# Patient Record
Sex: FEMALE | Race: WHITE | NOT HISPANIC OR LATINO
[De-identification: names, ages, dates, MRNs, and addresses within clinical notes are randomized per-mention and may not be internally consistent; named-entity substitution may affect disease eponyms.]

---

## 2018-02-28 ENCOUNTER — APPOINTMENT (OUTPATIENT)
Dept: NUCLEAR MEDICINE | Facility: IMAGING CENTER | Age: 73
End: 2018-02-28
Payer: MEDICARE

## 2018-02-28 ENCOUNTER — OUTPATIENT (OUTPATIENT)
Dept: OUTPATIENT SERVICES | Facility: HOSPITAL | Age: 73
LOS: 1 days | End: 2018-02-28
Payer: MEDICARE

## 2018-02-28 DIAGNOSIS — Z00.8 ENCOUNTER FOR OTHER GENERAL EXAMINATION: ICD-10-CM

## 2018-02-28 PROCEDURE — A9552: CPT

## 2018-02-28 PROCEDURE — 78815 PET IMAGE W/CT SKULL-THIGH: CPT | Mod: 26,PI

## 2018-02-28 PROCEDURE — 78815 PET IMAGE W/CT SKULL-THIGH: CPT

## 2018-03-26 ENCOUNTER — APPOINTMENT (OUTPATIENT)
Dept: THORACIC SURGERY | Facility: CLINIC | Age: 73
End: 2018-03-26
Payer: MEDICARE

## 2018-03-26 VITALS
RESPIRATION RATE: 17 BRPM | BODY MASS INDEX: 27.32 KG/M2 | HEIGHT: 66 IN | SYSTOLIC BLOOD PRESSURE: 118 MMHG | HEART RATE: 58 BPM | DIASTOLIC BLOOD PRESSURE: 82 MMHG | WEIGHT: 170 LBS | OXYGEN SATURATION: 98 %

## 2018-03-26 DIAGNOSIS — R91.1 SOLITARY PULMONARY NODULE: ICD-10-CM

## 2018-03-26 DIAGNOSIS — Z86.59 PERSONAL HISTORY OF OTHER MENTAL AND BEHAVIORAL DISORDERS: ICD-10-CM

## 2018-03-26 DIAGNOSIS — Z87.891 PERSONAL HISTORY OF NICOTINE DEPENDENCE: ICD-10-CM

## 2018-03-26 DIAGNOSIS — F41.9 ANXIETY DISORDER, UNSPECIFIED: ICD-10-CM

## 2018-03-26 DIAGNOSIS — Z86.79 PERSONAL HISTORY OF OTHER DISEASES OF THE CIRCULATORY SYSTEM: ICD-10-CM

## 2018-03-26 PROCEDURE — 99205 OFFICE O/P NEW HI 60 MIN: CPT

## 2018-03-26 RX ORDER — OXYCODONE HYDROCHLORIDE AND ACETAMINOPHEN 5; 325 MG/1; MG/1
5-325 TABLET ORAL
Refills: 0 | Status: ACTIVE | COMMUNITY

## 2018-03-26 RX ORDER — VENLAFAXINE HCL 75 MG
75 TABLET ORAL DAILY
Refills: 0 | Status: ACTIVE | COMMUNITY

## 2018-03-26 RX ORDER — RIVAROXABAN 20 MG/1
20 TABLET, FILM COATED ORAL
Refills: 0 | Status: ACTIVE | COMMUNITY

## 2018-04-03 ENCOUNTER — OUTPATIENT (OUTPATIENT)
Dept: OUTPATIENT SERVICES | Facility: HOSPITAL | Age: 73
LOS: 1 days | End: 2018-04-03

## 2018-04-03 ENCOUNTER — INPATIENT (INPATIENT)
Facility: HOSPITAL | Age: 73
LOS: 9 days | Discharge: HOME | End: 2018-04-13
Attending: THORACIC SURGERY (CARDIOTHORACIC VASCULAR SURGERY) | Admitting: GENERAL PRACTICE
Payer: MEDICARE

## 2018-04-03 VITALS
RESPIRATION RATE: 18 BRPM | HEART RATE: 170 BPM | OXYGEN SATURATION: 97 % | TEMPERATURE: 98 F | SYSTOLIC BLOOD PRESSURE: 127 MMHG | DIASTOLIC BLOOD PRESSURE: 81 MMHG

## 2018-04-03 VITALS
DIASTOLIC BLOOD PRESSURE: 96 MMHG | HEIGHT: 64 IN | RESPIRATION RATE: 16 BRPM | WEIGHT: 167.99 LBS | HEART RATE: 167 BPM | SYSTOLIC BLOOD PRESSURE: 132 MMHG | TEMPERATURE: 98 F

## 2018-04-03 DIAGNOSIS — R91.1 SOLITARY PULMONARY NODULE: ICD-10-CM

## 2018-04-03 DIAGNOSIS — Z98.890 OTHER SPECIFIED POSTPROCEDURAL STATES: Chronic | ICD-10-CM

## 2018-04-03 DIAGNOSIS — R00.0 TACHYCARDIA, UNSPECIFIED: ICD-10-CM

## 2018-04-03 DIAGNOSIS — F32.9 MAJOR DEPRESSIVE DISORDER, SINGLE EPISODE, UNSPECIFIED: ICD-10-CM

## 2018-04-03 DIAGNOSIS — I48.91 UNSPECIFIED ATRIAL FIBRILLATION: ICD-10-CM

## 2018-04-03 DIAGNOSIS — Z29.9 ENCOUNTER FOR PROPHYLACTIC MEASURES, UNSPECIFIED: ICD-10-CM

## 2018-04-03 DIAGNOSIS — F10.239 ALCOHOL DEPENDENCE WITH WITHDRAWAL, UNSPECIFIED: ICD-10-CM

## 2018-04-03 DIAGNOSIS — F41.9 ANXIETY DISORDER, UNSPECIFIED: ICD-10-CM

## 2018-04-03 DIAGNOSIS — I10 ESSENTIAL (PRIMARY) HYPERTENSION: ICD-10-CM

## 2018-04-03 LAB
ALBUMIN SERPL ELPH-MCNC: 4.4 G/DL — SIGNIFICANT CHANGE UP (ref 3.3–5)
ALP SERPL-CCNC: 82 U/L — SIGNIFICANT CHANGE UP (ref 40–120)
ALT FLD-CCNC: 27 U/L — SIGNIFICANT CHANGE UP (ref 4–33)
AMPHET UR-MCNC: NEGATIVE — SIGNIFICANT CHANGE UP
APAP SERPL-MCNC: < 15 UG/ML — LOW (ref 15–25)
APPEARANCE UR: SIGNIFICANT CHANGE UP
APTT BLD: 24.1 SEC — LOW (ref 27.5–37.4)
AST SERPL-CCNC: 47 U/L — HIGH (ref 4–32)
BACTERIA # UR AUTO: HIGH
BARBITURATES UR SCN-MCNC: NEGATIVE — SIGNIFICANT CHANGE UP
BASOPHILS # BLD AUTO: 0.01 K/UL — SIGNIFICANT CHANGE UP (ref 0–0.2)
BASOPHILS NFR BLD AUTO: 0.1 % — SIGNIFICANT CHANGE UP (ref 0–2)
BENZODIAZ UR-MCNC: POSITIVE — SIGNIFICANT CHANGE UP
BILIRUB SERPL-MCNC: 0.5 MG/DL — SIGNIFICANT CHANGE UP (ref 0.2–1.2)
BILIRUB UR-MCNC: NEGATIVE — SIGNIFICANT CHANGE UP
BLD GP AB SCN SERPL QL: NEGATIVE — SIGNIFICANT CHANGE UP
BLOOD UR QL VISUAL: HIGH
BUN SERPL-MCNC: 10 MG/DL — SIGNIFICANT CHANGE UP (ref 7–23)
BUN SERPL-MCNC: 11 MG/DL — SIGNIFICANT CHANGE UP (ref 7–23)
BUN SERPL-MCNC: 9 MG/DL — SIGNIFICANT CHANGE UP (ref 7–23)
CALCIUM SERPL-MCNC: 8.1 MG/DL — LOW (ref 8.4–10.5)
CALCIUM SERPL-MCNC: 8.7 MG/DL — SIGNIFICANT CHANGE UP (ref 8.4–10.5)
CALCIUM SERPL-MCNC: 8.7 MG/DL — SIGNIFICANT CHANGE UP (ref 8.4–10.5)
CANNABINOIDS UR-MCNC: NEGATIVE — SIGNIFICANT CHANGE UP
CHLORIDE SERPL-SCNC: 100 MMOL/L — SIGNIFICANT CHANGE UP (ref 98–107)
CHLORIDE SERPL-SCNC: 101 MMOL/L — SIGNIFICANT CHANGE UP (ref 98–107)
CHLORIDE SERPL-SCNC: 105 MMOL/L — SIGNIFICANT CHANGE UP (ref 98–107)
CHOLEST SERPL-MCNC: 218 MG/DL — HIGH (ref 120–199)
CK MB BLD-MCNC: 2.32 NG/ML — SIGNIFICANT CHANGE UP (ref 1–4.7)
CK MB BLD-MCNC: SIGNIFICANT CHANGE UP (ref 0–2.5)
CK SERPL-CCNC: 61 U/L — SIGNIFICANT CHANGE UP (ref 25–170)
CO2 SERPL-SCNC: 24 MMOL/L — SIGNIFICANT CHANGE UP (ref 22–31)
CO2 SERPL-SCNC: 26 MMOL/L — SIGNIFICANT CHANGE UP (ref 22–31)
CO2 SERPL-SCNC: 29 MMOL/L — SIGNIFICANT CHANGE UP (ref 22–31)
COCAINE METAB.OTHER UR-MCNC: NEGATIVE — SIGNIFICANT CHANGE UP
COLOR SPEC: YELLOW — SIGNIFICANT CHANGE UP
CREAT SERPL-MCNC: 0.49 MG/DL — LOW (ref 0.5–1.3)
CREAT SERPL-MCNC: 0.56 MG/DL — SIGNIFICANT CHANGE UP (ref 0.5–1.3)
CREAT SERPL-MCNC: 0.66 MG/DL — SIGNIFICANT CHANGE UP (ref 0.5–1.3)
EOSINOPHIL # BLD AUTO: 0.09 K/UL — SIGNIFICANT CHANGE UP (ref 0–0.5)
EOSINOPHIL NFR BLD AUTO: 1.1 % — SIGNIFICANT CHANGE UP (ref 0–6)
ETHANOL BLD-MCNC: < 10 MG/DL — SIGNIFICANT CHANGE UP
GLUCOSE SERPL-MCNC: 105 MG/DL — HIGH (ref 70–99)
GLUCOSE SERPL-MCNC: 87 MG/DL — SIGNIFICANT CHANGE UP (ref 70–99)
GLUCOSE SERPL-MCNC: 95 MG/DL — SIGNIFICANT CHANGE UP (ref 70–99)
GLUCOSE UR-MCNC: NEGATIVE — SIGNIFICANT CHANGE UP
HCT VFR BLD CALC: 39.6 % — SIGNIFICANT CHANGE UP (ref 34.5–45)
HCT VFR BLD CALC: 41 % — SIGNIFICANT CHANGE UP (ref 34.5–45)
HCT VFR BLD CALC: 41.5 % — SIGNIFICANT CHANGE UP (ref 34.5–45)
HDLC SERPL-MCNC: 91 MG/DL — HIGH (ref 45–65)
HGB BLD-MCNC: 13.1 G/DL — SIGNIFICANT CHANGE UP (ref 11.5–15.5)
HGB BLD-MCNC: 13.7 G/DL — SIGNIFICANT CHANGE UP (ref 11.5–15.5)
HGB BLD-MCNC: 13.9 G/DL — SIGNIFICANT CHANGE UP (ref 11.5–15.5)
IMM GRANULOCYTES # BLD AUTO: 0.02 # — SIGNIFICANT CHANGE UP
IMM GRANULOCYTES NFR BLD AUTO: 0.3 % — SIGNIFICANT CHANGE UP (ref 0–1.5)
INR BLD: 1.18 — HIGH (ref 0.88–1.17)
KETONES UR-MCNC: NEGATIVE — SIGNIFICANT CHANGE UP
LEUKOCYTE ESTERASE UR-ACNC: HIGH
LIPID PNL WITH DIRECT LDL SERPL: 128 MG/DL — SIGNIFICANT CHANGE UP
LYMPHOCYTES # BLD AUTO: 2.33 K/UL — SIGNIFICANT CHANGE UP (ref 1–3.3)
LYMPHOCYTES # BLD AUTO: 29.2 % — SIGNIFICANT CHANGE UP (ref 13–44)
MCHC RBC-ENTMCNC: 33.1 % — SIGNIFICANT CHANGE UP (ref 32–36)
MCHC RBC-ENTMCNC: 33.2 PG — SIGNIFICANT CHANGE UP (ref 27–34)
MCHC RBC-ENTMCNC: 33.4 % — SIGNIFICANT CHANGE UP (ref 32–36)
MCHC RBC-ENTMCNC: 33.5 % — SIGNIFICANT CHANGE UP (ref 32–36)
MCHC RBC-ENTMCNC: 33.5 PG — SIGNIFICANT CHANGE UP (ref 27–34)
MCHC RBC-ENTMCNC: 33.7 PG — SIGNIFICANT CHANGE UP (ref 27–34)
MCV RBC AUTO: 100 FL — SIGNIFICANT CHANGE UP (ref 80–100)
MCV RBC AUTO: 100.3 FL — HIGH (ref 80–100)
MCV RBC AUTO: 101 FL — HIGH (ref 80–100)
METHADONE UR-MCNC: NEGATIVE — SIGNIFICANT CHANGE UP
MONOCYTES # BLD AUTO: 0.92 K/UL — HIGH (ref 0–0.9)
MONOCYTES NFR BLD AUTO: 11.5 % — SIGNIFICANT CHANGE UP (ref 2–14)
MUCOUS THREADS # UR AUTO: SIGNIFICANT CHANGE UP
NEUTROPHILS # BLD AUTO: 4.6 K/UL — SIGNIFICANT CHANGE UP (ref 1.8–7.4)
NEUTROPHILS NFR BLD AUTO: 57.8 % — SIGNIFICANT CHANGE UP (ref 43–77)
NITRITE UR-MCNC: NEGATIVE — SIGNIFICANT CHANGE UP
NON-SQ EPI CELLS # UR AUTO: <1 — SIGNIFICANT CHANGE UP
NRBC # FLD: 0 — SIGNIFICANT CHANGE UP
OPIATES UR-MCNC: NEGATIVE — SIGNIFICANT CHANGE UP
OXYCODONE UR-MCNC: POSITIVE — HIGH
PCP UR-MCNC: NEGATIVE — SIGNIFICANT CHANGE UP
PH UR: 6.5 — SIGNIFICANT CHANGE UP (ref 5–8)
PLATELET # BLD AUTO: 181 K/UL — SIGNIFICANT CHANGE UP (ref 150–400)
PLATELET # BLD AUTO: 201 K/UL — SIGNIFICANT CHANGE UP (ref 150–400)
PLATELET # BLD AUTO: 207 K/UL — SIGNIFICANT CHANGE UP (ref 150–400)
PMV BLD: 10.4 FL — SIGNIFICANT CHANGE UP (ref 7–13)
PMV BLD: 10.6 FL — SIGNIFICANT CHANGE UP (ref 7–13)
PMV BLD: 10.6 FL — SIGNIFICANT CHANGE UP (ref 7–13)
POTASSIUM SERPL-MCNC: 3.4 MMOL/L — LOW (ref 3.5–5.3)
POTASSIUM SERPL-MCNC: 3.9 MMOL/L — SIGNIFICANT CHANGE UP (ref 3.5–5.3)
POTASSIUM SERPL-MCNC: 5.2 MMOL/L — SIGNIFICANT CHANGE UP (ref 3.5–5.3)
POTASSIUM SERPL-SCNC: 3.4 MMOL/L — LOW (ref 3.5–5.3)
POTASSIUM SERPL-SCNC: 3.9 MMOL/L — SIGNIFICANT CHANGE UP (ref 3.5–5.3)
POTASSIUM SERPL-SCNC: 5.2 MMOL/L — SIGNIFICANT CHANGE UP (ref 3.5–5.3)
PROT SERPL-MCNC: 7.5 G/DL — SIGNIFICANT CHANGE UP (ref 6–8.3)
PROT UR-MCNC: 30 MG/DL — HIGH
PROTHROM AB SERPL-ACNC: 13.1 SEC — SIGNIFICANT CHANGE UP (ref 9.8–13.1)
RBC # BLD: 3.95 M/UL — SIGNIFICANT CHANGE UP (ref 3.8–5.2)
RBC # BLD: 4.06 M/UL — SIGNIFICANT CHANGE UP (ref 3.8–5.2)
RBC # BLD: 4.15 M/UL — SIGNIFICANT CHANGE UP (ref 3.8–5.2)
RBC # FLD: 13.9 % — SIGNIFICANT CHANGE UP (ref 10.3–14.5)
RBC # FLD: 14 % — SIGNIFICANT CHANGE UP (ref 10.3–14.5)
RBC # FLD: 14.1 % — SIGNIFICANT CHANGE UP (ref 10.3–14.5)
RBC CASTS # UR COMP ASSIST: HIGH (ref 0–?)
RH IG SCN BLD-IMP: POSITIVE — SIGNIFICANT CHANGE UP
SALICYLATES SERPL-MCNC: < 5 MG/DL — LOW (ref 15–30)
SODIUM SERPL-SCNC: 140 MMOL/L — SIGNIFICANT CHANGE UP (ref 135–145)
SODIUM SERPL-SCNC: 142 MMOL/L — SIGNIFICANT CHANGE UP (ref 135–145)
SODIUM SERPL-SCNC: 143 MMOL/L — SIGNIFICANT CHANGE UP (ref 135–145)
SP GR SPEC: 1.02 — SIGNIFICANT CHANGE UP (ref 1–1.04)
SQUAMOUS # UR AUTO: SIGNIFICANT CHANGE UP
TRIGL SERPL-MCNC: 74 MG/DL — SIGNIFICANT CHANGE UP (ref 10–149)
TROPONIN T SERPL-MCNC: < 0.06 NG/ML — SIGNIFICANT CHANGE UP (ref 0–0.06)
TROPONIN T SERPL-MCNC: < 0.06 NG/ML — SIGNIFICANT CHANGE UP (ref 0–0.06)
TSH SERPL-MCNC: 2.83 UIU/ML — SIGNIFICANT CHANGE UP (ref 0.27–4.2)
UROBILINOGEN FLD QL: 0.2 MG/DL — SIGNIFICANT CHANGE UP
WBC # BLD: 6.8 K/UL — SIGNIFICANT CHANGE UP (ref 3.8–10.5)
WBC # BLD: 7.4 K/UL — SIGNIFICANT CHANGE UP (ref 3.8–10.5)
WBC # BLD: 7.97 K/UL — SIGNIFICANT CHANGE UP (ref 3.8–10.5)
WBC # FLD AUTO: 6.8 K/UL — SIGNIFICANT CHANGE UP (ref 3.8–10.5)
WBC # FLD AUTO: 7.4 K/UL — SIGNIFICANT CHANGE UP (ref 3.8–10.5)
WBC # FLD AUTO: 7.97 K/UL — SIGNIFICANT CHANGE UP (ref 3.8–10.5)
WBC UR QL: >50 — HIGH (ref 0–?)

## 2018-04-03 PROCEDURE — 99222 1ST HOSP IP/OBS MODERATE 55: CPT

## 2018-04-03 PROCEDURE — 93010 ELECTROCARDIOGRAM REPORT: CPT | Mod: 76

## 2018-04-03 RX ORDER — DILTIAZEM HCL 120 MG
15 CAPSULE, EXT RELEASE 24 HR ORAL
Qty: 125 | Refills: 0 | Status: DISCONTINUED | OUTPATIENT
Start: 2018-04-03 | End: 2018-04-06

## 2018-04-03 RX ORDER — PANTOPRAZOLE SODIUM 20 MG/1
40 TABLET, DELAYED RELEASE ORAL
Qty: 0 | Refills: 0 | Status: DISCONTINUED | OUTPATIENT
Start: 2018-04-03 | End: 2018-04-06

## 2018-04-03 RX ORDER — SODIUM CHLORIDE 9 MG/ML
250 INJECTION INTRAMUSCULAR; INTRAVENOUS; SUBCUTANEOUS ONCE
Qty: 0 | Refills: 0 | Status: COMPLETED | OUTPATIENT
Start: 2018-04-03 | End: 2018-04-03

## 2018-04-03 RX ORDER — ADENOSINE 3 MG/ML
12 INJECTION INTRAVENOUS ONCE
Qty: 0 | Refills: 0 | Status: COMPLETED | OUTPATIENT
Start: 2018-04-03 | End: 2018-04-03

## 2018-04-03 RX ORDER — ADENOSINE 3 MG/ML
6 INJECTION INTRAVENOUS ONCE
Qty: 0 | Refills: 0 | Status: COMPLETED | OUTPATIENT
Start: 2018-04-03 | End: 2018-04-03

## 2018-04-03 RX ORDER — ENOXAPARIN SODIUM 100 MG/ML
80 INJECTION SUBCUTANEOUS EVERY 12 HOURS
Qty: 0 | Refills: 0 | Status: DISCONTINUED | OUTPATIENT
Start: 2018-04-03 | End: 2018-04-05

## 2018-04-03 RX ORDER — DRONEDARONE 400 MG/1
400 TABLET, FILM COATED ORAL
Qty: 0 | Refills: 0 | Status: DISCONTINUED | OUTPATIENT
Start: 2018-04-03 | End: 2018-04-03

## 2018-04-03 RX ORDER — ALPRAZOLAM 0.25 MG
1 TABLET ORAL EVERY 8 HOURS
Qty: 0 | Refills: 0 | Status: DISCONTINUED | OUTPATIENT
Start: 2018-04-03 | End: 2018-04-06

## 2018-04-03 RX ORDER — OXYCODONE AND ACETAMINOPHEN 5; 325 MG/1; MG/1
1 TABLET ORAL EVERY 6 HOURS
Qty: 0 | Refills: 0 | Status: DISCONTINUED | OUTPATIENT
Start: 2018-04-03 | End: 2018-04-06

## 2018-04-03 RX ORDER — SODIUM CHLORIDE 9 MG/ML
1000 INJECTION INTRAMUSCULAR; INTRAVENOUS; SUBCUTANEOUS ONCE
Qty: 0 | Refills: 0 | Status: COMPLETED | OUTPATIENT
Start: 2018-04-03 | End: 2018-04-03

## 2018-04-03 RX ORDER — FOLIC ACID 0.8 MG
1 TABLET ORAL DAILY
Qty: 0 | Refills: 0 | Status: DISCONTINUED | OUTPATIENT
Start: 2018-04-03 | End: 2018-04-06

## 2018-04-03 RX ORDER — THIAMINE MONONITRATE (VIT B1) 100 MG
100 TABLET ORAL DAILY
Qty: 0 | Refills: 0 | Status: DISCONTINUED | OUTPATIENT
Start: 2018-04-03 | End: 2018-04-06

## 2018-04-03 RX ORDER — LOSARTAN POTASSIUM 100 MG/1
100 TABLET, FILM COATED ORAL DAILY
Qty: 0 | Refills: 0 | Status: DISCONTINUED | OUTPATIENT
Start: 2018-04-04 | End: 2018-04-04

## 2018-04-03 RX ORDER — VENLAFAXINE HCL 75 MG
150 CAPSULE, EXT RELEASE 24 HR ORAL DAILY
Qty: 0 | Refills: 0 | Status: DISCONTINUED | OUTPATIENT
Start: 2018-04-03 | End: 2018-04-06

## 2018-04-03 RX ADMIN — Medication 100 MILLIGRAM(S): at 15:04

## 2018-04-03 RX ADMIN — SODIUM CHLORIDE 1000 MILLILITER(S): 9 INJECTION INTRAMUSCULAR; INTRAVENOUS; SUBCUTANEOUS at 14:52

## 2018-04-03 RX ADMIN — ADENOSINE 12 MILLIGRAM(S): 3 INJECTION INTRAVENOUS at 12:15

## 2018-04-03 RX ADMIN — ENOXAPARIN SODIUM 80 MILLIGRAM(S): 100 INJECTION SUBCUTANEOUS at 15:02

## 2018-04-03 RX ADMIN — Medication 10 MG/HR: at 15:19

## 2018-04-03 RX ADMIN — OXYCODONE AND ACETAMINOPHEN 1 TABLET(S): 5; 325 TABLET ORAL at 23:31

## 2018-04-03 RX ADMIN — DRONEDARONE 400 MILLIGRAM(S): 400 TABLET, FILM COATED ORAL at 18:27

## 2018-04-03 RX ADMIN — Medication 1 MILLIGRAM(S): at 15:23

## 2018-04-03 RX ADMIN — Medication 1 MILLIGRAM(S): at 23:31

## 2018-04-03 RX ADMIN — Medication 1 TABLET(S): at 15:03

## 2018-04-03 RX ADMIN — Medication 1 MILLIGRAM(S): at 15:03

## 2018-04-03 RX ADMIN — ADENOSINE 6 MILLIGRAM(S): 3 INJECTION INTRAVENOUS at 11:42

## 2018-04-03 RX ADMIN — SODIUM CHLORIDE 500 MILLILITER(S): 9 INJECTION INTRAMUSCULAR; INTRAVENOUS; SUBCUTANEOUS at 12:50

## 2018-04-03 NOTE — ED ADULT NURSE REASSESSMENT NOTE - NS ED NURSE REASSESS COMMENT FT1
Pt is resting well states she feels slighly anxious no c/o of chest pain, sob, pt in Afib she was medicated as ordered for increased heart rate Pt was placed on a Cardizem drip as ordered via an alaris pump and 20  gague in the right hand.  Pt ciwa score is a 5 she was medicated as ordered for increase anxiousness. Her respirations are even unlabored lungs clear pt is tolerating po intact well.

## 2018-04-03 NOTE — CONSULT NOTE ADULT - SUBJECTIVE AND OBJECTIVE BOX
Patient is a 73y old  Female who presents with a chief complaint of         HPI:  73 year old female with PMH of HTN, anxiety, depression, HLD, failed AFib with ablation x2 (4 years ago &  Nov 2017 at [facility])-on Xarelto, multiple cardioversion, recently diagnosed with L lung nodule pre-op L VATS this Friday.  Patient was seen at pre-surgical testing today and was noted in SVT at 167 bpm.  EKG and telemetry demonstrated persistent SVT -non responding to Adenosine 6 mg then 12 mg.  SVT slowed down after IV Diltiazem 20 mg transiently.  Patient reports mild palpitations in SVT but denies CP/SOB or lightheadedness.  She was prescribed Multaq last December for recurrent AF after 2rd ablation.   An echocardiogram and a stress test done in 2017 were normal findings per patient.         PAST MEDICAL & SURGICAL HISTORY:  Hypertension  Dyslipidemia  Depression  Atrial fibrillation  Arthritis  Anxiety  History of cardioversion: 12/2017  H/O prior ablation treatment: cardiac 11/2017      MEDICATIONS  (STANDING):  Multaq; Xarelto (stopped on 4/2);     MEDICATIONS  (PRN):    Allergies    No Known Allergies    Intolerances      FAMILY HISTORY:  Family history of breast cancer (Mother)      SOCIAL HISTORY:  Denies smoking; +3-4 glasses wine daily; denies drug abuse     REVIEW OF SYSTEMS:    CONSTITUTIONAL: No fever, weight loss, chills, shakes, or fatigue  EYES: No eye pain, visual disturbances, or discharge  ENMT:  No difficulty hearing, tinnitus, vertigo; No sinus or throat pain  NECK: No pain or stiffness  RESPIRATORY: No cough, wheezing, hemoptysis, or shortness of breath  CARDIOVASCULAR: No chest pain, dyspnea,  dizziness, syncope, paroxysmal nocturnal dyspnea, orthopnea, or arm or leg swelling + palpitations,  GASTROINTESTINAL: No abdominal  or epigastric pain, nausea, vomiting, hematemesis, diarrhea, constipation, melena or bright red blood.  GENITOURINARY: No dysuria, nocturia, hematuria, or urinary incontinence  NEUROLOGICAL: No headaches, memory loss, slurred speech, limb weakness, loss of strength, numbness, or tremors  SKIN: No itching, burning, rashes, or lesions   LYMPH NODES: No enlarged glands  ENDOCRINE: No heat or cold intolerance, or hair loss  MUSCULOSKELETAL: No joint pain or swelling, muscle, back, or extremity pain  PSYCHIATRIC: + depression, anxiety,   HEME/LYMPH: No easy bruising or bleeding gums  ALLERY AND IMMUNOLOGIC: No hives or rash.      Vital Signs Last 24 Hrs  T(C): 37 (03 Apr 2018 11:22), Max: 37 (03 Apr 2018 11:22)  T(F): 98.6 (03 Apr 2018 11:22), Max: 98.6 (03 Apr 2018 11:22)  HR: 120 (03 Apr 2018 13:49) (112 - 170)  BP: 109/75 (03 Apr 2018 13:49) (101/60 - 148/105)  BP(mean): 108 (03 Apr 2018 09:15) (108 - 108)  RR: 18 (03 Apr 2018 13:49) (14 - 18)  SpO2: 100% (03 Apr 2018 13:49) (97% - 100%)    PHYSICAL EXAM:    GENERAL: In no apparent distress, well nourished, and hydrated.  HEAD:  Atraumatic, Normocephalic  NECK: Supple . No JVD or carotid bruit or thyroidmegaly.  Carotid pulse is 2+ bilaterally.  PULMONARY: Clear to auscultation and perfusion.  No rales, wheezing, or rhonchi bilaterally.  HEART: Regular rate and rhythm; No murmurs, rubs, or gallops.  ABDOMEN: Soft, Nontender, Nondistended; Bowel sounds present  EXTREMITIES: No clubbing, cyanosis, or edema  NEUROLOGICAL: Alert oriented to person, place and time.  Speech clear.  Skin: Dry intact, no rashes or lesions.          INTERPRETATION OF TELEMETRY:  AFib/AT with RVR up to 167; HR down to 110-120's after Diltiazem    ECG:  bpm  (AT/AFib with RVR)        LABS:                        13.9   7.97  )-----------( 201      ( 03 Apr 2018 11:20 )             41.5     04-03    140  |  100  |  10  ----------------------------<  87  5.2   |  26  |  0.56    Ca    8.7      03 Apr 2018 11:20    TPro  7.5  /  Alb  4.4  /  TBili  0.5  /  DBili  x   /  AST  47<H>  /  ALT  27  /  AlkPhos  82  04-03    CARDIAC MARKERS ( 03 Apr 2018 11:20 )  x     / < 0.06 ng/mL / x     / x     / x          PT/INR - ( 03 Apr 2018 11:20 )   PT: 13.1 SEC;   INR: 1.18          PTT - ( 03 Apr 2018 11:20 )  PTT:24.1 SEC      BNP  RADIOLOGY & ADDITIONAL STUDIES:  PREVIOUS DIAGNOSTIC TESTING:      ECHO FINDINGS:    STRESS FINDINGS:    CATHETERIZATION FINDINGS: Patient is a 73y old  Female who presents with a chief complaint of         HPI:  73 year old female with PMH of HTN, anxiety, depression, HLD, failed AFib with ablation x2 (4 years ago &  Nov 2017 at [facility])-on Xarelto, multiple cardioversion, recently diagnosed with L lung nodule pre-op L VATS this Friday.  Patient was seen at pre-surgical testing today and was noted in SVT at 167 bpm.  EKG and telemetry demonstrated persistent SVT -non responding to Adenosine 6 mg then 12 mg.  SVT slowed down after IV Diltiazem 20 mg transiently.  Patient reports mild palpitations in SVT but denies CP/SOB or lightheadedness.  She was prescribed Multaq last December for recurrent AF after 2rd ablation.   An echocardiogram and a stress test done in 2017 were normal findings per patient.         PAST MEDICAL & SURGICAL HISTORY:  Hypertension  Dyslipidemia  Depression  Atrial fibrillation  Arthritis  Anxiety  History of cardioversion: 12/2017  H/O prior ablation treatment: cardiac 11/2017      MEDICATIONS  (STANDING):  Multaq 400 mg BID; Xarelto 20 mg daily(stopped on 4/2); Effexor 150 mg daily; Losatan-HCTZ 100 mg /25 mg daily; Alprazolan; Protonix; Percocet prn    MEDICATIONS  (PRN):    Allergies    No Known Allergies    Intolerances      FAMILY HISTORY:  Family history of breast cancer (Mother)      SOCIAL HISTORY:  Denies smoking; +3-4 glasses wine daily; denies drug abuse     REVIEW OF SYSTEMS:    CONSTITUTIONAL: No fever, weight loss, chills, shakes, or fatigue  EYES: No eye pain, visual disturbances, or discharge  ENMT:  No difficulty hearing, tinnitus, vertigo; No sinus or throat pain  NECK: No pain or stiffness  RESPIRATORY: No cough, wheezing, hemoptysis, or shortness of breath  CARDIOVASCULAR: No chest pain, dyspnea,  dizziness, syncope, paroxysmal nocturnal dyspnea, orthopnea, or arm or leg swelling + palpitations,  GASTROINTESTINAL: No abdominal  or epigastric pain, nausea, vomiting, hematemesis, diarrhea, constipation, melena or bright red blood.  GENITOURINARY: No dysuria, nocturia, hematuria, or urinary incontinence  NEUROLOGICAL: No headaches, memory loss, slurred speech, limb weakness, loss of strength, numbness, or tremors  SKIN: No itching, burning, rashes, or lesions   LYMPH NODES: No enlarged glands  ENDOCRINE: No heat or cold intolerance, or hair loss  MUSCULOSKELETAL: No joint pain or swelling, muscle, back, or extremity pain  PSYCHIATRIC: + depression, anxiety,   HEME/LYMPH: No easy bruising or bleeding gums  ALLERY AND IMMUNOLOGIC: No hives or rash.      Vital Signs Last 24 Hrs  T(C): 37 (03 Apr 2018 11:22), Max: 37 (03 Apr 2018 11:22)  T(F): 98.6 (03 Apr 2018 11:22), Max: 98.6 (03 Apr 2018 11:22)  HR: 120 (03 Apr 2018 13:49) (112 - 170)  BP: 109/75 (03 Apr 2018 13:49) (101/60 - 148/105)  BP(mean): 108 (03 Apr 2018 09:15) (108 - 108)  RR: 18 (03 Apr 2018 13:49) (14 - 18)  SpO2: 100% (03 Apr 2018 13:49) (97% - 100%)    PHYSICAL EXAM:    GENERAL: In no apparent distress, well nourished, and hydrated.  HEAD:  Atraumatic, Normocephalic  NECK: Supple . No JVD or carotid bruit or thyroidmegaly.  Carotid pulse is 2+ bilaterally.  PULMONARY: Clear to auscultation and perfusion.  No rales, wheezing, or rhonchi bilaterally.  HEART: Regular rate and rhythm; No murmurs, rubs, or gallops.  ABDOMEN: Soft, Nontender, Nondistended; Bowel sounds present  EXTREMITIES: No clubbing, cyanosis, or edema  NEUROLOGICAL: Alert oriented to person, place and time.  Speech clear.  Skin: Dry intact, no rashes or lesions.          INTERPRETATION OF TELEMETRY:  AFib/AT with RVR up to 167; HR down to 110-120's after Diltiazem    ECG:  bpm  (AT/AFib with RVR)        LABS:                        13.9   7.97  )-----------( 201      ( 03 Apr 2018 11:20 )             41.5     04-03    140  |  100  |  10  ----------------------------<  87  5.2   |  26  |  0.56    Ca    8.7      03 Apr 2018 11:20    TPro  7.5  /  Alb  4.4  /  TBili  0.5  /  DBili  x   /  AST  47<H>  /  ALT  27  /  AlkPhos  82  04-03    CARDIAC MARKERS ( 03 Apr 2018 11:20 )  x     / < 0.06 ng/mL / x     / x     / x          PT/INR - ( 03 Apr 2018 11:20 )   PT: 13.1 SEC;   INR: 1.18          PTT - ( 03 Apr 2018 11:20 )  PTT:24.1 SEC      BNP  RADIOLOGY & ADDITIONAL STUDIES:  PREVIOUS DIAGNOSTIC TESTING:      ECHO FINDINGS:    STRESS FINDINGS:    CATHETERIZATION FINDINGS:

## 2018-04-03 NOTE — H&P ADULT - NSHPSOCIALHISTORY_GEN_ALL_CORE
Pt former smoker 39 years ago, about 1 ppd x15yrs, denies drugs.  Alcohol Abuse 3-4 glasses/wine daily, last time drank 1 bottle of wine. Feels guilty about drinking and admits to alcohol problem.

## 2018-04-03 NOTE — ED PROVIDER NOTE - ATTENDING CONTRIBUTION TO CARE
I performed a face to face evaluation of this patient and performed a full history and physical examination on the patient.  I agree with the resident's history, physical examination, and plan of the patient.  Pt sent from Dr. Arroyo's office getting scheduled for lung nodule excision found to be in tachyarrhythmia, no cp, dizziness, fever or cough but felt palpitations.  Well appearing s1,s2 irregularly irregular, lungs cta, abd soft neuro wnl, for rate control, will call pmd, monitor and admit

## 2018-04-03 NOTE — H&P PST ADULT - PROBLEM SELECTOR PLAN 3
pt instructed to take multaq on morning of surgery.  pt HR=160's during this PST visit pt instructed to take multaq on morning of surgery.  pt HR=160's during this PST visit  copy of EKG fax to Dr Lutz (cardiologist) office, Spoke with Sidney MACIEL pt instructed to take multaq on morning of surgery.  pt HR=160's during this PST visit.  Pt denies symptomatology.  copy of EKG fax to Dr Lutz (cardiologist) office, Spoke with Sidney MACIEL copy of EKG faxed to office.  Covering cardiologist Dr Roth recommended pt to be transport to ER.  Pt transported by EMS to [facility] ER   pending copy of cardiology eval & most recent cardiology studies

## 2018-04-03 NOTE — ED PROVIDER NOTE - OBJECTIVE STATEMENT
74 yo F PMHx Afib with multiple ablations in the past at [facility] (4 years ago and in Nov 2017) and cardioversion (Dec 2017) presents from pre-surgical testing with tachyarrhythmia. Pt was at presurgical testing for a long nodule that will be removed on Friday. EKG showed tachycardia and she was transferred by EMS to ER. She had some palpitations but no other symptoms. She denies CP, SOB, abd pain, N/V, fevers/chills, lightheadedness/dizziness. She is an ex smoker, drinks about 3-4 glasses of wine with dinner nightly, and denies drug use. Her PCP/cardiologist is Dr. Ezio Lutz at [facility].

## 2018-04-03 NOTE — SBIRT NOTE. - NSSBIRTSERVICES_GEN_A_ED_FT
Provided SBIRT services: Full screen Negative. Positive reinforcement provided given patient currently within healthy guidelines.   Audit Score: 5  DAST Score: 0  Duration = # 7 Minutes

## 2018-04-03 NOTE — H&P ADULT - PROBLEM SELECTOR PLAN 1
tele monitor   cardiac enzymes x 2  Serial EKGs  Therapeutic Lovenox 80mg SC Q12h for now  Maintain falls and bleeding precautions   House EP consulted  Cardizem 60mg PO Q6h  TTE  IV Fluids hydration

## 2018-04-03 NOTE — H&P ADULT - ATTENDING COMMENTS
Patient seen, examined, and interviewed by me, case discussed with me, chart reviewed, agree with above H/P as reviewed and edited by me.  See  full note above.  discussed with cardio as well  EP huy VENTURA

## 2018-04-03 NOTE — ED ADULT NURSE REASSESSMENT NOTE - NS ED NURSE REASSESS COMMENT FT1
Pt was given adenosine 6mg  ivp by DR. Borden  at 11:42am with no change in rhythm pt was given a second dose of adenosine 12mg ivp with a change for a few seconds then back in SVT.  Pt is alert and oriented no c/o of chest pain or feeling sob pt was given Cardizem then she converted to a AFIB at a rate of 100.

## 2018-04-03 NOTE — ED ADULT NURSE NOTE - OBJECTIVE STATEMENT
Pt received to room 4 in SVT pt is awake and oriented x 4 she denies chest pain or feeling sob no c/o of being lightheaded and iv was accessed labs sent pt was placed on 2 liters of oxygen v/s taken her respirations are even unlabored lungs clear o2sat 100. Stat EKG was done Md is here to evaluate the PT.

## 2018-04-03 NOTE — ED PROVIDER NOTE - PHYSICAL EXAMINATION
Gen: AAOx3, non-toxic  Head: NCAT  HEENT: EOMI, oral mucosa moist, normal conjunctiva  Lung: CTAB, no respiratory distress, no wheezes/rhonchi/rales B/L, speaking in full sentences  CV: tachycardic, +2 radial pulses bilaterally  Abd: soft, NTND, no guarding  MSK: no visible deformities  Neuro: No focal sensory or motor deficits  Skin: Warm, well perfused, no rash  Psych: normal affect.   ~Leonardo Durand M.D. Resident Gen: AAOx3, non-toxic  Head: NCAT  HEENT: EOMI, oral mucosa moist, normal conjunctiva  Lung: CTAB, no respiratory distress, no wheezes/rhonchi/rales B/L, speaking in full sentences  CV: tachycardic, +2 radial pulses bilaterally s1, s2, irregularly irregular  Abd: soft, NTND, no guarding  MSK: no visible deformities  Neuro: No focal sensory or motor deficits  Skin: Warm, well perfused, no rash  Psych: normal affect.   ~Leonardo Durand M.D. Resident

## 2018-04-03 NOTE — CONSULT NOTE ADULT - ASSESSMENT
afib with RVR  start cardizem infusion   a/c   lovenox for now  echo  tsh  fu with EP    etoh withdrawal  ciwa protocol    lung mass  consult cts dr blancas to see if they want to intervene this admission

## 2018-04-03 NOTE — ED ADULT TRIAGE NOTE - CHIEF COMPLAINT QUOTE
Sent from pre surgical testing for elevated HR and arrythmia.  Was scheduled for resection of lung for Friday.  Asymptomatic

## 2018-04-03 NOTE — H&P PST ADULT - LYMPHATIC
posterior cervical L/anterior cervical L/supraclavicular L/posterior cervical R/supraclavicular R/anterior cervical R

## 2018-04-03 NOTE — H&P ADULT - PMH
Alcohol abuse    Anxiety    Arthritis    Atrial fibrillation    Depression    Dyslipidemia    Hypertension

## 2018-04-03 NOTE — H&P PST ADULT - NEGATIVE CARDIOVASCULAR SYMPTOMS
no dyspnea on exertion/no orthopnea/no paroxysmal nocturnal dyspnea/no chest pain/no peripheral edema/no claudication/no palpitations

## 2018-04-03 NOTE — ED ADULT NURSE NOTE - CHPI ED SYMPTOMS NEG
no fever/no vomiting/no back pain/no chest pain/no cough/no shortness of breath/no dizziness/no nausea/no diaphoresis/no chills/no syncope

## 2018-04-03 NOTE — H&P PST ADULT - HISTORY OF PRESENT ILLNESS
72y/o female presents for preop eval for scheduled left VAT, left upper lobe wedge resection 2018.  Pt states incidental finding of left upper lung nodule on ct scan during cardiology work up for afib.

## 2018-04-03 NOTE — H&P ADULT - ASSESSMENT
72 yo F PMHx Afib with multiple ablations in the past at [facility] (4 years ago and in Nov 2017) and cardioversion (Dec 2017) sent from pre-surgical testing for palpitations, found to be in Rapid Afib @ 167bpm, admitted to tele for Rapid Afib, r/o ACS. 72 yo F PMHx Afib with multiple ablations in the past at [facility] (4 years ago and in Nov 2017) and cardioversion (Dec 2017) sent from pre-surgical testing for palpitations, found to be in Rapid Afib @ 167bpm, admitted to tele for Rapid Afib, r/o ACS and Alcohol Withdrawal.    +Rapid Afib-->Rusty x2, Cardizem 60mg Q6h, TTE, IV Fluids hydration  +Alcohol Withdrawal-->CIWA protocol initiated, Folic Acid, Thiamine, MVI.

## 2018-04-03 NOTE — ED ADULT NURSE REASSESSMENT NOTE - NS ED NURSE REASSESS COMMENT FT1
Pt is resting well she denies any complaints of chest pain or feeling sob no c/o of being lightheaded pt heart rate is elevated back to 130's Afib DR. Sales is aware and the pt was medicated as ordered and given a 250cc bolus of normal saline.

## 2018-04-03 NOTE — H&P ADULT - NEGATIVE CARDIOVASCULAR SYMPTOMS
no dyspnea on exertion/no claudication/no peripheral edema/no orthopnea/no paroxysmal nocturnal dyspnea/no chest pain

## 2018-04-03 NOTE — H&P PST ADULT - VISION (WITH CORRECTIVE LENSES IF THE PATIENT USUALLY WEARS THEM):
Partially impaired: cannot see medication labels or newsprint, but can see obstacles in path, and the surrounding layout; can count fingers at arm's length/wears lenses

## 2018-04-03 NOTE — H&P ADULT - NSHPLABSRESULTS_GEN_ALL_CORE
EKG: Afib w/ RVR @ 167 bpm  Rusty x1: neg  2/28/18 PET Scan: 1.  Solid and groundglass mildly FDG avid left upper lobe nodule, is   suspicious for malignancy. No mediastinal lymphadenopathy or distant   metastatic disease.  2.  FDG avidity in the oral cavity is probably physiologic salivary   activity. Correlate with oral exam.  3.  FDG avidity in a short segment of the distal ileum/proximal cecum,   without CT correlate, probably physiologic. Suggest correlation with   colonoscopy results.

## 2018-04-03 NOTE — H&P PST ADULT - PROBLEM SELECTOR PLAN 1
scheduled left VAT, left upper lobe wedge resection 2018.   preop instructions, gi prophylaxis & surgical soap given  pt verbalized understanding   abo on admit

## 2018-04-03 NOTE — ED PROVIDER NOTE - PROGRESS NOTE DETAILS
spoke with EP fellow who confirmed she will see pt.  Spoke with Sidney MACIEL in Dr. Lutz's office twice to confirm that they do not admit, and will admit to Dr. Bangura, tele doc of the day spoke with Geovany the tele pa who is aware of pt and given signout

## 2018-04-03 NOTE — H&P PST ADULT - PROBLEM SELECTOR PLAN 2
pt instructed to take losartan on morning of surgery pt instructed to take losartan on morning of surgery  MIKE precaution recommended  OR booking notified via fax

## 2018-04-03 NOTE — ED PROVIDER NOTE - NS ED ROS FT
GENERAL: No fever or chills, EYES: no change in vision, HEENT: no trouble swallowing or speaking, CARDIAC: no chest pain, +palpitations PULMONARY: no cough or SOB, GI: no abdominal pain, no nausea, no vomiting, no diarrhea or constipation, : No changes in urination, SKIN: no rashes, NEURO: no headache,  MSK: No joint pain ~Leonardo Durand M.D. Resident

## 2018-04-03 NOTE — H&P ADULT - RS GEN PE MLT RESP DETAILS PC
breath sounds equal/no wheezes/respirations non-labored/no rales/clear to auscultation bilaterally/no rhonchi/no chest wall tenderness/good air movement/airway patent

## 2018-04-03 NOTE — ED PROVIDER NOTE - MEDICAL DECISION MAKING DETAILS
74 yo F PMHx Afib with multiple ablations in the past at [facility] (4 years ago and in Nov 2017) and cardioversion (Dec 2017) presents from pre-surgical testing with tachyarrhythmia, will obtain EKG, basic labs + CE, rhythm and rate control, consult EP, admit for cardiology work up

## 2018-04-03 NOTE — H&P ADULT - HISTORY OF PRESENT ILLNESS
74 yo F PMHx Afib with multiple ablations in the past at [facility] (4 years ago and in Nov 2017) and cardioversion (Dec 2017) sent from pre-surgical testing for rapid afib and palpitations. Pt recently found to have a long nodule that is scheduled to be removed on Friday (3/6/18). At pre-surgical testing today, she felt very anxious and palpitations, described as "heart racing." Upon checking her heart rate she was @ 160's bpm and was sent to [facility] Ed for evaluation. Pt denies chest pain, sob, diaphoresis, nausea, vomiting or abdominal pain.     In ED she found to have Rapid Afib @ 167bpm and was given multiple doses of IV Cardizem with partial rate control. On admission: she in Afib @ 114 bpm. Pt admits to drinking 3-4 glasses of wine daily. However, last intake of alcohol was on Easter Sunday (2 days ago) where she drank 1 bottle of wine. 74 yo F PMHx Afib with multiple ablations in the past at [facility] (4 years ago and in Nov 2017) and cardioversion (Dec 2017) sent from pre-surgical testing for rapid afib and palpitations. Pt recently found to have a long nodule that is scheduled for left VAT, left upper lobe wedge resection on Friday (3/6/18). At pre-surgical testing today, she felt very anxious and palpitations, described as "heart racing." Upon checking her heart rate she was @ 160's bpm and was sent to [facility] Ed for evaluation. Pt denies chest pain, sob, diaphoresis, nausea, vomiting or abdominal pain.     In ED she found to have Rapid Afib @ 167bpm and was given multiple doses of IV Cardizem with partial rate control. On admission: she in Afib @ 114 bpm. Pt admits to drinking 3-4 glasses of wine daily. However, last intake of alcohol was on Easter Sunday (2 days ago) where she drank 1 bottle of wine. 74 yo F PMHx Afib with multiple ablations in the past at [facility] (4 years ago and in Nov 2017) and cardioversion (Dec 2017) sent from pre-surgical testing for rapid afib and palpitations.   Pt recently found to have a long nodule that is scheduled for left VAT, left upper lobe wedge resection on Friday (3/6/18). At pre-surgical testing today, she felt very anxious and palpitations, described as "heart racing." Upon checking her heart rate she was @ 160's bpm and was sent to [facility] Ed for evaluation. Pt denies chest pain, sob, diaphoresis, nausea, vomiting or abdominal pain.     In ED she found to have Rapid Afib @ 167bpm and was given multiple doses of IV Cardizem with partial rate control.   On admission: she in Afib @ 114 bpm. Pt admits to drinking 3-4 glasses of wine daily. However, last intake of alcohol was on Easter Sunday (2 days ago) where she drank 1 bottle of wine.

## 2018-04-03 NOTE — CONSULT NOTE ADULT - ASSESSMENT
73 year old female with PMH of HTN, anxiety, depression, HLD, failed AFib with ablation x2 (4 years ago &  Nov 2017 at [facility])-on Xarelto, multiple cardioversion, recently diagnosed with L lung nodule pre-op L VATS this Friday.  Patient was seen at pre-surgical testing today and was noted in SVT at 167 bpm.  EKG and telemetry demonstrated persistent SVT -AT/AFib with RVR.  She is off AC for scheduled L lung nodule surgery on 4/6.  Rate control strategy with AV farzad agents is recommended given her refractory AFib despite ablation.    -Consider starting either Heparin infusion vs Lovenox for AC for now  -Rate control with Diltiazem 60 mg Q6 as tolerated 73 year old female with PMH of HTN, anxiety, depression, HLD, failed AFib with ablation x2 (4 years ago &  Nov 2017 at [facility])-on Xarelto, multiple cardioversion, recently diagnosed with L lung nodule pre-op L VATS this Friday.  Patient was seen at pre-surgical testing today and was noted in SVT at 167 bpm.  EKG and telemetry demonstrated persistent SVT -AT/AFib with RVR.  She is off AC for scheduled L lung nodule surgery on 4/6.  Rate control strategy with AV farzad agents is recommended given her refractory AFib despite ablation.    -Consider starting either Heparin infusion vs Lovenox for AC for now  -Rate control with Diltiazem 60 mg Q6 as tolerated  -Echocardiogram  -Discontinue Multaq

## 2018-04-03 NOTE — H&P ADULT - PROBLEM SELECTOR PLAN 2
CIWA protocol initiated  Monitor VS as per routine  Ativan IV symptom triggered  ORdered Thiamine, MVI and Folic acid  SW ordered  Instructed pt stop drinking

## 2018-04-03 NOTE — CONSULT NOTE ADULT - SUBJECTIVE AND OBJECTIVE BOX
CHIEF COMPLAINT:Patient is a 73y old  Female who presents with a chief complaint of palpitations (03 Apr 2018 14:05)      HISTORY OF PRESENT ILLNESS:  73 f with history as below , lung mass planned for resection admitted with afib with rvr  she has had history of afib s/p ablation   no cp or sob  pt drinks 1-2 bottle of wine every day  last drink 2 days ago   no n/v    PAST MEDICAL & SURGICAL HISTORY:  Alcohol abuse  Hypertension  Dyslipidemia  Depression  Atrial fibrillation  Arthritis  Anxiety  History of cardioversion: 12/2017  H/O prior ablation treatment: cardiac 11/2017          MEDICATIONS:  diltiazem Infusion 10 mG/Hr IV Continuous <Continuous>  dronedarone 400 milliGRAM(s) Oral two times a day  enoxaparin Injectable 80 milliGRAM(s) SubCutaneous every 12 hours        ALPRAZolam 1 milliGRAM(s) Oral every 8 hours PRN  LORazepam   Injectable 2 milliGRAM(s) IV Push every 2 hours PRN  venlafaxine XR. 150 milliGRAM(s) Oral daily    pantoprazole    Tablet 40 milliGRAM(s) Oral before breakfast      folic acid 1 milliGRAM(s) Oral daily  multivitamin 1 Tablet(s) Oral daily  thiamine 100 milliGRAM(s) Oral daily      FAMILY HISTORY:  Family history of breast cancer (Mother)      Non-contributory    SOCIAL HISTORY:    No tobacco, drugs or etoh    Allergies    No Known Allergies    Intolerances    	    REVIEW OF SYSTEMS:  as above  The rest of the 14 points ROS reviewed and except above they are unremarkable.        PHYSICAL EXAM:  T(C): 37 (04-03-18 @ 11:22), Max: 37 (04-03-18 @ 11:22)  HR: 113 (04-03-18 @ 14:31) (112 - 170)  BP: 111/70 (04-03-18 @ 14:31) (101/60 - 148/105)  RR: 18 (04-03-18 @ 14:05) (14 - 18)  SpO2: 100% (04-03-18 @ 14:05) (97% - 100%)  Wt(kg): --  I&O's Summary      JVP: Normal  Neck: supple  Lung: clear   CV: S1 S2 , Murmur:  Abd: soft  Ext: No edema  neuro: Awake / alert  Psych: flat affect  Skin: normal     LABS/DATA:    TELEMETRY: 	afib with RVR    ECG:  	   	  CARDIAC MARKERS:  Troponin T, Serum: < 0.06 ng/mL (04-03 @ 11:20)                                  13.1   7.40  )-----------( 181      ( 03 Apr 2018 15:01 )             39.6     04-03    140  |  100  |  10  ----------------------------<  87  5.2   |  26  |  0.56    Ca    8.7      03 Apr 2018 11:20    TPro  7.5  /  Alb  4.4  /  TBili  0.5  /  DBili  x   /  AST  47<H>  /  ALT  27  /  AlkPhos  82  04-03    proBNP:   Lipid Profile:   HgA1c:   TSH: Thyroid Stimulating Hormone, Serum: 2.83 uIU/mL (04-03 @ 11:20)

## 2018-04-04 LAB
BUN SERPL-MCNC: 6 MG/DL — LOW (ref 7–23)
C DIFF TOX GENS STL QL NAA+PROBE: SIGNIFICANT CHANGE UP
CALCIUM SERPL-MCNC: 8.2 MG/DL — LOW (ref 8.4–10.5)
CHLORIDE SERPL-SCNC: 99 MMOL/L — SIGNIFICANT CHANGE UP (ref 98–107)
CO2 SERPL-SCNC: 27 MMOL/L — SIGNIFICANT CHANGE UP (ref 22–31)
CREAT SERPL-MCNC: 0.48 MG/DL — LOW (ref 0.5–1.3)
GLUCOSE SERPL-MCNC: 93 MG/DL — SIGNIFICANT CHANGE UP (ref 70–99)
HCT VFR BLD CALC: 37.8 % — SIGNIFICANT CHANGE UP (ref 34.5–45)
HGB BLD-MCNC: 12.7 G/DL — SIGNIFICANT CHANGE UP (ref 11.5–15.5)
MCHC RBC-ENTMCNC: 33.6 % — SIGNIFICANT CHANGE UP (ref 32–36)
MCHC RBC-ENTMCNC: 33.8 PG — SIGNIFICANT CHANGE UP (ref 27–34)
MCV RBC AUTO: 100.5 FL — HIGH (ref 80–100)
NRBC # FLD: 0 — SIGNIFICANT CHANGE UP
PLATELET # BLD AUTO: 176 K/UL — SIGNIFICANT CHANGE UP (ref 150–400)
PMV BLD: 10.5 FL — SIGNIFICANT CHANGE UP (ref 7–13)
POTASSIUM SERPL-MCNC: 3.2 MMOL/L — LOW (ref 3.5–5.3)
POTASSIUM SERPL-SCNC: 3.2 MMOL/L — LOW (ref 3.5–5.3)
RBC # BLD: 3.76 M/UL — LOW (ref 3.8–5.2)
RBC # FLD: 13.8 % — SIGNIFICANT CHANGE UP (ref 10.3–14.5)
SODIUM SERPL-SCNC: 140 MMOL/L — SIGNIFICANT CHANGE UP (ref 135–145)
WBC # BLD: 4.27 K/UL — SIGNIFICANT CHANGE UP (ref 3.8–10.5)
WBC # FLD AUTO: 4.27 K/UL — SIGNIFICANT CHANGE UP (ref 3.8–10.5)

## 2018-04-04 PROCEDURE — 93010 ELECTROCARDIOGRAM REPORT: CPT | Mod: 76

## 2018-04-04 RX ORDER — POTASSIUM CHLORIDE 20 MEQ
40 PACKET (EA) ORAL EVERY 4 HOURS
Qty: 0 | Refills: 0 | Status: COMPLETED | OUTPATIENT
Start: 2018-04-04 | End: 2018-04-04

## 2018-04-04 RX ORDER — LOSARTAN POTASSIUM 100 MG/1
50 TABLET, FILM COATED ORAL DAILY
Qty: 0 | Refills: 0 | Status: DISCONTINUED | OUTPATIENT
Start: 2018-04-05 | End: 2018-04-06

## 2018-04-04 RX ORDER — CEFTRIAXONE 500 MG/1
1 INJECTION, POWDER, FOR SOLUTION INTRAMUSCULAR; INTRAVENOUS EVERY 24 HOURS
Qty: 0 | Refills: 0 | Status: DISCONTINUED | OUTPATIENT
Start: 2018-04-05 | End: 2018-04-06

## 2018-04-04 RX ORDER — DILTIAZEM HCL 120 MG
120 CAPSULE, EXT RELEASE 24 HR ORAL DAILY
Qty: 0 | Refills: 0 | Status: DISCONTINUED | OUTPATIENT
Start: 2018-04-04 | End: 2018-04-06

## 2018-04-04 RX ORDER — LOPERAMIDE HCL 2 MG
2 TABLET ORAL EVERY 6 HOURS
Qty: 0 | Refills: 0 | Status: DISCONTINUED | OUTPATIENT
Start: 2018-04-04 | End: 2018-04-06

## 2018-04-04 RX ORDER — CEFTRIAXONE 500 MG/1
1 INJECTION, POWDER, FOR SOLUTION INTRAMUSCULAR; INTRAVENOUS ONCE
Qty: 0 | Refills: 0 | Status: COMPLETED | OUTPATIENT
Start: 2018-04-04 | End: 2018-04-04

## 2018-04-04 RX ADMIN — Medication 40 MILLIEQUIVALENT(S): at 10:42

## 2018-04-04 RX ADMIN — Medication 1 TABLET(S): at 11:25

## 2018-04-04 RX ADMIN — Medication 1 MILLIGRAM(S): at 11:25

## 2018-04-04 RX ADMIN — OXYCODONE AND ACETAMINOPHEN 1 TABLET(S): 5; 325 TABLET ORAL at 00:30

## 2018-04-04 RX ADMIN — Medication 1 MILLIGRAM(S): at 09:42

## 2018-04-04 RX ADMIN — Medication 40 MILLIEQUIVALENT(S): at 17:04

## 2018-04-04 RX ADMIN — PANTOPRAZOLE SODIUM 40 MILLIGRAM(S): 20 TABLET, DELAYED RELEASE ORAL at 04:46

## 2018-04-04 RX ADMIN — LOSARTAN POTASSIUM 100 MILLIGRAM(S): 100 TABLET, FILM COATED ORAL at 04:45

## 2018-04-04 RX ADMIN — Medication 150 MILLIGRAM(S): at 11:25

## 2018-04-04 RX ADMIN — CEFTRIAXONE 100 GRAM(S): 500 INJECTION, POWDER, FOR SOLUTION INTRAMUSCULAR; INTRAVENOUS at 04:46

## 2018-04-04 RX ADMIN — Medication 2 MILLIGRAM(S): at 16:45

## 2018-04-04 RX ADMIN — Medication 1 MILLIGRAM(S): at 18:08

## 2018-04-04 RX ADMIN — ENOXAPARIN SODIUM 80 MILLIGRAM(S): 100 INJECTION SUBCUTANEOUS at 17:04

## 2018-04-04 RX ADMIN — Medication 40 MILLIEQUIVALENT(S): at 13:18

## 2018-04-04 RX ADMIN — ENOXAPARIN SODIUM 80 MILLIGRAM(S): 100 INJECTION SUBCUTANEOUS at 04:45

## 2018-04-04 RX ADMIN — Medication 100 MILLIGRAM(S): at 11:25

## 2018-04-04 RX ADMIN — Medication 2 MILLIGRAM(S): at 13:26

## 2018-04-04 RX ADMIN — Medication 120 MILLIGRAM(S): at 19:07

## 2018-04-04 NOTE — CHART NOTE - NSCHARTNOTEFT_GEN_A_CORE
Pt's UA + UCx ordered, Ceftriaxone 1 gm IVPB x 1 dose given   will follow    Urinalysis Basic - ( 2018 14:46 )    Color: YELLOW / Appearance: HAZY / S.020 / pH: 6.5  Gluc: NEGATIVE / Ketone: NEGATIVE  / Bili: NEGATIVE / Urobili: 0.2 mg/dL   Blood: SMALL / Protein: 30 mg/dL / Nitrite: NEGATIVE   Leuk Esterase: MODERATE / RBC: 5-10 / WBC >50   Sq Epi: OCC / Non Sq Epi: x / Bacteria: MANY                        13.1   7.40  )-----------( 181      ( 2018 15:01 )             39.6

## 2018-04-04 NOTE — PROGRESS NOTE ADULT - ASSESSMENT
afib with RVR  HR controlled   cont cardizem infusion   a/c   lovenox for now  echo  fu with EP    etoh withdrawal  ciwa protocol    lung mass  consult cts dr blancas to see if they want to intervene this admission

## 2018-04-04 NOTE — PROGRESS NOTE ADULT - SUBJECTIVE AND OBJECTIVE BOX
Subjective: Patient seen and examined. No new events except as noted.     SUBJECTIVE/ROS:  feels better       MEDICATIONS:  MEDICATIONS  (STANDING):  diltiazem Infusion 10 mG/Hr (10 mL/Hr) IV Continuous <Continuous>  enoxaparin Injectable 80 milliGRAM(s) SubCutaneous every 12 hours  folic acid 1 milliGRAM(s) Oral daily  losartan 100 milliGRAM(s) Oral daily  multivitamin 1 Tablet(s) Oral daily  pantoprazole    Tablet 40 milliGRAM(s) Oral before breakfast  thiamine 100 milliGRAM(s) Oral daily  venlafaxine XR. 150 milliGRAM(s) Oral daily      PHYSICAL EXAM:  T(C): 36.9 (04-04-18 @ 08:44), Max: 37 (04-03-18 @ 11:22)  HR: 83 (04-04-18 @ 08:44) (80 - 170)  BP: 114/74 (04-04-18 @ 08:44) (101/60 - 148/105)  RR: 18 (04-04-18 @ 08:44) (14 - 19)  SpO2: 98% (04-04-18 @ 08:44) (97% - 100%)  Wt(kg): --  I&O's Summary    03 Apr 2018 07:01  -  04 Apr 2018 07:00  --------------------------------------------------------  IN: 240 mL / OUT: 200 mL / NET: 40 mL      Height (cm): 165.1 (04-03 @ 14:05), 162.56 (04-03 @ 09:54)  Weight (kg): 77.1 (04-03 @ 14:05), 76.2 (04-03 @ 09:54)  BMI (kg/m2): 28.3 (04-03 @ 14:05), 28.8 (04-03 @ 09:54)  BSA (m2): 1.85 (04-03 @ 14:05), 1.82 (04-03 @ 09:54)    JVP: Normal  Neck: supple  Lung: clear   CV: S1 S2 , Murmur:  Abd: soft  Ext: No edema  neuro: Awake / alert  Psych: flat affect  Skin: normal       LABS/DATA:    CARDIAC MARKERS:  CARDIAC MARKERS ( 03 Apr 2018 20:00 )  x     / < 0.06 ng/mL / 61 u/L / 2.32 ng/mL / x      CARDIAC MARKERS ( 03 Apr 2018 11:20 )  x     / < 0.06 ng/mL / x     / x     / x                                    12.7   4.27  )-----------( 176      ( 04 Apr 2018 06:30 )             37.8     04-04    140  |  99  |  6<L>  ----------------------------<  93  3.2<L>   |  27  |  0.48<L>    Ca    8.2<L>      04 Apr 2018 06:30    TPro  7.5  /  Alb  4.4  /  TBili  0.5  /  DBili  x   /  AST  47<H>  /  ALT  27  /  AlkPhos  82  04-03    proBNP:   Lipid Profile:   HgA1c:   TSH: Thyroid Stimulating Hormone, Serum: 2.83 uIU/mL (04-03 @ 11:20)      TELE:  EKG:

## 2018-04-04 NOTE — PROGRESS NOTE ADULT - SUBJECTIVE AND OBJECTIVE BOX
_________________________________________________________________________________________  ========>>  M E D I C A L   A T T E N D I N G    F O L L O W  U P  N O T E  <<=========  -----------------------------------------------------------------------------------------------------    - Patient seen and examined by me earlier today.   - In summary, patient is a 73y year old woman who originally presented with   - Patient today overall doing ok, comfortable, eating OK. ambulating with assist. denies any symptoms (except mild lightheadedness in AM)    ==================>> REVIEW OF SYSTEM <<=================    GEN: no fever, no chills, no pain  RESP: no SOB, no cough, no sputum  CVS: no chest pain, no palpitations, no edema  GI: no abdominal pain, no nausea, no constipation, no diarrhea  : no dysuria, no frequency, no hematuria  Neuro: no headache, no dizziness  Derm : no itching, no rash    ==================>> PHYSICAL EXAM <<=================    GEN: A&O X 3 , NAD , comfortable  HEENT: NCAT, PERRL, MMM, hearing intact  Neck: supple , no JVD  CVS: S1S2 , regular , No M/R/G appreciated  PULM: CTA B/L,  no W/R/R appreciated  ABD.: soft. non tender, non distended,  bowel sounds present  Extrem: intact pulses , no edema   PSYCH : normal mood,  not anxious      ==================>> MEDICATIONS <<====================    MEDICATIONS  (STANDING):  diltiazem Infusion 10 mG/Hr (10 mL/Hr) IV Continuous <Continuous>  enoxaparin Injectable 80 milliGRAM(s) SubCutaneous every 12 hours  folic acid 1 milliGRAM(s) Oral daily  losartan 100 milliGRAM(s) Oral daily  multivitamin 1 Tablet(s) Oral daily  pantoprazole    Tablet 40 milliGRAM(s) Oral before breakfast  thiamine 100 milliGRAM(s) Oral daily  venlafaxine XR. 150 milliGRAM(s) Oral daily    MEDICATIONS  (PRN):  ALPRAZolam 1 milliGRAM(s) Oral every 8 hours PRN anxiety  LORazepam   Injectable 2 milliGRAM(s) IV Push every 2 hours PRN CIWA-Ar score increase by 2 points and a total score of 7 or less  oxyCODONE    5 mG/acetaminophen 325 mG 1 Tablet(s) Oral every 6 hours PRN moderate / severe    ==================>> VITAL SIGNS <<==================    T(C): 36.4 (18 @ 17:09), Max: 36.9 (18 @ 08:44)  HR: 110 (18 @ 17:09) (80 - 112)  BP: 114/79 (18 @ 17:09) (103/70 - 136/81)  RR: 18 (18 @ 17:09) (18 - 19)  SpO2: 98% (18 @ 17:09) (97% - 100%)    I&O's Summary    2018 07:01  -  2018 07:00  --------------------------------------------------------  IN: 240 mL / OUT: 200 mL / NET: 40 mL    2018 07:01  -  2018 17:59  --------------------------------------------------------  IN: 316 mL / OUT: 0 mL / NET: 316 mL     ==================>> LAB AND IMAGING <<==================                        12.7   4.27  )-----------( 176      ( 2018 06:30 )             37.8            140  |  99  |  6<L>  ----------------------------<  93  3.2<L>   |  27  |  0.48<L>    Ca    8.2<L>      2018 06:30    TPro  7.5  /  Alb  4.4  /  TBili  0.5  /  DBili  x   /  AST  47<H>  /  ALT  27  /  AlkPhos  82  0403    PT/INR - ( 2018 11:20 )   PT: 13.1 SEC;   INR: 1.18     PTT - ( 2018 11:20 )  PTT:24.1 SEC              CARDIAC MARKERS ( 2018 20:00 )  x     / < 0.06 ng/mL / 61 u/L / 2.32 ng/mL / x      CARDIAC MARKERS ( 2018 11:20 )  x     / < 0.06 ng/mL / x     / x     / x         Urinalysis Basic - ( 2018 14:46 )  Color: YELLOW / Appearance: HAZY / S.020 / pH: 6.5  Gluc: NEGATIVE / Ketone: NEGATIVE  / Bili: NEGATIVE / Urobili: 0.2 mg/dL   Blood: SMALL / Protein: 30 mg/dL / Nitrite: NEGATIVE   Leuk Esterase: MODERATE / RBC: 5-10 / WBC >50   Sq Epi: OCC / Non Sq Epi: x / Bacteria: MANY    TSH:      2.83   (18)           Lipid profile:  (18)     Total: 218     LDL  : 128     HDL  :91     TG   :74     ___________________________________________________________________________________  ===============>>  A S S E S S M E N T   A N D   P L A N <<===============  ------------------------------------------------------------------------------------------    · Assessment		      72 yo F PMHx Afib with multiple ablations in the past at [facility] (4 years ago and in 2017) and cardioversion (Dec 2017) sent from pre-surgical testing for palpitations, found to be in Rapid Afib @ 167bpm, admitted to tele for Rapid Afib, r/o ACS and Alcohol Withdrawal.    +Rapid Afib-->Rusty x2, Cardizem 60mg Q6h, TTE, IV Fluids hydration  +Alcohol Withdrawal-->CIWA protocol initiated, Folic Acid, Thiamine, MVI.    Problem/Plan - 1:  ·  Problem: Atrial fibrillation with RVR, overall improved  tele monitor   Therapeutic Lovenox 80mg SC Q12h   EP and cardio appreciated  Cardizem as ordered  TTE  IV Fluids hydration.     Problem/Plan - 2:  ·  Problem: Alcohol withdrawal, overall stable / improved   CIWA protocol initiated  Monitor VS as per routine  Ativan IV symptom triggered  ORdered Thiamine, MVI and Folic acid  SW ordered  Instructed pt stop drinking.   continue Venlafaxine for depression history     Problem/Plan - 3:  ·  Problem: UTI  continue Rocephin  follow up cultures    Problem/Plan - 4:  ·  Problem: Lung mass / nodule  CT Sx appreciated  plan for surgical resection when more stable    -GI/DVT Prophylaxis.    --------------------------------------------  Case discussed with pt, family  Education given on findings and plan of care  ___________________________  H. MAKI Silva.  Pager: [phone]

## 2018-04-04 NOTE — PHYSICAL THERAPY INITIAL EVALUATION ADULT - PERTINENT HX OF CURRENT PROBLEM, REHAB EVAL
72 yo F PMHx Afib with multiple ablations in the past at [facility] (4 years ago and in Nov 2017) and cardioversion (Dec 2017) sent from pre-surgical testing for rapid afib and palpitations.

## 2018-04-04 NOTE — PROGRESS NOTE ADULT - SUBJECTIVE AND OBJECTIVE BOX
Patient is a 73y old  Female who presents with a chief complaint of palpitations (2018 14:05)  Denies palpitations or SOB.  AFib with rate controlled most of time on Diltiazem gtt at 10mg/hr.      PAST MEDICAL & SURGICAL HISTORY:  Alcohol abuse  Hypertension  Dyslipidemia  Depression  Atrial fibrillation  Arthritis  Anxiety  History of cardioversion: 2017  H/O prior ablation treatment: cardiac 2017      MEDICATIONS  (STANDING):  diltiazem Infusion 10 mG/Hr (10 mL/Hr) IV Continuous <Continuous>  enoxaparin Injectable 80 milliGRAM(s) SubCutaneous every 12 hours  folic acid 1 milliGRAM(s) Oral daily  losartan 100 milliGRAM(s) Oral daily  multivitamin 1 Tablet(s) Oral daily  pantoprazole    Tablet 40 milliGRAM(s) Oral before breakfast  thiamine 100 milliGRAM(s) Oral daily  venlafaxine XR. 150 milliGRAM(s) Oral daily    MEDICATIONS  (PRN):  ALPRAZolam 1 milliGRAM(s) Oral every 8 hours PRN anxiety  LORazepam   Injectable 2 milliGRAM(s) IV Push every 2 hours PRN CIWA-Ar score increase by 2 points and a total score of 7 or less  oxyCODONE    5 mG/acetaminophen 325 mG 1 Tablet(s) Oral every 6 hours PRN moderate / severe            Vital Signs Last 24 Hrs  T(C): 36.9 (2018 08:44), Max: 37 (2018 11:22)  T(F): 98.5 (2018 08:44), Max: 98.6 (2018 11:22)  HR: 83 (2018 08:44) (80 - 170)  BP: 114/74 (2018 08:44) (101/60 - 148/105)  BP(mean): 108 (2018 09:15) (108 - 108)  RR: 18 (2018 08:44) (14 - 19)  SpO2: 98% (2018 08:44) (97% - 100%)            INTERPRETATION OF TELEMETRY: Afib 's bpm; occasionally up to 150-160 with exertion    ECG:        LABS:                        12.7   4.27  )-----------( 176      ( 2018 06:30 )             37.8     04-04    140  |  99  |  6<L>  ----------------------------<  93  3.2<L>   |  27  |  0.48<L>    Ca    8.2<L>      2018 06:30    TPro  7.5  /  Alb  4.4  /  TBili  0.5  /  DBili  x   /  AST  47<H>  /  ALT  27  /  AlkPhos  82  04-03    CARDIAC MARKERS ( 2018 20:00 )  x     / < 0.06 ng/mL / 61 u/L / 2.32 ng/mL / x      CARDIAC MARKERS ( 2018 11:20 )  x     / < 0.06 ng/mL / x     / x     / x          PT/INR - ( 2018 11:20 )   PT: 13.1 SEC;   INR: 1.18          PTT - ( 2018 11:20 )  PTT:24.1 SEC  Urinalysis Basic - ( 2018 14:46 )    Color: YELLOW / Appearance: HAZY / S.020 / pH: 6.5  Gluc: NEGATIVE / Ketone: NEGATIVE  / Bili: NEGATIVE / Urobili: 0.2 mg/dL   Blood: SMALL / Protein: 30 mg/dL / Nitrite: NEGATIVE   Leuk Esterase: MODERATE / RBC: 5-10 / WBC >50   Sq Epi: OCC / Non Sq Epi: x / Bacteria: MANY        BNP  RADIOLOGY & ADDITIONAL STUDIES:      PHYSICAL EXAM:    GENERAL: In no apparent distress, well nourished, and hydrated.  NECK: Supple and normal thyroid.  No JVD or carotid bruit.  Carotid pulse is 2+ bilaterally.  HEART: S1S2 irregularly irregular; No murmurs, rubs, or gallops.  PULMONARY: Clear to auscultation and perfusion.  No rales, wheezing, or rhonchi bilaterally.  ABDOMEN: Soft, Nontender, Nondistended; Bowel sounds present  EXTREMITIES:  2+ Peripheral Pulses, No clubbing, cyanosis, or edema  NEUROLOGICAL: Grossly nonfocal

## 2018-04-04 NOTE — PROGRESS NOTE ADULT - SUBJECTIVE AND OBJECTIVE BOX
Pt seen and examined with thoracic surgeon Dr Arroyo  Subjective: pt feels well, occasional palpitations currently on cardizem gtt SR 90s, denies SOB/CP    Vital Signs:  Vital Signs Last 24 Hrs  T(C): 36.9 (04-04-18 @ 08:44), Max: 37 (04-03-18 @ 11:22)  T(F): 98.5 (04-04-18 @ 08:44), Max: 98.6 (04-03-18 @ 11:22)  HR: 83 (04-04-18 @ 08:44) (80 - 170)  BP: 114/74 (04-04-18 @ 08:44) (101/60 - 148/105)  RR: 18 (04-04-18 @ 08:44) (14 - 19)  SpO2: 98% (04-04-18 @ 08:44) (97% - 100%) on (O2)    Telemetry/Alarms:  General: WN/WD NAD  Neurology: Awake, nonfocal, MOHAN x 4  Eyes: Scleras clear, PERRLA/ EOMI, Gross vision intact  ENT:Gross hearing intact, grossly patent pharynx, no stridor  Neck: Neck supple, trachea midline, No JVD,   Respiratory: CTA B/L, No wheezing, rales, rhonchi  CV: RRR, S1S2, no murmurs, rubs or gallops  Abdominal: Soft, NT, ND +BS,   Extremities: No edema, + peripheral pulses  Skin: No Rashes, Hematoma, Ecchymosis  Lymphatic: No Neck, axilla, groin LAD  Psych: Oriented x 3, normal affect    Relevant labs, radiology and Medications reviewed                        12.7   4.27  )-----------( 176      ( 04 Apr 2018 06:30 )             37.8     04-04    140  |  99  |  6<L>  ----------------------------<  93  3.2<L>   |  27  |  0.48<L>    Ca    8.2<L>      04 Apr 2018 06:30    TPro  7.5  /  Alb  4.4  /  TBili  0.5  /  DBili  x   /  AST  47<H>  /  ALT  27  /  AlkPhos  82  04-03    PT/INR - ( 03 Apr 2018 11:20 )   PT: 13.1 SEC;   INR: 1.18          PTT - ( 03 Apr 2018 11:20 )  PTT:24.1 SEC  MEDICATIONS  (STANDING):  diltiazem Infusion 10 mG/Hr (10 mL/Hr) IV Continuous <Continuous>  enoxaparin Injectable 80 milliGRAM(s) SubCutaneous every 12 hours  folic acid 1 milliGRAM(s) Oral daily  losartan 100 milliGRAM(s) Oral daily  multivitamin 1 Tablet(s) Oral daily  pantoprazole    Tablet 40 milliGRAM(s) Oral before breakfast  thiamine 100 milliGRAM(s) Oral daily  venlafaxine XR. 150 milliGRAM(s) Oral daily    MEDICATIONS  (PRN):  ALPRAZolam 1 milliGRAM(s) Oral every 8 hours PRN anxiety  LORazepam   Injectable 2 milliGRAM(s) IV Push every 2 hours PRN CIWA-Ar score increase by 2 points and a total score of 7 or less  oxyCODONE    5 mG/acetaminophen 325 mG 1 Tablet(s) Oral every 6 hours PRN moderate / severe    Pertinent Physical Exam  I&O's Summary    03 Apr 2018 07:01  -  04 Apr 2018 07:00  --------------------------------------------------------  IN: 240 mL / OUT: 200 mL / NET: 40 mL        Assessment  73y Female  w/ PAST MEDICAL & SURGICAL HISTORY:  Alcohol abuse  Hypertension  Dyslipidemia  Depression  Atrial fibrillation  Arthritis  Anxiety  History of cardioversion: 12/2017  H/O prior ablation treatment: cardiac 11/2017  admitted with complaints of Patient is a 73y old  Female who presents with a chief complaint of palpitations (03 Apr 2018 14:05)      Pt preop of LVATS, lung resection on Friday 4/6/18 pending optimization/clearance  PLAN  Continue care per cardiology  Will likely proceed with procedure on Friday  12 lead EKG ordered  CT chest noncontrast ordered urgently  discussed plan with RN and pt      please contact with concerns  Rosario MACIEL [id number] Pt seen and examined with thoracic surgeon Dr Arroyo  Subjective: pt feels well, occasional palpitations currently on cardizem gtt SR 90s, denies SOB/CP    Vital Signs:  Vital Signs Last 24 Hrs  T(C): 36.9 (04-04-18 @ 08:44), Max: 37 (04-03-18 @ 11:22)  T(F): 98.5 (04-04-18 @ 08:44), Max: 98.6 (04-03-18 @ 11:22)  HR: 83 (04-04-18 @ 08:44) (80 - 170)  BP: 114/74 (04-04-18 @ 08:44) (101/60 - 148/105)  RR: 18 (04-04-18 @ 08:44) (14 - 19)  SpO2: 98% (04-04-18 @ 08:44) (97% - 100%) on (O2)    Telemetry/Alarms:  General: WN/WD NAD  Neurology: Awake, nonfocal, MOHAN x 4  Eyes: Scleras clear, PERRLA/ EOMI, Gross vision intact  ENT:Gross hearing intact, grossly patent pharynx, no stridor  Neck: Neck supple, trachea midline, No JVD,   Respiratory: CTA B/L, No wheezing, rales, rhonchi  CV: RRR, S1S2, no murmurs, rubs or gallops  Abdominal: Soft, NT, ND +BS,   Extremities: No edema, + peripheral pulses  Skin: No Rashes, Hematoma, Ecchymosis  Lymphatic: No Neck, axilla, groin LAD  Psych: Oriented x 3, normal affect    Relevant labs, radiology and Medications reviewed                        12.7   4.27  )-----------( 176      ( 04 Apr 2018 06:30 )             37.8     04-04    140  |  99  |  6<L>  ----------------------------<  93  3.2<L>   |  27  |  0.48<L>    Ca    8.2<L>      04 Apr 2018 06:30    TPro  7.5  /  Alb  4.4  /  TBili  0.5  /  DBili  x   /  AST  47<H>  /  ALT  27  /  AlkPhos  82  04-03    PT/INR - ( 03 Apr 2018 11:20 )   PT: 13.1 SEC;   INR: 1.18          PTT - ( 03 Apr 2018 11:20 )  PTT:24.1 SEC  MEDICATIONS  (STANDING):  diltiazem Infusion 10 mG/Hr (10 mL/Hr) IV Continuous <Continuous>  enoxaparin Injectable 80 milliGRAM(s) SubCutaneous every 12 hours  folic acid 1 milliGRAM(s) Oral daily  losartan 100 milliGRAM(s) Oral daily  multivitamin 1 Tablet(s) Oral daily  pantoprazole    Tablet 40 milliGRAM(s) Oral before breakfast  thiamine 100 milliGRAM(s) Oral daily  venlafaxine XR. 150 milliGRAM(s) Oral daily    MEDICATIONS  (PRN):  ALPRAZolam 1 milliGRAM(s) Oral every 8 hours PRN anxiety  LORazepam   Injectable 2 milliGRAM(s) IV Push every 2 hours PRN CIWA-Ar score increase by 2 points and a total score of 7 or less  oxyCODONE    5 mG/acetaminophen 325 mG 1 Tablet(s) Oral every 6 hours PRN moderate / severe    Pertinent Physical Exam  I&O's Summary    03 Apr 2018 07:01  -  04 Apr 2018 07:00  --------------------------------------------------------  IN: 240 mL / OUT: 200 mL / NET: 40 mL        Assessment  73y Female  w/ PAST MEDICAL & SURGICAL HISTORY:  Alcohol abuse  Hypertension  Dyslipidemia  Depression  Atrial fibrillation  Arthritis  Anxiety  History of cardioversion: 12/2017  H/O prior ablation treatment: cardiac 11/2017  admitted with complaints of Patient is a 73y old  Female who presents with a chief complaint of palpitations (03 Apr 2018 14:05)      Pt preop of LVATS, lung resection on Friday 4/6/18 pending optimization/clearance  PLAN  Continue care per cardiology  Continue to hold xarelto in preparation for OR; pt currently on Lovenox BID  Will likely proceed with procedure on Friday  12 lead EKG ordered  CT chest noncontrast ordered urgently  discussed plan with RN and pt      please contact with concerns  Rosario MACIEL [id number]

## 2018-04-05 ENCOUNTER — TRANSCRIPTION ENCOUNTER (OUTPATIENT)
Age: 73
End: 2018-04-05

## 2018-04-05 LAB
BUN SERPL-MCNC: 9 MG/DL — SIGNIFICANT CHANGE UP (ref 7–23)
CALCIUM SERPL-MCNC: 8.7 MG/DL — SIGNIFICANT CHANGE UP (ref 8.4–10.5)
CHLORIDE SERPL-SCNC: 106 MMOL/L — SIGNIFICANT CHANGE UP (ref 98–107)
CO2 SERPL-SCNC: 23 MMOL/L — SIGNIFICANT CHANGE UP (ref 22–31)
CREAT SERPL-MCNC: 0.57 MG/DL — SIGNIFICANT CHANGE UP (ref 0.5–1.3)
GLUCOSE SERPL-MCNC: 115 MG/DL — HIGH (ref 70–99)
HCT VFR BLD CALC: 36.7 % — SIGNIFICANT CHANGE UP (ref 34.5–45)
HGB BLD-MCNC: 12 G/DL — SIGNIFICANT CHANGE UP (ref 11.5–15.5)
MAGNESIUM SERPL-MCNC: 1.7 MG/DL — SIGNIFICANT CHANGE UP (ref 1.6–2.6)
MCHC RBC-ENTMCNC: 32.7 % — SIGNIFICANT CHANGE UP (ref 32–36)
MCHC RBC-ENTMCNC: 33.8 PG — SIGNIFICANT CHANGE UP (ref 27–34)
MCV RBC AUTO: 103.4 FL — HIGH (ref 80–100)
NRBC # FLD: 0 — SIGNIFICANT CHANGE UP
PHOSPHATE SERPL-MCNC: 2.5 MG/DL — SIGNIFICANT CHANGE UP (ref 2.5–4.5)
PLATELET # BLD AUTO: 175 K/UL — SIGNIFICANT CHANGE UP (ref 150–400)
PMV BLD: 10.9 FL — SIGNIFICANT CHANGE UP (ref 7–13)
POTASSIUM SERPL-MCNC: 4.1 MMOL/L — SIGNIFICANT CHANGE UP (ref 3.5–5.3)
POTASSIUM SERPL-SCNC: 4.1 MMOL/L — SIGNIFICANT CHANGE UP (ref 3.5–5.3)
RBC # BLD: 3.55 M/UL — LOW (ref 3.8–5.2)
RBC # FLD: 14.3 % — SIGNIFICANT CHANGE UP (ref 10.3–14.5)
SODIUM SERPL-SCNC: 140 MMOL/L — SIGNIFICANT CHANGE UP (ref 135–145)
SPECIMEN SOURCE: SIGNIFICANT CHANGE UP
WBC # BLD: 4.54 K/UL — SIGNIFICANT CHANGE UP (ref 3.8–10.5)
WBC # FLD AUTO: 4.54 K/UL — SIGNIFICANT CHANGE UP (ref 3.8–10.5)

## 2018-04-05 PROCEDURE — 93306 TTE W/DOPPLER COMPLETE: CPT | Mod: 26

## 2018-04-05 PROCEDURE — 71250 CT THORAX DX C-: CPT | Mod: 26

## 2018-04-05 RX ADMIN — Medication 1 MILLIGRAM(S): at 11:46

## 2018-04-05 RX ADMIN — Medication 1 MILLIGRAM(S): at 21:40

## 2018-04-05 RX ADMIN — Medication 1 TABLET(S): at 11:46

## 2018-04-05 RX ADMIN — OXYCODONE AND ACETAMINOPHEN 1 TABLET(S): 5; 325 TABLET ORAL at 12:15

## 2018-04-05 RX ADMIN — PANTOPRAZOLE SODIUM 40 MILLIGRAM(S): 20 TABLET, DELAYED RELEASE ORAL at 05:32

## 2018-04-05 RX ADMIN — LOSARTAN POTASSIUM 50 MILLIGRAM(S): 100 TABLET, FILM COATED ORAL at 05:32

## 2018-04-05 RX ADMIN — Medication 120 MILLIGRAM(S): at 11:46

## 2018-04-05 RX ADMIN — CEFTRIAXONE 100 GRAM(S): 500 INJECTION, POWDER, FOR SOLUTION INTRAMUSCULAR; INTRAVENOUS at 05:33

## 2018-04-05 RX ADMIN — Medication 150 MILLIGRAM(S): at 11:46

## 2018-04-05 RX ADMIN — ENOXAPARIN SODIUM 80 MILLIGRAM(S): 100 INJECTION SUBCUTANEOUS at 05:33

## 2018-04-05 RX ADMIN — OXYCODONE AND ACETAMINOPHEN 1 TABLET(S): 5; 325 TABLET ORAL at 11:16

## 2018-04-05 RX ADMIN — Medication 1 MILLIGRAM(S): at 10:16

## 2018-04-05 RX ADMIN — Medication 100 MILLIGRAM(S): at 11:46

## 2018-04-05 NOTE — PROGRESS NOTE ADULT - SUBJECTIVE AND OBJECTIVE BOX
_________________________________________________________________________________________  ========>>  M E D I C A L   A T T E N D I N G    F O L L O W  U P  N O T E  <<=========  -----------------------------------------------------------------------------------------------------    - Patient seen and examined by me earlier today.   - In summary, patient is a 73y year old woman who originally presented with tachycardia   - Patient today overall doing ok, comfortable, eating OK. ambulating with assist. denies any symptoms       overall doing better OOB with assist to bathroom..     ==================>> REVIEW OF SYSTEM <<=================    GEN: no fever, no chills, no pain  RESP: no SOB, no cough, no sputum  CVS: no chest pain, no palpitations, no edema  GI: no abdominal pain, no nausea, no constipation, no diarrhea  : no dysuria, no frequency, no hematuria  Neuro: no headache, no dizziness  Derm : no itching, no rash    ==================>> PHYSICAL EXAM <<=================    GEN: A&O X 3 , NAD , comfortable  HEENT: NCAT, PERRL, MMM, hearing intact  Neck: supple , no JVD  CVS: S1S2 , regular , No M/R/G appreciated  PULM: CTA B/L,  no W/R/R appreciated  ABD.: soft. non tender, non distended,  bowel sounds present  Extrem: intact pulses , no edema   PSYCH : normal mood,  not anxious       ==================>> MEDICATIONS <<====================    cefTRIAXone   IVPB 1 Gram(s) IV Intermittent every 24 hours  diltiazem    milliGRAM(s) Oral daily  diltiazem Infusion 10 mG/Hr IV Continuous <Continuous>  folic acid 1 milliGRAM(s) Oral daily  losartan 50 milliGRAM(s) Oral daily  multivitamin 1 Tablet(s) Oral daily  pantoprazole    Tablet 40 milliGRAM(s) Oral before breakfast  thiamine 100 milliGRAM(s) Oral daily  venlafaxine XR. 150 milliGRAM(s) Oral daily    MEDICATIONS  (PRN):  ALPRAZolam 1 milliGRAM(s) Oral every 8 hours PRN anxiety  loperamide 2 milliGRAM(s) Oral every 6 hours PRN Diarrhea or loose BMs  LORazepam   Injectable 2 milliGRAM(s) IV Push every 2 hours PRN CIWA-Ar score increase by 2 points and a total score of 7 or less  oxyCODONE    5 mG/acetaminophen 325 mG 1 Tablet(s) Oral every 6 hours PRN moderate / severe    ==================>> VITAL SIGNS <<==================    Vital Signs Last 24 Hrs  T(C): 36.9 (04-05-18 @ 15:16)  T(F): 98.4 (04-05-18 @ 15:16), Max: 98.4 (04-05-18 @ 15:16)  HR: 65 (04-05-18 @ 15:16) (56 - 72)  BP: 109/65 (04-05-18 @ 15:16)  BP(mean): --  RR: 18 (04-05-18 @ 15:16) (18 - 18)  SpO2: 98% (04-05-18 @ 15:16) (98% - 99%)         ==================>> LAB AND IMAGING <<==================                        12.0   4.54  )-----------( 175      ( 05 Apr 2018 08:18 )             36.7        04-05    140  |  106  |  9   ----------------------------<  115<H>  4.1   |  23  |  0.57    Ca    8.7      05 Apr 2018 08:18  Phos  2.5     04-05  Mg     1.7     04-05    pending Echo     _______________________  C U L T U R E S :    Culture - Urine (collected 04 Apr 2018 04:17)  Source: URINE MIDSTREAM  Preliminary Report (05 Apr 2018 10:41):    GNRID^Gram Neg Claus To Be Identified    COLONY COUNT: > = 100,000 CFU/ML    ___________________________________________________________________________________  ===============>>  A S S E S S M E N T   A N D   P L A N <<===============  ------------------------------------------------------------------------------------------    · Assessment		      74 yo F PMHx Afib with multiple ablations in the past at [facility] (4 years ago and in Nov 2017) and cardioversion (Dec 2017) sent from pre-surgical testing for palpitations, found to be in Rapid Afib @ 167bpm, admitted to tele for Rapid Afib, r/o ACS and Alcohol Withdrawal.  +Rapid Afib  +Alcohol Withdrawal      Problem/Plan - 1:  ·  Problem: Atrial fibrillation with RVR, overall improved  tele monitor   Therapeutic Lovenox 80mg SC Q12h   EP and cardio appreciated  Cardizem as ordered  TTE pending   encourage PO hydration.     Problem/Plan - 2:  ·  Problem: Alcohol withdrawal, overall stable / improved   CIWA protocol as above: PRN only   Monitor VS as per routine  continue Venlafaxine for depression history     Problem/Plan - 3:  ·  Problem: UTI  continue Rocephin  follow up cultures    Problem/Plan - 4:  ·  Problem: Lung mass / nodule  CT Sx appreciated  plan for surgical resection tomorrow  no objection medically to proceed to OR as planned     Cardio requesting Echo: pending     close telemetry monitoring Emily-Op     -GI/DVT Prophylaxis.    --------------------------------------------  Case discussed with pt, family, CT Sx, staff  Education given on findings and plan of care  ___________________________  H. MAKI Silva.  Pager: [phone]

## 2018-04-05 NOTE — PROGRESS NOTE ADULT - SUBJECTIVE AND OBJECTIVE BOX
Patient seen and evaluated today.  No significant events overnight.  Feels generally well.  Telemetry review now demonstrates NSR with heart rates HR: 61 (04-05-18 @ 16:40) (56 - 72).    MEDICATIONS:  diltiazem    milliGRAM(s) Oral daily  diltiazem Infusion 10 mG/Hr IV Continuous <Continuous>  losartan 50 milliGRAM(s) Oral daily    ALPRAZolam 1 milliGRAM(s) Oral every 8 hours PRN  cefTRIAXone   IVPB 1 Gram(s) IV Intermittent every 24 hours  diltiazem    milliGRAM(s) Oral daily  diltiazem Infusion 10 mG/Hr IV Continuous <Continuous>  folic acid 1 milliGRAM(s) Oral daily  loperamide 2 milliGRAM(s) Oral every 6 hours PRN  LORazepam   Injectable 2 milliGRAM(s) IV Push every 2 hours PRN  losartan 50 milliGRAM(s) Oral daily  multivitamin 1 Tablet(s) Oral daily  oxyCODONE    5 mG/acetaminophen 325 mG 1 Tablet(s) Oral every 6 hours PRN  pantoprazole Tablet 40 milliGRAM(s) Oral before breakfast  thiamine 100 milliGRAM(s) Oral daily  venlafaxine XR. 150 milliGRAM(s) Oral daily    REVIEW OF SYSTEMS:  CONSTITUTIONAL: No fever, weight loss, or fatigue  NECK: No pain or stiffness  RESPIRATORY: No cough, wheezing, chills or hemoptysis; No Shortness of Breath  CARDIOVASCULAR: No chest pain, palpitations, passing out, dizziness, or leg swelling  GASTROINTESTINAL: No abdominal or epigastric pain. No nausea, vomiting, or hematemesis; No diarrhea or constipation. No melena or hematochezia.  NEUROLOGICAL: No headaches, memory loss, loss of strength, numbness, or tremors  SKIN: No itching, burning, rashes, or lesions   PSYCHIATRIC: No depression, anxiety, mood swings, or difficulty sleeping  HEME/LYMPH: No easy bruising, or bleeding gums  ALLERGY AND IMMUNOLOGIC: No hives or eczema	  All others negative	    PHYSICAL EXAM:  T(C): 36.9 (04-05-18 @ 16:40), Max: 36.9 (04-05-18 @ 15:16)  HR: 61 (04-05-18 @ 16:40) (56 - 72)  BP: 113/75 (04-05-18 @ 16:40) (107/65 - 125/73)  RR: 18 (04-05-18 @ 16:40) (18 - 18)  SpO2: 96% (04-05-18 @ 16:40) (96% - 99%)  Wt(kg): --  I&O's Summary    04 Apr 2018 07:01  -  05 Apr 2018 07:00  --------------------------------------------------------  IN: 316 mL / OUT: 0 mL / NET: 316 mL    05 Apr 2018 07:01  -  05 Apr 2018 20:27  --------------------------------------------------------  IN: 700 mL / OUT: 0 mL / NET: 700 mL    Appearance: Normal	  HEENT:   Normal oral mucosa, PERRL, EOMI	  Lymphatic: No lymphadenopathy  Cardiovascular: Normal S1 S2, No JVD, No murmurs, No edema  Respiratory: Lungs clear to auscultation	  Psychiatry: A & O x 3, Mood & affect appropriate  Gastrointestinal:  Soft, Non-tender, + BS	  Skin: No rashes, No ecchymoses, No cyanosis	  Neurologic: Non-focal  Extremities: Normal range of motion, No clubbing, cyanosis or edema  Vascular: Peripheral pulses palpable 2+ bilaterally    DIAGNOSTICS:  TELEMETRY: NSR 60-70S 	    LABS:	 	                          12.0   4.54  )-----------( 175      ( 05 Apr 2018 08:18 )             36.7     04-05    140  |  106  |  9   ----------------------------<  115<H>  4.1   |  23  |  0.57    Ca    8.7      05 Apr 2018 08:18  Phos  2.5     04-05  Mg     1.7     04-05      proBNP:

## 2018-04-05 NOTE — PROGRESS NOTE ADULT - ASSESSMENT
afib with RVR  HR controlled now in sinus   cont cardizem infusion   a/c   lovenox for now  echo  fu with EP    etoh withdrawal  ciwa protocol  stable     lung mass  plan for resection  HR stable   please obtain echo prior to OR

## 2018-04-05 NOTE — PROGRESS NOTE ADULT - SUBJECTIVE AND OBJECTIVE BOX
Subjective: Patient seen and examined. No new events except as noted.     SUBJECTIVE/ROS:  No chest pain, dyspnea, palpitation, or dizziness.       MEDICATIONS:  MEDICATIONS  (STANDING):  cefTRIAXone   IVPB 1 Gram(s) IV Intermittent every 24 hours  diltiazem    milliGRAM(s) Oral daily  diltiazem Infusion 10 mG/Hr (10 mL/Hr) IV Continuous <Continuous>  folic acid 1 milliGRAM(s) Oral daily  losartan 50 milliGRAM(s) Oral daily  multivitamin 1 Tablet(s) Oral daily  pantoprazole    Tablet 40 milliGRAM(s) Oral before breakfast  thiamine 100 milliGRAM(s) Oral daily  venlafaxine XR. 150 milliGRAM(s) Oral daily      PHYSICAL EXAM:  T(C): 36.9 (04-05-18 @ 15:16), Max: 36.9 (04-05-18 @ 15:16)  HR: 65 (04-05-18 @ 15:16) (56 - 110)  BP: 109/65 (04-05-18 @ 15:16) (107/65 - 125/73)  RR: 18 (04-05-18 @ 15:16) (18 - 18)  SpO2: 98% (04-05-18 @ 15:16) (98% - 99%)  Wt(kg): --  I&O's Summary    04 Apr 2018 07:01  -  05 Apr 2018 07:00  --------------------------------------------------------  IN: 316 mL / OUT: 0 mL / NET: 316 mL    05 Apr 2018 07:01  -  05 Apr 2018 16:44  --------------------------------------------------------  IN: 700 mL / OUT: 0 mL / NET: 700 mL      Height (cm): 165.1 (04-05 @ 16:15)  Weight (kg): 77.1 (04-05 @ 16:15)  BMI (kg/m2): 28.3 (04-05 @ 16:15)  BSA (m2): 1.85 (04-05 @ 16:15)  JVP: Normal  Neck: supple  Lung: clear   CV: S1 S2 , Murmur:  Abd: soft  Ext: No edema  neuro: Awake / alert  Psych: flat affect  Skin: normal     LABS/DATA:    CARDIAC MARKERS:  CARDIAC MARKERS ( 03 Apr 2018 20:00 )  x     / < 0.06 ng/mL / 61 u/L / 2.32 ng/mL / x      CARDIAC MARKERS ( 03 Apr 2018 11:20 )  x     / < 0.06 ng/mL / x     / x     / x                                    12.0   4.54  )-----------( 175      ( 05 Apr 2018 08:18 )             36.7     04-05    140  |  106  |  9   ----------------------------<  115<H>  4.1   |  23  |  0.57    Ca    8.7      05 Apr 2018 08:18  Phos  2.5     04-05  Mg     1.7     04-05      proBNP:   Lipid Profile:   HgA1c:   TSH:     TELE:  EKG:

## 2018-04-05 NOTE — PROGRESS NOTE ADULT - ASSESSMENT
73 year old female who presented with KYARA who has a PMH of HTN, anxiety, depression, HLD, failed AFib with ablation x2 at [facility] on Xarelto, multiple cardioversions now with recently diagnosed with L lung nodule pre-op L VATS/ wedge resection scheduled for tomorrow:    - will discuss with EP attending  - remains on IV Diltiazem, now in NSR --  no longer on Multaq   - keep electrolytes optimized  - we will follow 73 year old female who presented with KYARA who has a PMH of HTN, anxiety, depression, HLD, failed AFib with ablation x2 at [facility] on Xarelto, multiple cardioversions now with recently diagnosed with L lung nodule pre-op L VATS/ wedge resection scheduled for tomorrow; now in NSR, brief pSVT/AT on exertion today:    - will discuss with EP attending  - remains on IV Diltiazem --  no longer on Multaq   - keep electrolytes optimized  - we will follow

## 2018-04-06 ENCOUNTER — RESULT REVIEW (OUTPATIENT)
Age: 73
End: 2018-04-06

## 2018-04-06 ENCOUNTER — APPOINTMENT (OUTPATIENT)
Dept: THORACIC SURGERY | Facility: HOSPITAL | Age: 73
End: 2018-04-06
Payer: MEDICARE

## 2018-04-06 LAB
-  AMIKACIN: SIGNIFICANT CHANGE UP
-  AMPICILLIN/SULBACTAM: SIGNIFICANT CHANGE UP
-  AMPICILLIN: SIGNIFICANT CHANGE UP
-  AZTREONAM: SIGNIFICANT CHANGE UP
-  CEFAZOLIN: SIGNIFICANT CHANGE UP
-  CEFEPIME: SIGNIFICANT CHANGE UP
-  CEFOXITIN: SIGNIFICANT CHANGE UP
-  CEFTAZIDIME: SIGNIFICANT CHANGE UP
-  CEFTRIAXONE: SIGNIFICANT CHANGE UP
-  CIPROFLOXACIN: SIGNIFICANT CHANGE UP
-  ERTAPENEM: SIGNIFICANT CHANGE UP
-  GENTAMICIN: SIGNIFICANT CHANGE UP
-  IMIPENEM: SIGNIFICANT CHANGE UP
-  LEVOFLOXACIN: SIGNIFICANT CHANGE UP
-  MEROPENEM: SIGNIFICANT CHANGE UP
-  NITROFURANTOIN: SIGNIFICANT CHANGE UP
-  PIPERACILLIN/TAZOBACTAM: SIGNIFICANT CHANGE UP
-  TIGECYCLINE: SIGNIFICANT CHANGE UP
-  TOBRAMYCIN: SIGNIFICANT CHANGE UP
-  TRIMETHOPRIM/SULFAMETHOXAZOLE: SIGNIFICANT CHANGE UP
APTT BLD: 23.1 SEC — LOW (ref 27.5–37.4)
BACTERIA UR CULT: SIGNIFICANT CHANGE UP
BASE EXCESS BLDA CALC-SCNC: -2.4 MMOL/L — SIGNIFICANT CHANGE UP
BASE EXCESS BLDA CALC-SCNC: -2.8 MMOL/L — SIGNIFICANT CHANGE UP
BLD GP AB SCN SERPL QL: NEGATIVE — SIGNIFICANT CHANGE UP
BUN SERPL-MCNC: 10 MG/DL — SIGNIFICANT CHANGE UP (ref 7–23)
BUN SERPL-MCNC: 8 MG/DL — SIGNIFICANT CHANGE UP (ref 7–23)
CA-I BLD-SCNC: 1.19 MMOL/L — SIGNIFICANT CHANGE UP (ref 1.03–1.23)
CA-I BLDA-SCNC: 1.17 MMOL/L — SIGNIFICANT CHANGE UP (ref 1.15–1.29)
CALCIUM SERPL-MCNC: 8.1 MG/DL — LOW (ref 8.4–10.5)
CALCIUM SERPL-MCNC: 9.2 MG/DL — SIGNIFICANT CHANGE UP (ref 8.4–10.5)
CHLORIDE BLDA-SCNC: 108 MMOL/L — SIGNIFICANT CHANGE UP (ref 96–108)
CHLORIDE SERPL-SCNC: 102 MMOL/L — SIGNIFICANT CHANGE UP (ref 98–107)
CHLORIDE SERPL-SCNC: 103 MMOL/L — SIGNIFICANT CHANGE UP (ref 98–107)
CO2 SERPL-SCNC: 22 MMOL/L — SIGNIFICANT CHANGE UP (ref 22–31)
CO2 SERPL-SCNC: 25 MMOL/L — SIGNIFICANT CHANGE UP (ref 22–31)
CREAT BLDA-MCNC: < 0.34 MG/DL — LOW (ref 0.5–1.3)
CREAT SERPL-MCNC: 0.46 MG/DL — LOW (ref 0.5–1.3)
CREAT SERPL-MCNC: 0.56 MG/DL — SIGNIFICANT CHANGE UP (ref 0.5–1.3)
GLUCOSE BLDA-MCNC: 171 MG/DL — HIGH (ref 70–99)
GLUCOSE BLDA-MCNC: 173 MG/DL — HIGH (ref 70–99)
GLUCOSE SERPL-MCNC: 192 MG/DL — HIGH (ref 70–99)
GLUCOSE SERPL-MCNC: 94 MG/DL — SIGNIFICANT CHANGE UP (ref 70–99)
HCO3 BLDA-SCNC: 22 MMOL/L — SIGNIFICANT CHANGE UP (ref 22–26)
HCO3 BLDA-SCNC: 23 MMOL/L — SIGNIFICANT CHANGE UP (ref 22–26)
HCT VFR BLD CALC: 36.5 % — SIGNIFICANT CHANGE UP (ref 34.5–45)
HCT VFR BLD CALC: 39 % — SIGNIFICANT CHANGE UP (ref 34.5–45)
HCT VFR BLDA CALC: 35.8 % — SIGNIFICANT CHANGE UP (ref 34.5–46.5)
HCT VFR BLDA CALC: 38.2 % — SIGNIFICANT CHANGE UP (ref 34.5–46.5)
HGB BLD-MCNC: 12.2 G/DL — SIGNIFICANT CHANGE UP (ref 11.5–15.5)
HGB BLD-MCNC: 12.9 G/DL — SIGNIFICANT CHANGE UP (ref 11.5–15.5)
HGB BLDA-MCNC: 11.6 G/DL — SIGNIFICANT CHANGE UP (ref 11.5–15.5)
HGB BLDA-MCNC: 12.4 G/DL — SIGNIFICANT CHANGE UP (ref 11.5–15.5)
INR BLD: 1.02 — SIGNIFICANT CHANGE UP (ref 0.88–1.17)
LACTATE BLDA-SCNC: 1.4 MMOL/L — SIGNIFICANT CHANGE UP (ref 0.5–2)
MCHC RBC-ENTMCNC: 33.1 % — SIGNIFICANT CHANGE UP (ref 32–36)
MCHC RBC-ENTMCNC: 33.2 PG — SIGNIFICANT CHANGE UP (ref 27–34)
MCHC RBC-ENTMCNC: 33.4 % — SIGNIFICANT CHANGE UP (ref 32–36)
MCHC RBC-ENTMCNC: 34.3 PG — HIGH (ref 27–34)
MCV RBC AUTO: 100.5 FL — HIGH (ref 80–100)
MCV RBC AUTO: 102.5 FL — HIGH (ref 80–100)
METHOD TYPE: SIGNIFICANT CHANGE UP
NRBC # FLD: 0 — SIGNIFICANT CHANGE UP
NRBC # FLD: 0 — SIGNIFICANT CHANGE UP
ORGANISM # SPEC MICROSCOPIC CNT: SIGNIFICANT CHANGE UP
ORGANISM # SPEC MICROSCOPIC CNT: SIGNIFICANT CHANGE UP
PCO2 BLDA: 38 MMHG — SIGNIFICANT CHANGE UP (ref 32–48)
PCO2 BLDA: 42 MMHG — SIGNIFICANT CHANGE UP (ref 32–48)
PH BLDA: 7.34 PH — LOW (ref 7.35–7.45)
PH BLDA: 7.39 PH — SIGNIFICANT CHANGE UP (ref 7.35–7.45)
PLATELET # BLD AUTO: 179 K/UL — SIGNIFICANT CHANGE UP (ref 150–400)
PLATELET # BLD AUTO: 195 K/UL — SIGNIFICANT CHANGE UP (ref 150–400)
PMV BLD: 10.6 FL — SIGNIFICANT CHANGE UP (ref 7–13)
PMV BLD: 10.7 FL — SIGNIFICANT CHANGE UP (ref 7–13)
PO2 BLDA: 159 MMHG — HIGH (ref 83–108)
PO2 BLDA: 181 MMHG — HIGH (ref 83–108)
POTASSIUM BLDA-SCNC: 3 MMOL/L — LOW (ref 3.4–4.5)
POTASSIUM BLDA-SCNC: 3.1 MMOL/L — LOW (ref 3.4–4.5)
POTASSIUM SERPL-MCNC: 3.2 MMOL/L — LOW (ref 3.5–5.3)
POTASSIUM SERPL-MCNC: 3.8 MMOL/L — SIGNIFICANT CHANGE UP (ref 3.5–5.3)
POTASSIUM SERPL-SCNC: 3.2 MMOL/L — LOW (ref 3.5–5.3)
POTASSIUM SERPL-SCNC: 3.8 MMOL/L — SIGNIFICANT CHANGE UP (ref 3.5–5.3)
PROTHROM AB SERPL-ACNC: 11.3 SEC — SIGNIFICANT CHANGE UP (ref 9.8–13.1)
RBC # BLD: 3.56 M/UL — LOW (ref 3.8–5.2)
RBC # BLD: 3.88 M/UL — SIGNIFICANT CHANGE UP (ref 3.8–5.2)
RBC # FLD: 13.4 % — SIGNIFICANT CHANGE UP (ref 10.3–14.5)
RBC # FLD: 13.6 % — SIGNIFICANT CHANGE UP (ref 10.3–14.5)
RH IG SCN BLD-IMP: POSITIVE — SIGNIFICANT CHANGE UP
SAO2 % BLDA: 99.1 % — HIGH (ref 95–99)
SAO2 % BLDA: 99.3 % — HIGH (ref 95–99)
SODIUM BLDA-SCNC: 138 MMOL/L — SIGNIFICANT CHANGE UP (ref 136–146)
SODIUM BLDA-SCNC: 139 MMOL/L — SIGNIFICANT CHANGE UP (ref 136–146)
SODIUM SERPL-SCNC: 139 MMOL/L — SIGNIFICANT CHANGE UP (ref 135–145)
SODIUM SERPL-SCNC: 140 MMOL/L — SIGNIFICANT CHANGE UP (ref 135–145)
WBC # BLD: 10.65 K/UL — HIGH (ref 3.8–10.5)
WBC # BLD: 4.57 K/UL — SIGNIFICANT CHANGE UP (ref 3.8–10.5)
WBC # FLD AUTO: 10.65 K/UL — HIGH (ref 3.8–10.5)
WBC # FLD AUTO: 4.57 K/UL — SIGNIFICANT CHANGE UP (ref 3.8–10.5)

## 2018-04-06 PROCEDURE — 88305 TISSUE EXAM BY PATHOLOGIST: CPT | Mod: 26

## 2018-04-06 PROCEDURE — 88309 TISSUE EXAM BY PATHOLOGIST: CPT | Mod: 26

## 2018-04-06 PROCEDURE — 32674 THORACOSCOPY LYMPH NODE EXC: CPT | Mod: 80

## 2018-04-06 PROCEDURE — 71045 X-RAY EXAM CHEST 1 VIEW: CPT | Mod: 26

## 2018-04-06 PROCEDURE — 32663 THORACOSCOPY W/LOBECTOMY: CPT

## 2018-04-06 PROCEDURE — 88331 PATH CONSLTJ SURG 1 BLK 1SPC: CPT | Mod: 26

## 2018-04-06 PROCEDURE — 88313 SPECIAL STAINS GROUP 2: CPT | Mod: 26

## 2018-04-06 PROCEDURE — 32663 THORACOSCOPY W/LOBECTOMY: CPT | Mod: 80

## 2018-04-06 PROCEDURE — 99233 SBSQ HOSP IP/OBS HIGH 50: CPT

## 2018-04-06 PROCEDURE — 32674 THORACOSCOPY LYMPH NODE EXC: CPT

## 2018-04-06 RX ORDER — METOCLOPRAMIDE HCL 10 MG
10 TABLET ORAL ONCE
Qty: 0 | Refills: 0 | Status: COMPLETED | OUTPATIENT
Start: 2018-04-06 | End: 2018-04-06

## 2018-04-06 RX ORDER — SODIUM CHLORIDE 9 MG/ML
1000 INJECTION, SOLUTION INTRAVENOUS
Qty: 0 | Refills: 0 | Status: DISCONTINUED | OUTPATIENT
Start: 2018-04-06 | End: 2018-04-09

## 2018-04-06 RX ORDER — HYDROMORPHONE HYDROCHLORIDE 2 MG/ML
0.5 INJECTION INTRAMUSCULAR; INTRAVENOUS; SUBCUTANEOUS
Qty: 0 | Refills: 0 | Status: DISCONTINUED | OUTPATIENT
Start: 2018-04-06 | End: 2018-04-09

## 2018-04-06 RX ORDER — ONDANSETRON 8 MG/1
4 TABLET, FILM COATED ORAL EVERY 6 HOURS
Qty: 0 | Refills: 0 | Status: DISCONTINUED | OUTPATIENT
Start: 2018-04-06 | End: 2018-04-13

## 2018-04-06 RX ORDER — DOCUSATE SODIUM 100 MG
100 CAPSULE ORAL THREE TIMES A DAY
Qty: 0 | Refills: 0 | Status: DISCONTINUED | OUTPATIENT
Start: 2018-04-06 | End: 2018-04-13

## 2018-04-06 RX ORDER — POTASSIUM CHLORIDE 20 MEQ
10 PACKET (EA) ORAL ONCE
Qty: 0 | Refills: 0 | Status: COMPLETED | OUTPATIENT
Start: 2018-04-06 | End: 2018-04-06

## 2018-04-06 RX ORDER — HYDROMORPHONE HYDROCHLORIDE 2 MG/ML
30 INJECTION INTRAMUSCULAR; INTRAVENOUS; SUBCUTANEOUS
Qty: 0 | Refills: 0 | Status: DISCONTINUED | OUTPATIENT
Start: 2018-04-06 | End: 2018-04-09

## 2018-04-06 RX ORDER — NALOXONE HYDROCHLORIDE 4 MG/.1ML
0.1 SPRAY NASAL
Qty: 0 | Refills: 0 | Status: DISCONTINUED | OUTPATIENT
Start: 2018-04-06 | End: 2018-04-13

## 2018-04-06 RX ORDER — HEPARIN SODIUM 5000 [USP'U]/ML
5000 INJECTION INTRAVENOUS; SUBCUTANEOUS EVERY 8 HOURS
Qty: 0 | Refills: 0 | Status: DISCONTINUED | OUTPATIENT
Start: 2018-04-06 | End: 2018-04-09

## 2018-04-06 RX ADMIN — HEPARIN SODIUM 5000 UNIT(S): 5000 INJECTION INTRAVENOUS; SUBCUTANEOUS at 22:00

## 2018-04-06 RX ADMIN — Medication 120 MILLIGRAM(S): at 05:46

## 2018-04-06 RX ADMIN — SODIUM CHLORIDE 75 MILLILITER(S): 9 INJECTION, SOLUTION INTRAVENOUS at 19:48

## 2018-04-06 RX ADMIN — CEFTRIAXONE 100 GRAM(S): 500 INJECTION, POWDER, FOR SOLUTION INTRAMUSCULAR; INTRAVENOUS at 05:46

## 2018-04-06 RX ADMIN — PANTOPRAZOLE SODIUM 40 MILLIGRAM(S): 20 TABLET, DELAYED RELEASE ORAL at 05:47

## 2018-04-06 RX ADMIN — Medication 10 MILLIGRAM(S): at 15:26

## 2018-04-06 RX ADMIN — Medication 100 MILLIGRAM(S): at 22:00

## 2018-04-06 RX ADMIN — Medication 2 MILLIGRAM(S): at 03:41

## 2018-04-06 RX ADMIN — Medication 100 MILLIEQUIVALENT(S): at 19:48

## 2018-04-06 RX ADMIN — Medication 100 MILLIEQUIVALENT(S): at 21:29

## 2018-04-06 RX ADMIN — Medication 15 MG/HR: at 05:47

## 2018-04-06 RX ADMIN — LOSARTAN POTASSIUM 50 MILLIGRAM(S): 100 TABLET, FILM COATED ORAL at 05:46

## 2018-04-06 NOTE — PROGRESS NOTE ADULT - SUBJECTIVE AND OBJECTIVE BOX
Subjective: Patient seen and examined. No new events except as noted.     SUBJECTIVE/ROS:  No chest pain, dyspnea, palpitation, or dizziness.       MEDICATIONS:  MEDICATIONS  (STANDING):  cefTRIAXone   IVPB 1 Gram(s) IV Intermittent every 24 hours  diltiazem    milliGRAM(s) Oral daily  diltiazem Infusion 15 mG/Hr (15 mL/Hr) IV Continuous <Continuous>  folic acid 1 milliGRAM(s) Oral daily  losartan 50 milliGRAM(s) Oral daily  multivitamin 1 Tablet(s) Oral daily  pantoprazole    Tablet 40 milliGRAM(s) Oral before breakfast  thiamine 100 milliGRAM(s) Oral daily  venlafaxine XR. 150 milliGRAM(s) Oral daily      PHYSICAL EXAM:  T(C): 36.8 (04-06-18 @ 05:45), Max: 36.9 (04-05-18 @ 15:16)  HR: 107 (04-06-18 @ 05:45) (61 - 161)  BP: 118/83 (04-06-18 @ 05:45) (107/65 - 135/86)  RR: 16 (04-06-18 @ 05:45) (16 - 18)  SpO2: 98% (04-06-18 @ 05:45) (96% - 99%)  Wt(kg): --  I&O's Summary    04 Apr 2018 07:01  -  05 Apr 2018 07:00  --------------------------------------------------------  IN: 316 mL / OUT: 0 mL / NET: 316 mL    05 Apr 2018 07:01  -  06 Apr 2018 06:57  --------------------------------------------------------  IN: 1070 mL / OUT: 0 mL / NET: 1070 mL      Height (cm): 165.1 (04-05 @ 16:15)  Weight (kg): 77.1 (04-05 @ 16:15)  BMI (kg/m2): 28.3 (04-05 @ 16:15)  BSA (m2): 1.85 (04-05 @ 16:15)    Appearance: Normal	  HEENT:   Normal oral mucosa, PERRL, EOMI	  Cardiovascular: Normal S1 S2,    Murmur:   Neck: JVP normal  Respiratory: Lungs clear to auscultation  Gastrointestinal:  Soft, Non-tender, + BS	  Skin: normal   Neuro: No gross deficits.   Psychiatry:  Mood & affect appropriate  Ext: No edema      LABS/DATA:    CARDIAC MARKERS:  CARDIAC MARKERS ( 03 Apr 2018 20:00 )  x     / < 0.06 ng/mL / 61 u/L / 2.32 ng/mL / x      CARDIAC MARKERS ( 03 Apr 2018 11:20 )  x     / < 0.06 ng/mL / x     / x     / x                                    12.9   4.57  )-----------( 195      ( 06 Apr 2018 06:00 )             39.0     04-05    140  |  106  |  9   ----------------------------<  115<H>  4.1   |  23  |  0.57    Ca    8.7      05 Apr 2018 08:18  Phos  2.5     04-05  Mg     1.7     04-05      proBNP:   Lipid Profile:   HgA1c:   TSH:     TELE:  EKG:    < from: Transthoracic Echocardiogram (04.05.18 @ 17:13) >  CONCLUSIONS:  1. Mitral annular calcification, otherwise normal mitral  valve. Mild mitral regurgitation.  2. Normal left ventricular internal dimensions and wall  thicknesses.  3. Endocardium not well visualized; grossly normal left  ventricular systolic function.  4. The right ventricle is not well visualized; grossly  normal right ventricular systolic function.    < end of copied text >

## 2018-04-06 NOTE — BRIEF OPERATIVE NOTE - OPERATION/FINDINGS
Left upper lobe nodule, unable to safely wedge out requiring lingula sparing left upper lobectomy.  During dissection, PA injured but controlled with pressure and Fibrillar, Nu-knit, and Evicel

## 2018-04-06 NOTE — BRIEF OPERATIVE NOTE - PROCEDURE
<<-----Click on this checkbox to enter Procedure Lobectomy, using VATS  04/06/2018  VATS Lingula sparing left upper lobectomy  Active  IZABEL

## 2018-04-06 NOTE — PROGRESS NOTE ADULT - SUBJECTIVE AND OBJECTIVE BOX
_________________________________________________________________________________________  ========>>  M E D I C A L   A T T E N D I N G    F O L L O W  U P  N O T E  <<=========  -----------------------------------------------------------------------------------------------------    - Patient seen and examined by me earlier today.   - In summary, patient is a 73y year old woman who originally presented with tachycardia   - Patient today  doing ok post op, comfortable, ++ mild pain due to chest tube     ==================>> REVIEW OF SYSTEM <<=================    GEN: no fever, no chills, pain as above  RESP: no SOB, no cough, no sputum  CVS: no chest pain, no palpitations, no edema  GI: no abdominal pain, no nausea, no constipation, no diarrhea  : no dysuria, no frequency, no hematuria  Neuro: no headache, no dizziness  Derm : no itching, no rash    ==================>> PHYSICAL EXAM <<=================    GEN: A&O X 3 , NAD , comfortable  HEENT: NCAT, PERRL, MMM, hearing intact  Neck: supple , no JVD  CVS: S1S2 , regular , No M/R/G appreciated  PULM: CTA B/L,  no W/R/R appreciated, chest tube in place   ABD.: soft. non tender, non distended,  bowel sounds present  Extrem: intact pulses , no edema   PSYCH : normal mood,  not anxious        ==================>> MEDICATIONS <<====================    diltiazem    Tablet 30 milliGRAM(s) Oral every 6 hours  docusate sodium 100 milliGRAM(s) Oral three times a day  heparin  Injectable 5000 Unit(s) SubCutaneous every 8 hours  HYDROmorphone PCA (1 mG/mL) 30 milliLiter(s) PCA Continuous PCA Continuous  lactated ringers. 1000 milliLiter(s) IV Continuous <Continuous>    MEDICATIONS  (PRN):  HYDROmorphone PCA (1 mG/mL) Rescue Clinician Bolus 0.5 milliGRAM(s) IV Push every 15 minutes PRN for Pain Scale GREATER THAN 6  LORazepam   Injectable 2 milliGRAM(s) IV Push every 1 hour PRN Symptom-triggered: each CIWA -Ar score 8 or GREATER  naloxone Injectable 0.1 milliGRAM(s) IV Push every 3 minutes PRN For ANY of the following changes in patient status:  A. RR LESS THAN 10 breaths per minute, B. Oxygen saturation LESS THAN 90%, C. Sedation score of 6  ondansetron Injectable 4 milliGRAM(s) IV Push every 6 hours PRN Nausea    ==================>> VITAL SIGNS <<==================    Vital Signs Last 24 Hrs  T(C): 37.1 (04-06-18 @ 16:00)  T(F): 98.7 (04-06-18 @ 16:00), Max: 98.7 (04-06-18 @ 16:00)  HR: 60 (04-06-18 @ 18:00) (56 - 161)  BP: 91/52 (04-06-18 @ 14:30)  BP(mean): 62 (04-06-18 @ 14:30) (62 - 62)  RR: 24 (04-06-18 @ 18:00) (15 - 32)  SpO2: 100% (04-06-18 @ 18:00) (95% - 100%)       ==================>> LAB AND IMAGING <<==================                        12.2   10.65 )-----------( 179      ( 06 Apr 2018 14:45 )             36.5     WBC count:   10.65 <<== ,  4.57 <<== ,  4.54 <<== ,  4.27 <<== ,  7.40 <<== ,  7.97 <<==     Hemoglobin:   12.2 <<==,  12.9 <<==,  12.0 <<==,  12.7 <<==,  13.1 <<==,  13.9 <<==      139  |  102  |  8   ----------------------------<  192<H>  3.2<L>   |  22  |  0.46<L>    Sodium:   139  <==, 140  <==, 140  <==, 140  <==, 142  <==, 140  <==, 143  <==    Ca    8.1<L>      06 Apr 2018 14:45  Phos  2.5     04-05  Mg     1.7     04-05    _______________________  C U L T U R E S :    Culture - Urine (collected 04 Apr 2018 04:17)  Source: URINE MIDSTREAM  Final Report (06 Apr 2018 09:49):    COLONY COUNT: > = 100,000 CFU/ML  Organism: Klebsiella pneumoniae (06 Apr 2018 09:49)  Organism: Klebsiella pneumoniae (06 Apr 2018 09:49)    Sensitivities:      -  Amikacin: S <=16 CALVIN      -  Ampicillin: R >16 CALVIN      -  Ampicillin/Sulbactam: R >16/8 CALVIN      -  Aztreonam: S <=4 CALVIN      -  Cefazolin: S <=8 CALVIN      -  Cefepime: S <=4 CALVIN      -  Cefoxitin: S <=8 CALVIN      -  Ceftazidime: S <=1 CALVIN      -  Ceftriaxone: S <=1 CALVIN      -  Ciprofloxacin: S <=1 CALVIN      -  Ertapenem: S <=1 CALVIN      -  Gentamicin: S <=4 CALVIN      -  Imipenem: S <=1 CALVIN      -  Levofloxacin: S <=2 CALVIN      -  Meropenem: S <=1 CALVIN      -  Nitrofurantoin: S <=32 CALVIN      -  Piperacillin/Tazobactam: S <=16 CALVIN      -  Tigecycline: S <=2 CALVIN      -  Tobramycin: S <=4 CALVIN      -  Trimethoprim/Sulfamethoxazole: R >2/38 CALVIN      Method Type: MICROSCAN NEG URINE COMBO 61    < from: Transthoracic Echocardiogram (04.05.18 @ 17:13) >  CONCLUSIONS:  1. Mitral annular calcification, otherwise normal mitral valve. Mild mitral regurgitation.  2. Normal left ventricular internal dimensions and wall thicknesses.  3. Endocardium not well visualized; grossly normal left ventricular systolic function.  4. The right ventricle is not well visualized; grossly normal right ventricular systolic function.  < end of copied text >    ___________________________________________________________________________________  ===============>>  A S S E S S M E N T   A N D   P L A N <<===============  ------------------------------------------------------------------------------------------    · Assessment		      74 yo F PMHx Afib with multiple ablations in the past at [facility] (4 years ago and in Nov 2017) and cardioversion (Dec 2017) sent from pre-surgical testing for palpitations, found to be in Rapid Afib @ 167bpm, admitted to [facility] for Rapid Afib, r/o ACS and Alcohol Withdrawal.  +Rapid Afib  +Alcohol Withdrawal      Problem/Plan - 1:  ·  Problem: Atrial fibrillation with RVR, overall improved  tele monitor   AC as able post op   EP and cardio f/u  Cardizem as ordered  encourage PO hydration.     Problem/Plan - 2:  ·  Problem: Alcohol withdrawal, overall improved   CIWA protocol as above: PRN only   Monitor VS as per routine  continue Venlafaxine for depression history     Problem/Plan - 3:  ·  Problem: UTI  post 3 doses of Rocephinas is sensitive  observing off abx    Problem/Plan - 4:  ·  Problem: Lung mass / nodule  doing well post surgical resection   close telemetry monitoring Emily-Op   mgmt of chest tube per surgical team    -GI/DVT Prophylaxis.  - DC gilles as able  - PT, OOb as able    --------------------------------------------  Case discussed with pt, staff  Education given on findings and plan of care  ___________________________  H. MAKI Silva.  Pager: [phone]

## 2018-04-06 NOTE — PROGRESS NOTE ADULT - ASSESSMENT
afib with RVR  PAF  cont cardizem infusion   a/c   lovenox for now  echo shows normal LV function   fu with EP    etoh withdrawal  ciwa protocol  stable     lung mass  plan for resection  HR stable   no objection to proceed to OR as planned

## 2018-04-06 NOTE — PROGRESS NOTE ADULT - SUBJECTIVE AND OBJECTIVE BOX
SANTIAGO MAURICIO                     MRN-[id number]    HPI:  72 yo F PMHx Afib with multiple ablations in the past at [facility] (4 years ago and in Nov 2017) and cardioversion (Dec 2017) sent from pre-surgical testing for rapid afib and palpitations.   Pt recently found to have a long nodule that is scheduled for left VAT, left upper lobe wedge resection on Friday (3/6/18). At pre-surgical testing today, she felt very anxious and palpitations, described as "heart racing." Upon checking her heart rate she was @ 160's bpm and was sent to [facility] Ed for evaluation. Pt denies chest pain, sob, diaphoresis, nausea, vomiting or abdominal pain.     In ED she found to have Rapid Afib @ 167bpm and was given multiple doses of IV Cardizem with partial rate control.   On admission: she in Afib @ 114 bpm. Pt admits to drinking 3-4 glasses of wine daily. However, last intake of alcohol was on Easter Sunday (2 days ago) where she drank 1 bottle of wine. (03 Apr 2018 14:05)      Procedure: 04/06/2018  VATS Lingula sparing left upper lobectomy  POD # : 0    Issues:  Lung nodule  post op pain  A fib/flutter  Alcohol abuse  Hypertension  Dyslipidemia  Depression      PAST MEDICAL & SURGICAL HISTORY:  Alcohol abuse  Hypertension  Dyslipidemia  Depression  Atrial fibrillation  Arthritis  Anxiety  History of cardioversion: 12/2017  H/O prior ablation treatment: cardiac 11/2017            VITAL SIGNS:  Vital Signs Last 24 Hrs  T(C): 36.5 (06 Apr 2018 14:30), Max: 36.9 (05 Apr 2018 16:40)  T(F): 97.7 (06 Apr 2018 14:30), Max: 98.5 (05 Apr 2018 16:40)  HR: 59 (06 Apr 2018 15:00) (58 - 161)  BP: 91/52 (06 Apr 2018 14:30) (91/52 - 135/86)  BP(mean): 62 (06 Apr 2018 14:30) (62 - 62)  RR: 25 (06 Apr 2018 15:00) (15 - 32)  SpO2: 100% (06 Apr 2018 15:00) (96% - 100%)    I/Os:   I&O's Detail    05 Apr 2018 07:01  -  06 Apr 2018 07:00  --------------------------------------------------------  IN:    diltiazem Infusion: 60 mL    diltiazem Infusion: 70 mL    Oral Fluid: 940 mL  Total IN: 1070 mL    OUT:  Total OUT: 0 mL    Total NET: 1070 mL          CAPILLARY BLOOD GLUCOSE          =======================MEDICATIONS===================  MEDICATIONS  (STANDING):  docusate sodium 100 milliGRAM(s) Oral three times a day  heparin  Injectable 5000 Unit(s) SubCutaneous every 8 hours  HYDROmorphone PCA (1 mG/mL) 30 milliLiter(s) PCA Continuous PCA Continuous  lactated ringers. 1000 milliLiter(s) (75 mL/Hr) IV Continuous <Continuous>    MEDICATIONS  (PRN):  HYDROmorphone PCA (1 mG/mL) Rescue Clinician Bolus 0.5 milliGRAM(s) IV Push every 15 minutes PRN for Pain Scale GREATER THAN 6  naloxone Injectable 0.1 milliGRAM(s) IV Push every 3 minutes PRN For ANY of the following changes in patient status:  A. RR LESS THAN 10 breaths per minute, B. Oxygen saturation LESS THAN 90%, C. Sedation score of 6  ondansetron Injectable 4 milliGRAM(s) IV Push every 6 hours PRN Nausea    =============PHYSICAL EXAM============================  General:                         Awake, alert, not in any distress  Neuro:                            Nonfocal  Respiratory:	Air entry fair and  bilateral conducted sounds                                          Chest tube to suction  CV:		Regular rate and rhythm. Normal S1/S2                                          Distal pulses present.  Abdomen:	                     Soft, non-distended. Bowel sounds present   Skin:		No rash.  Extremities:	Warm, no cyanosis or edema.  Palpable pulses    ============================LABS=========================                        12.2   10.65 )-----------( 179      ( 06 Apr 2018 14:45 )             36.5     04-06    139  |  102  |  8   ----------------------------<  192<H>  3.2<L>   |  22  |  0.46<L>    Ca    8.1<L>      06 Apr 2018 14:45  Phos  2.5     04-05  Mg     1.7     04-05        PT/INR - ( 06 Apr 2018 14:45 )   PT: 11.3 SEC;   INR: 1.02          PTT - ( 06 Apr 2018 14:45 )  PTT:23.1 SEC  ABG - ( 06 Apr 2018 14:30 )  pH: 7.34  /  pCO2: 42    /  pO2: 159   / HCO3: 22    / Base Excess: -2.8  /  SaO2: 99.3            =============================NEUROLOGY============================  Pain control with PCA /  Tylenol IV     ==============================RESPIRATORY========================  Pt is on  2 L nasal canula   Comfortable, not in any distress.  Using incentive spirometry   Monitor chest tube output  Chest tube to suction 	  Continue bronchodilators, pulmonary toilet    ============================CARDIOVASCULAR======================  Continue hemodynamic monitoring.  Not on any pressors    =====================RENAL===================  Continue LR 30CC/hr    Monitor I/Os and electrolytes    ====================GASTROINTESTINAL===================  On clears, tolerating  Continue GI prophylaxis with Protonix  Continue Zofran / Reglan for nausea - PRN	    ========================HEMATOLOGIC/ONCOLOGIC====================  Monitor chest tube output. No signs of active bleeding.   Follow CBC in AM    ============================INFECTIOUS DISEASE========================  Monitor for fever / leukocytosis.  All surgical incision / chest tube  sites look clean      Pt is on GI & DVT prophylaxis  OOB & ambulate       Pertinent clinical, laboratory, radiographic, hemodynamic, echocardiographic, respiratory data, microbiologic data and chart were reviewed and analyzed frequently throughout the course of the day and night  Patient seen, examined and plan discussed with CT Surgery / CTICU team during rounds.    Pt's status discussed with family at bedside, updated status        Nola Conn DO FACEP

## 2018-04-07 LAB
APTT BLD: 23.6 SEC — LOW (ref 27.5–37.4)
BASE EXCESS BLDA CALC-SCNC: 0.6 MMOL/L — SIGNIFICANT CHANGE UP
BUN SERPL-MCNC: 8 MG/DL — SIGNIFICANT CHANGE UP (ref 7–23)
CA-I BLD-SCNC: 1.04 MMOL/L — SIGNIFICANT CHANGE UP (ref 1.03–1.23)
CALCIUM SERPL-MCNC: 7.8 MG/DL — LOW (ref 8.4–10.5)
CHLORIDE SERPL-SCNC: 105 MMOL/L — SIGNIFICANT CHANGE UP (ref 98–107)
CO2 SERPL-SCNC: 23 MMOL/L — SIGNIFICANT CHANGE UP (ref 22–31)
CREAT SERPL-MCNC: 0.44 MG/DL — LOW (ref 0.5–1.3)
GLUCOSE BLDA-MCNC: 135 MG/DL — HIGH (ref 70–99)
GLUCOSE SERPL-MCNC: 135 MG/DL — HIGH (ref 70–99)
HCO3 BLDA-SCNC: 25 MMOL/L — SIGNIFICANT CHANGE UP (ref 22–26)
HCT VFR BLD CALC: 33.7 % — LOW (ref 34.5–45)
HCT VFR BLDA CALC: 35.2 % — SIGNIFICANT CHANGE UP (ref 34.5–46.5)
HGB BLD-MCNC: 11.2 G/DL — LOW (ref 11.5–15.5)
HGB BLDA-MCNC: 11.4 G/DL — LOW (ref 11.5–15.5)
INR BLD: 0.98 — SIGNIFICANT CHANGE UP (ref 0.88–1.17)
MAGNESIUM SERPL-MCNC: 1.5 MG/DL — LOW (ref 1.6–2.6)
MCHC RBC-ENTMCNC: 33.2 % — SIGNIFICANT CHANGE UP (ref 32–36)
MCHC RBC-ENTMCNC: 33.7 PG — SIGNIFICANT CHANGE UP (ref 27–34)
MCV RBC AUTO: 101.5 FL — HIGH (ref 80–100)
NRBC # FLD: 0 — SIGNIFICANT CHANGE UP
PCO2 BLDA: 40 MMHG — SIGNIFICANT CHANGE UP (ref 32–48)
PH BLDA: 7.4 PH — SIGNIFICANT CHANGE UP (ref 7.35–7.45)
PHOSPHATE SERPL-MCNC: 3 MG/DL — SIGNIFICANT CHANGE UP (ref 2.5–4.5)
PLATELET # BLD AUTO: 168 K/UL — SIGNIFICANT CHANGE UP (ref 150–400)
PMV BLD: 10.4 FL — SIGNIFICANT CHANGE UP (ref 7–13)
PO2 BLDA: 103 MMHG — SIGNIFICANT CHANGE UP (ref 83–108)
POTASSIUM BLDA-SCNC: 4.1 MMOL/L — SIGNIFICANT CHANGE UP (ref 3.4–4.5)
POTASSIUM SERPL-MCNC: 4.2 MMOL/L — SIGNIFICANT CHANGE UP (ref 3.5–5.3)
POTASSIUM SERPL-SCNC: 4.2 MMOL/L — SIGNIFICANT CHANGE UP (ref 3.5–5.3)
PROTHROM AB SERPL-ACNC: 10.9 SEC — SIGNIFICANT CHANGE UP (ref 9.8–13.1)
RBC # BLD: 3.32 M/UL — LOW (ref 3.8–5.2)
RBC # FLD: 13.2 % — SIGNIFICANT CHANGE UP (ref 10.3–14.5)
SAO2 % BLDA: 98.1 % — SIGNIFICANT CHANGE UP (ref 95–99)
SODIUM BLDA-SCNC: 133 MMOL/L — LOW (ref 136–146)
SODIUM SERPL-SCNC: 139 MMOL/L — SIGNIFICANT CHANGE UP (ref 135–145)
WBC # BLD: 9.95 K/UL — SIGNIFICANT CHANGE UP (ref 3.8–10.5)
WBC # FLD AUTO: 9.95 K/UL — SIGNIFICANT CHANGE UP (ref 3.8–10.5)

## 2018-04-07 PROCEDURE — 71045 X-RAY EXAM CHEST 1 VIEW: CPT | Mod: 26

## 2018-04-07 PROCEDURE — 99233 SBSQ HOSP IP/OBS HIGH 50: CPT

## 2018-04-07 RX ORDER — VENLAFAXINE HCL 75 MG
150 CAPSULE, EXT RELEASE 24 HR ORAL DAILY
Qty: 0 | Refills: 0 | Status: DISCONTINUED | OUTPATIENT
Start: 2018-04-07 | End: 2018-04-13

## 2018-04-07 RX ORDER — MAGNESIUM SULFATE 500 MG/ML
2 VIAL (ML) INJECTION ONCE
Qty: 0 | Refills: 0 | Status: COMPLETED | OUTPATIENT
Start: 2018-04-07 | End: 2018-04-07

## 2018-04-07 RX ORDER — ALPRAZOLAM 0.25 MG
1 TABLET ORAL THREE TIMES A DAY
Qty: 0 | Refills: 0 | Status: DISCONTINUED | OUTPATIENT
Start: 2018-04-07 | End: 2018-04-11

## 2018-04-07 RX ORDER — FUROSEMIDE 40 MG
10 TABLET ORAL ONCE
Qty: 0 | Refills: 0 | Status: COMPLETED | OUTPATIENT
Start: 2018-04-07 | End: 2018-04-07

## 2018-04-07 RX ADMIN — Medication 1 MILLIGRAM(S): at 09:52

## 2018-04-07 RX ADMIN — HYDROMORPHONE HYDROCHLORIDE 30 MILLILITER(S): 2 INJECTION INTRAMUSCULAR; INTRAVENOUS; SUBCUTANEOUS at 07:05

## 2018-04-07 RX ADMIN — HEPARIN SODIUM 5000 UNIT(S): 5000 INJECTION INTRAVENOUS; SUBCUTANEOUS at 22:58

## 2018-04-07 RX ADMIN — Medication 10 MILLIGRAM(S): at 09:52

## 2018-04-07 RX ADMIN — HYDROMORPHONE HYDROCHLORIDE 30 MILLILITER(S): 2 INJECTION INTRAMUSCULAR; INTRAVENOUS; SUBCUTANEOUS at 19:00

## 2018-04-07 RX ADMIN — Medication 1 MILLIGRAM(S): at 18:18

## 2018-04-07 RX ADMIN — SODIUM CHLORIDE 75 MILLILITER(S): 9 INJECTION, SOLUTION INTRAVENOUS at 07:04

## 2018-04-07 RX ADMIN — Medication 100 MILLIGRAM(S): at 14:00

## 2018-04-07 RX ADMIN — SODIUM CHLORIDE 30 MILLILITER(S): 9 INJECTION, SOLUTION INTRAVENOUS at 12:41

## 2018-04-07 RX ADMIN — Medication 50 GRAM(S): at 07:47

## 2018-04-07 RX ADMIN — HEPARIN SODIUM 5000 UNIT(S): 5000 INJECTION INTRAVENOUS; SUBCUTANEOUS at 05:40

## 2018-04-07 RX ADMIN — HEPARIN SODIUM 5000 UNIT(S): 5000 INJECTION INTRAVENOUS; SUBCUTANEOUS at 14:00

## 2018-04-07 RX ADMIN — Medication 100 MILLIGRAM(S): at 05:40

## 2018-04-07 RX ADMIN — SODIUM CHLORIDE 30 MILLILITER(S): 9 INJECTION, SOLUTION INTRAVENOUS at 19:01

## 2018-04-07 RX ADMIN — Medication 150 MILLIGRAM(S): at 13:59

## 2018-04-07 RX ADMIN — Medication 100 MILLIGRAM(S): at 22:58

## 2018-04-07 NOTE — PROGRESS NOTE ADULT - ASSESSMENT
afib with RVR  stable now in sinus  cont po cardizem   resume a/c once ok with cts    etoh withdrawal  stable

## 2018-04-07 NOTE — PROGRESS NOTE ADULT - SUBJECTIVE AND OBJECTIVE BOX
_________________________________________________________________________________________  ========>>  M E D I C A L   A T T E N D I N G    F O L L O W  U P  N O T E  <<=========  -----------------------------------------------------------------------------------------------------    - Patient seen and examined by me 4/7/2018 (not not saved??).   - In summary, patient is a 73y year old woman who originally presented with tachycardia   - Patient doing ok post op, comfortable, ++ mild pain due to chest tube but OOB to chair and on liquid diet    ==================>> REVIEW OF SYSTEM <<=================    GEN: no fever, no chills, pain as above  RESP: no SOB, no cough, no sputum  CVS: no chest pain, no palpitations, no edema  GI: no abdominal pain, no nausea, no constipation, no diarrhea  : no dysuria, no frequency, no hematuria  Neuro: no headache, no dizziness  Derm : no itching, no rash    ==================>> PHYSICAL EXAM <<=================    GEN: A&O X 3 , NAD , comfortable in recliner   HEENT: NCAT, PERRL, MMM, hearing intact  Neck: supple , no JVD  CVS: S1S2 , regular , No M/R/G appreciated  PULM: CTA B/L,  no W/R/R appreciated, chest tube in place   ABD.: soft. non tender, non distended,  bowel sounds present  Extrem: intact pulses , no edema   PSYCH : normal mood,  not anxious        ==================>> MEDICATIONS <<====================    diltiazem    Tablet 30 milliGRAM(s) Oral every 6 hours  docusate sodium 100 milliGRAM(s) Oral three times a day  heparin  Injectable 5000 Unit(s) SubCutaneous every 8 hours  HYDROmorphone PCA (1 mG/mL) 30 milliLiter(s) PCA Continuous PCA Continuous  lactated ringers. 1000 milliLiter(s) IV Continuous <Continuous>    MEDICATIONS  (PRN):  HYDROmorphone PCA (1 mG/mL) Rescue Clinician Bolus 0.5 milliGRAM(s) IV Push every 15 minutes PRN for Pain Scale GREATER THAN 6  LORazepam   Injectable 2 milliGRAM(s) IV Push every 1 hour PRN Symptom-triggered: each CIWA -Ar score 8 or GREATER  naloxone Injectable 0.1 milliGRAM(s) IV Push every 3 minutes PRN For ANY of the following changes in patient status:  A. RR LESS THAN 10 breaths per minute, B. Oxygen saturation LESS THAN 90%, C. Sedation score of 6  ondansetron Injectable 4 milliGRAM(s) IV Push every 6 hours PRN Nausea    ==================>> VITAL SIGNS <<==================    Vital Signs reviewed in EMR 4/7/2018       ==================>> LAB AND IMAGING <<==================             labs of 4/7/2018 reviewed in EMR)               12.2   10.65 )-----------( 179      ( 06 Apr 2018 14:45 )             36.5     139  |  102  |  8   ----------------------------<  192<H>  3.2<L>   |  22  |  0.46<L>    Ca    8.1<L>      06 Apr 2018 14:45  Phos  2.5     04-05  Mg     1.7     04-05    ___________________________________________________________________________________  ===============>>  A S S E S S M E N T   A N D   P L A N <<===============  ------------------------------------------------------------------------------------------    · Assessment		      74 yo F PMHx Afib with multiple ablations in the past at [facility] (4 years ago and in Nov 2017) and cardioversion (Dec 2017) sent from pre-surgical testing for palpitations, found to be in Rapid Afib @ 167bpm, admitted to tele for Rapid Afib, r/o ACS and Alcohol Withdrawal.  +Rapid Afib  +Alcohol Withdrawal  + lung mass    Problem/Plan - 1:  ·  Problem: Atrial fibrillation with RVR, overall improved  tele monitor   AC as able post op   EP and cardio f/u  Cardizem as ordered  encourage PO hydration.     Problem/Plan - 2:  ·  Problem: Alcohol withdrawal, overall improved   CIWA protocol as above: PRN only   Monitor VS as per routine  continue Venlafaxine for depression history     Problem/Plan - 3:  ·  Problem: UTI  observing off abx, post Rx    Problem/Plan - 4:  ·  Problem: Lung mass / nodule  doing well post surgical resection   close telemetry monitoring Emily-Op   mgmt of chest tube per surgical team  follow pathology    -GI/DVT Prophylaxis.  - PT, OOB    --------------------------------------------  Case discussed with pt, Rn  Education given on findings and plan of care  ___________________________  H. MAKI Silva.  Pager: [phone]

## 2018-04-07 NOTE — PROGRESS NOTE ADULT - SUBJECTIVE AND OBJECTIVE BOX
POST ANESTHESIA EVALUATION    73y Female POSTOP DAY 1 S/P     MENTAL STATUS: Patient participation [ x ] Awake     [  ] Arousable     [  ] Sedated    AIRWAY PATENCY: [  x] Satisfactory  [  ] Other:     Vital Signs Last 24 Hrs  T(C): 37.1 (07 Apr 2018 08:00), Max: 37.1 (06 Apr 2018 16:00)  T(F): 98.7 (07 Apr 2018 08:00), Max: 98.8 (06 Apr 2018 20:00)  HR: 71 (07 Apr 2018 07:00) (56 - 110)  BP: 98/60 (07 Apr 2018 07:00) (91/52 - 130/79)  BP(mean): 68 (07 Apr 2018 07:00) (62 - 73)  RR: 26 (07 Apr 2018 07:00) (15 - 33)  SpO2: 98% (07 Apr 2018 07:00) (95% - 100%)  I&O's Summary    06 Apr 2018 07:01  -  07 Apr 2018 07:00  --------------------------------------------------------  IN: 1475 mL / OUT: 795 mL / NET: 680 mL    07 Apr 2018 07:01  -  07 Apr 2018 08:20  --------------------------------------------------------  IN: 75 mL / OUT: 10 mL / NET: 65 mL          NAUSEA/ VOMITTING:  [x  ] NONE  [  ] CONTROLLED [  ] OTHER     PAIN: [x  ] CONTROLLED WITH CURRENT REGIMEN  [  ] OTHER    [x  ] NO APPARENT ANESTHESIA COMPLICATIONS      Comments:

## 2018-04-07 NOTE — PROGRESS NOTE ADULT - SUBJECTIVE AND OBJECTIVE BOX
Subjective: Patient seen and examined. No new events except as noted.     SUBJECTIVE/ROS:  s/p surgery  in cticu  sitting in chair  feels comfortable       MEDICATIONS:  MEDICATIONS  (STANDING):  diltiazem    Tablet 30 milliGRAM(s) Oral every 6 hours  docusate sodium 100 milliGRAM(s) Oral three times a day  heparin  Injectable 5000 Unit(s) SubCutaneous every 8 hours  HYDROmorphone PCA (1 mG/mL) 30 milliLiter(s) PCA Continuous PCA Continuous  lactated ringers. 1000 milliLiter(s) (75 mL/Hr) IV Continuous <Continuous>  magnesium sulfate  IVPB 2 Gram(s) IV Intermittent once      PHYSICAL EXAM:  T(C): 36.8 (04-07-18 @ 04:00), Max: 37.1 (04-06-18 @ 16:00)  HR: 71 (04-07-18 @ 07:00) (56 - 110)  BP: 98/60 (04-07-18 @ 07:00) (91/52 - 130/79)  RR: 26 (04-07-18 @ 07:00) (15 - 33)  SpO2: 98% (04-07-18 @ 07:00) (95% - 100%)  Wt(kg): --  I&O's Summary    06 Apr 2018 07:01  -  07 Apr 2018 07:00  --------------------------------------------------------  IN: 1475 mL / OUT: 795 mL / NET: 680 mL      Height (cm): 165.1 (04-06 @ 08:08)  Weight (kg): 77.1 (04-06 @ 08:08)  BMI (kg/m2): 28.3 (04-06 @ 08:08)  BSA (m2): 1.85 (04-06 @ 08:08)    JVP: Normal  Neck: supple  Lung: clear   CV: S1 S2 , Murmur:  Abd: soft  Ext: No edema  neuro: Awake / alert  Psych: flat affect  Skin: normal       LABS/DATA:    CARDIAC MARKERS:                                11.2   9.95  )-----------( 168      ( 07 Apr 2018 04:00 )             33.7     04-07    139  |  105  |  8   ----------------------------<  135<H>  4.2   |  23  |  0.44<L>    Ca    7.8<L>      07 Apr 2018 04:00  Phos  3.0     04-07  Mg     1.5     04-07      proBNP:   Lipid Profile:   HgA1c:   TSH:     TELE:  EKG:

## 2018-04-07 NOTE — PROGRESS NOTE ADULT - SUBJECTIVE AND OBJECTIVE BOX
SANTIAGO MAURICIO  MRN-[id number]    HPI:  74 yo F PMHx Afib with multiple ablations in the past at [facility] (4 years ago and in Nov 2017) and cardioversion (Dec 2017) sent from pre-surgical testing for rapid afib and palpitations.   Pt recently found to have a long nodule that is scheduled for left VAT, left upper lobe wedge resection on Friday (3/6/18). At pre-surgical testing today, she felt very anxious and palpitations, described as "heart racing." Upon checking her heart rate she was @ 160's bpm and was sent to [facility] Ed for evaluation. Pt denies chest pain, sob, diaphoresis, nausea, vomiting or abdominal pain.     In ED she found to have Rapid Afib @ 167bpm and was given multiple doses of IV Cardizem with partial rate control.   On admission: she in Afib @ 114 bpm. Pt admits to drinking 3-4 glasses of wine daily. However, last intake of alcohol was on Easter Sunday (2 days ago) where she drank 1 bottle of wine. (03 Apr 2018 14:05)      Procedure: 04/06/2018  VATS Lingula sparing left upper lobectomy  POD # :     Issues:  Lung nodule  post op pain  A fib/flutter  Alcohol abuse  Hypertension  Dyslipidemia  Depression        PAST MEDICAL & SURGICAL HISTORY:  Alcohol abuse  Hypertension  Dyslipidemia  Depression  Atrial fibrillation  Arthritis  Anxiety  History of cardioversion: 12/2017  H/O prior ablation treatment: cardiac 11/2017      ***VITAL SIGNS:  Vital Signs Last 24 Hrs  T(C): 36.8 (07 Apr 2018 04:00), Max: 37.1 (06 Apr 2018 16:00)  T(F): 98.2 (07 Apr 2018 04:00), Max: 98.8 (06 Apr 2018 20:00)  HR: 76 (07 Apr 2018 06:00) (56 - 110)  BP: 110/60 (06 Apr 2018 20:00) (91/52 - 130/79)  BP(mean): 73 (06 Apr 2018 20:00) (62 - 73)  RR: 26 (07 Apr 2018 06:00) (15 - 33)  SpO2: 99% (07 Apr 2018 06:00) (95% - 100%)  I/Os:   I&O's Detail    06 Apr 2018 07:01  -  07 Apr 2018 07:00  --------------------------------------------------------  IN:    IV PiggyBack: 200 mL    lactated ringers.: 1275 mL  Total IN: 1475 mL    OUT:    Chest Tube: 130 mL    Indwelling Catheter - Urethral: 605 mL  Total OUT: 735 mL    Total NET: 740 mL        CAPILLARY BLOOD GLUCOSE          ======================= PHYSICAL EXAM============================  General:                         Awake, alert, not in any distress, in chair  Neuro:                            Moving all extremities to commands. No acute change from baseline exam.	  Respiratory:	Lungs clear to auscultation bilaterally. Good aeration.                                         Chest tube to suction.  CV:		Regular rate and rhythm. Normal S1/S2. No murmurs                                          Distal pulses present.  Abdomen:	                     Soft, non-distended. Bowel sounds present   Skin:		No rash.  Extremities:	Warm, no cyanosis or edema.  Palpable pulses    ============================ LABS =========================                        11.2   9.95  )-----------( 168      ( 07 Apr 2018 04:00 )             33.7     04-07    139  |  105  |  8   ----------------------------<  135<H>  4.2   |  23  |  0.44<L>    Ca    7.8<L>      07 Apr 2018 04:00  Phos  3.0     04-07  Mg     1.5     04-07        PT/INR - ( 07 Apr 2018 04:00 )   PT: 10.9 SEC;   INR: 0.98          PTT - ( 07 Apr 2018 04:00 )  PTT:23.6 SEC  ABG - ( 07 Apr 2018 04:00 )  pH: 7.40  /  pCO2: 40    /  pO2: 103   / HCO3: 25    / Base Excess: 0.6   /  SaO2: 98.1            ===================== IMAGING STUDIES =========================  < from: CT Chest No Cont (04.05.18 @ 10:51) >  AIRWAYS ANDLUNGS: The central tracheobronchial tree is patent.  Left   upper lobe part solid 1.8 cm nodule is unchanged from the prior study.   Subtle small lobe groundglass nodules measure up to 3 mm.    MEDIASTINUM AND PLEURA: There are no enlarged mediastinal, hilar or   axillary lymph nodes. The visualized portion of the thyroid gland is   unremarkable. There is no pleural effusion. There is no pneumothorax.      HEART AND VESSELS: There is cardiomegaly.  There are atherosclerotic   calcifications of the aorta.  There is no pericardial effusion.         UPPER ABDOMEN: Images of the upper abdomen demonstrate no abnormality.     BONES AND SOFT TISSUES: The bones are unremarkable.  The soft tissues are   unremarkable.    TUBES/LINES: None.    IMPRESSION:   Left upper lobe part solid nodule unchanged from the prior study is   suspicious for malignancy.        =======================  MEDICATIONS  =================  MEDICATIONS  (STANDING):  diltiazem    Tablet 30 milliGRAM(s) Oral every 6 hours  docusate sodium 100 milliGRAM(s) Oral three times a day  heparin  Injectable 5000 Unit(s) SubCutaneous every 8 hours  HYDROmorphone PCA (1 mG/mL) 30 milliLiter(s) PCA Continuous PCA Continuous  lactated ringers. 1000 milliLiter(s) (75 mL/Hr) IV Continuous <Continuous>  magnesium sulfate  IVPB 2 Gram(s) IV Intermittent once    MEDICATIONS  (PRN):  HYDROmorphone PCA (1 mG/mL) Rescue Clinician Bolus 0.5 milliGRAM(s) IV Push every 15 minutes PRN for Pain Scale GREATER THAN 6  LORazepam   Injectable 2 milliGRAM(s) IV Push every 1 hour PRN Symptom-triggered: each CIWA -Ar score 8 or GREATER  naloxone Injectable 0.1 milliGRAM(s) IV Push every 3 minutes PRN For ANY of the following changes in patient status:  A. RR LESS THAN 10 breaths per minute, B. Oxygen saturation LESS THAN 90%, C. Sedation score of 6  ondansetron Injectable 4 milliGRAM(s) IV Push every 6 hours PRN Nausea      ====================== NEUROLOGY=====================  Pain control with PCA / Tylenol IV   ==================== RESPIRATORY======================  Pt is on 2 L nasal canula   Comfortable, not in any distress.  Using incentive spirometry  Monitor chest tube output  Chest tube to suction   Continue bronchodilators, pulmonary toilet    ====================CARDIOVASCULAR==================  Continue hemodynamic monitoring.  Not on any pressors      ===================== RENAL =========================  Continue LR  IVF  Monitor I/Os and electrolytes      ==================== GASTROINTESTINAL===================  On regular diet, tolerating  Continue GI prophylaxis with Pepcid / Protonix  Continue Zofran / Reglan for nausea - PRN	      =======================    ENDOCRIN  =====================  Glycemic monitoring  F/S with coverage  ===================HEMATOLOGIC/ONC ===================  Monitor chest tube output. No signs of active bleeding.   Follow CBC in AM  DVT prophylaxis with SCD, sc Heparin    ========================INFECTIOUS DISEASE================  No signs of infection. Monitor for fever / leukocytosis.  All surgical incision / chest tube  sites look clean  D/C Hannon      Pertinent clinical, laboratory, radiographic, hemodynamic, echocardiographic, respiratory data, microbiologic data and chart were reviewed and analyzed frequently throughout the course of the day and night. GI and DVT prophylaxis, glycemic control, head of bed elevation and skin care issues were addressed.  Patient seen, examined and plan discussed with CT Surgery / CTICU team during rounds.          Qiuping Fabien HALL FACEP

## 2018-04-07 NOTE — PROGRESS NOTE ADULT - SUBJECTIVE AND OBJECTIVE BOX
Anesthesia Pain Management Service    SUBJECTIVE: Patient is doing well with IV PCA and no significant problems reported.    Pain Scale Score	At rest: ___ 	With Activity: ___ 	[X ] Refer to charted pain scores    THERAPY:    [ ] IV PCA Morphine		[ ] 5 mg/mL	[ ] 1 mg/mL  [X ] IV PCA Hydromorphone	[ ] 5 mg/mL	[X ] 1 mg/mL  [ ] IV PCA Fentanyl		[ ] 50 micrograms/mL    Demand dose __0.2_ lockout __6_ (minutes) Continuous Rate _0__ Total: 1.8mg___  Daily      MEDICATIONS  (STANDING):  diltiazem    Tablet 30 milliGRAM(s) Oral every 6 hours  docusate sodium 100 milliGRAM(s) Oral three times a day  heparin  Injectable 5000 Unit(s) SubCutaneous every 8 hours  HYDROmorphone PCA (1 mG/mL) 30 milliLiter(s) PCA Continuous PCA Continuous  lactated ringers. 1000 milliLiter(s) (75 mL/Hr) IV Continuous <Continuous>    MEDICATIONS  (PRN):  HYDROmorphone PCA (1 mG/mL) Rescue Clinician Bolus 0.5 milliGRAM(s) IV Push every 15 minutes PRN for Pain Scale GREATER THAN 6  LORazepam   Injectable 2 milliGRAM(s) IV Push every 1 hour PRN Symptom-triggered: each CIWA -Ar score 8 or GREATER  naloxone Injectable 0.1 milliGRAM(s) IV Push every 3 minutes PRN For ANY of the following changes in patient status:  A. RR LESS THAN 10 breaths per minute, B. Oxygen saturation LESS THAN 90%, C. Sedation score of 6  ondansetron Injectable 4 milliGRAM(s) IV Push every 6 hours PRN Nausea      OBJECTIVE:    Sedation Score:	[ X] Alert	[ ] Drowsy 	[ ] Arousable	[ ] Asleep	[ ] Unresponsive    Side Effects:	[X ] None	[ ] Nausea	[ ] Vomiting	[ ] Pruritus  		[ ] Other:    Vital Signs Last 24 Hrs  T(C): 37.1 (07 Apr 2018 08:00), Max: 37.1 (06 Apr 2018 16:00)  T(F): 98.7 (07 Apr 2018 08:00), Max: 98.8 (06 Apr 2018 20:00)  HR: 71 (07 Apr 2018 07:00) (56 - 110)  BP: 98/60 (07 Apr 2018 07:00) (91/52 - 130/79)  BP(mean): 68 (07 Apr 2018 07:00) (62 - 73)  RR: 26 (07 Apr 2018 07:00) (15 - 33)  SpO2: 98% (07 Apr 2018 07:00) (95% - 100%)    ASSESSMENT/ PLAN    Therapy to  be:	[ X] Continue   [ ] Discontinued   [ ] Change to prn Analgesics    Documentation and Verification of current medications:   [X] Done	[ ] Not done, not elligible    Comments:

## 2018-04-08 LAB
BUN SERPL-MCNC: 10 MG/DL — SIGNIFICANT CHANGE UP (ref 7–23)
CALCIUM SERPL-MCNC: 8.4 MG/DL — SIGNIFICANT CHANGE UP (ref 8.4–10.5)
CHLORIDE SERPL-SCNC: 100 MMOL/L — SIGNIFICANT CHANGE UP (ref 98–107)
CO2 SERPL-SCNC: 27 MMOL/L — SIGNIFICANT CHANGE UP (ref 22–31)
CREAT SERPL-MCNC: 0.49 MG/DL — LOW (ref 0.5–1.3)
GLUCOSE SERPL-MCNC: 122 MG/DL — HIGH (ref 70–99)
HCT VFR BLD CALC: 33.7 % — LOW (ref 34.5–45)
HGB BLD-MCNC: 10.8 G/DL — LOW (ref 11.5–15.5)
MAGNESIUM SERPL-MCNC: 2.2 MG/DL — SIGNIFICANT CHANGE UP (ref 1.6–2.6)
MCHC RBC-ENTMCNC: 32 % — SIGNIFICANT CHANGE UP (ref 32–36)
MCHC RBC-ENTMCNC: 33.5 PG — SIGNIFICANT CHANGE UP (ref 27–34)
MCV RBC AUTO: 104.7 FL — HIGH (ref 80–100)
NRBC # FLD: 0 — SIGNIFICANT CHANGE UP
PHOSPHATE SERPL-MCNC: 2.4 MG/DL — LOW (ref 2.5–4.5)
PLATELET # BLD AUTO: 155 K/UL — SIGNIFICANT CHANGE UP (ref 150–400)
PMV BLD: 11.2 FL — SIGNIFICANT CHANGE UP (ref 7–13)
POTASSIUM SERPL-MCNC: 3.6 MMOL/L — SIGNIFICANT CHANGE UP (ref 3.5–5.3)
POTASSIUM SERPL-SCNC: 3.6 MMOL/L — SIGNIFICANT CHANGE UP (ref 3.5–5.3)
RBC # BLD: 3.22 M/UL — LOW (ref 3.8–5.2)
RBC # FLD: 13.3 % — SIGNIFICANT CHANGE UP (ref 10.3–14.5)
SODIUM SERPL-SCNC: 137 MMOL/L — SIGNIFICANT CHANGE UP (ref 135–145)
WBC # BLD: 9.75 K/UL — SIGNIFICANT CHANGE UP (ref 3.8–10.5)
WBC # FLD AUTO: 9.75 K/UL — SIGNIFICANT CHANGE UP (ref 3.8–10.5)

## 2018-04-08 PROCEDURE — 71250 CT THORAX DX C-: CPT | Mod: 26

## 2018-04-08 PROCEDURE — 71045 X-RAY EXAM CHEST 1 VIEW: CPT | Mod: 26

## 2018-04-08 PROCEDURE — 99233 SBSQ HOSP IP/OBS HIGH 50: CPT

## 2018-04-08 RX ORDER — MAGNESIUM SULFATE 500 MG/ML
1 VIAL (ML) INJECTION ONCE
Qty: 0 | Refills: 0 | Status: COMPLETED | OUTPATIENT
Start: 2018-04-08 | End: 2018-04-08

## 2018-04-08 RX ORDER — POTASSIUM PHOSPHATE, MONOBASIC POTASSIUM PHOSPHATE, DIBASIC 236; 224 MG/ML; MG/ML
15 INJECTION, SOLUTION INTRAVENOUS ONCE
Qty: 0 | Refills: 0 | Status: COMPLETED | OUTPATIENT
Start: 2018-04-08 | End: 2018-04-08

## 2018-04-08 RX ORDER — METOPROLOL TARTRATE 50 MG
2.5 TABLET ORAL ONCE
Qty: 0 | Refills: 0 | Status: COMPLETED | OUTPATIENT
Start: 2018-04-08 | End: 2018-04-08

## 2018-04-08 RX ORDER — SODIUM CHLORIDE 9 MG/ML
250 INJECTION, SOLUTION INTRAVENOUS ONCE
Qty: 0 | Refills: 0 | Status: COMPLETED | OUTPATIENT
Start: 2018-04-08 | End: 2018-04-08

## 2018-04-08 RX ORDER — PANTOPRAZOLE SODIUM 20 MG/1
40 TABLET, DELAYED RELEASE ORAL
Qty: 0 | Refills: 0 | Status: DISCONTINUED | OUTPATIENT
Start: 2018-04-08 | End: 2018-04-13

## 2018-04-08 RX ORDER — DIGOXIN 250 MCG
0.5 TABLET ORAL ONCE
Qty: 0 | Refills: 0 | Status: COMPLETED | OUTPATIENT
Start: 2018-04-08 | End: 2018-04-08

## 2018-04-08 RX ORDER — POTASSIUM CHLORIDE 20 MEQ
40 PACKET (EA) ORAL ONCE
Qty: 0 | Refills: 0 | Status: COMPLETED | OUTPATIENT
Start: 2018-04-08 | End: 2018-04-08

## 2018-04-08 RX ORDER — CALCIUM GLUCONATE 100 MG/ML
1 VIAL (ML) INTRAVENOUS ONCE
Qty: 0 | Refills: 0 | Status: COMPLETED | OUTPATIENT
Start: 2018-04-08 | End: 2018-04-08

## 2018-04-08 RX ORDER — DILTIAZEM HCL 120 MG
10 CAPSULE, EXT RELEASE 24 HR ORAL
Qty: 125 | Refills: 0 | Status: DISCONTINUED | OUTPATIENT
Start: 2018-04-08 | End: 2018-04-11

## 2018-04-08 RX ADMIN — Medication 100 GRAM(S): at 03:25

## 2018-04-08 RX ADMIN — POTASSIUM PHOSPHATE, MONOBASIC POTASSIUM PHOSPHATE, DIBASIC 62.5 MILLIMOLE(S): 236; 224 INJECTION, SOLUTION INTRAVENOUS at 05:58

## 2018-04-08 RX ADMIN — Medication 100 GRAM(S): at 02:55

## 2018-04-08 RX ADMIN — Medication 100 MILLIGRAM(S): at 05:58

## 2018-04-08 RX ADMIN — HYDROMORPHONE HYDROCHLORIDE 30 MILLILITER(S): 2 INJECTION INTRAMUSCULAR; INTRAVENOUS; SUBCUTANEOUS at 19:21

## 2018-04-08 RX ADMIN — PANTOPRAZOLE SODIUM 40 MILLIGRAM(S): 20 TABLET, DELAYED RELEASE ORAL at 07:15

## 2018-04-08 RX ADMIN — Medication 1 MILLIGRAM(S): at 17:14

## 2018-04-08 RX ADMIN — Medication 10 MG/HR: at 19:21

## 2018-04-08 RX ADMIN — SODIUM CHLORIDE 30 MILLILITER(S): 9 INJECTION, SOLUTION INTRAVENOUS at 07:16

## 2018-04-08 RX ADMIN — HYDROMORPHONE HYDROCHLORIDE 30 MILLILITER(S): 2 INJECTION INTRAMUSCULAR; INTRAVENOUS; SUBCUTANEOUS at 07:17

## 2018-04-08 RX ADMIN — Medication 2.5 MILLIGRAM(S): at 04:47

## 2018-04-08 RX ADMIN — SODIUM CHLORIDE 1000 MILLILITER(S): 9 INJECTION, SOLUTION INTRAVENOUS at 09:09

## 2018-04-08 RX ADMIN — Medication 0.5 MILLIGRAM(S): at 20:24

## 2018-04-08 RX ADMIN — SODIUM CHLORIDE 750 MILLILITER(S): 9 INJECTION, SOLUTION INTRAVENOUS at 04:48

## 2018-04-08 RX ADMIN — Medication 10 MG/HR: at 09:09

## 2018-04-08 RX ADMIN — SODIUM CHLORIDE 30 MILLILITER(S): 9 INJECTION, SOLUTION INTRAVENOUS at 19:21

## 2018-04-08 RX ADMIN — Medication 100 MILLIGRAM(S): at 13:19

## 2018-04-08 RX ADMIN — SODIUM CHLORIDE 750 MILLILITER(S): 9 INJECTION, SOLUTION INTRAVENOUS at 04:23

## 2018-04-08 RX ADMIN — HEPARIN SODIUM 5000 UNIT(S): 5000 INJECTION INTRAVENOUS; SUBCUTANEOUS at 23:16

## 2018-04-08 RX ADMIN — Medication 200 GRAM(S): at 09:46

## 2018-04-08 RX ADMIN — Medication 40 MILLIEQUIVALENT(S): at 05:58

## 2018-04-08 RX ADMIN — HEPARIN SODIUM 5000 UNIT(S): 5000 INJECTION INTRAVENOUS; SUBCUTANEOUS at 05:58

## 2018-04-08 RX ADMIN — Medication 1 MILLIGRAM(S): at 23:16

## 2018-04-08 RX ADMIN — HEPARIN SODIUM 5000 UNIT(S): 5000 INJECTION INTRAVENOUS; SUBCUTANEOUS at 13:19

## 2018-04-08 RX ADMIN — Medication 150 MILLIGRAM(S): at 12:24

## 2018-04-08 RX ADMIN — Medication 1 MILLIGRAM(S): at 09:18

## 2018-04-08 RX ADMIN — Medication 100 MILLIGRAM(S): at 23:16

## 2018-04-08 NOTE — PROGRESS NOTE ADULT - SUBJECTIVE AND OBJECTIVE BOX
_________________________________________________________________________________________  ========>>  M E D I C A L   A T T E N D I N G    F O L L O W  U P  N O T E  <<=========  -----------------------------------------------------------------------------------------------------    - Patient seen and examined by me approximately thirty minutes ago.   - In summary, patient is a 73y year old woman who originally presented with tachycardia   - Patient doing ok post op, comfortable, pain improved      pt overnight went back into Afib with RVR, now on cardizem drip, rate better controlled     ==================>> REVIEW OF SYSTEM <<=================    GEN: no fever, no chills, pain as above  RESP: no SOB, no cough, no sputum  CVS: no chest pain, no palpitations, no edema  GI: no abdominal pain, no nausea, no constipation, no diarrhea  : no dysuria, no frequency, no hematuria  Neuro: no headache, no dizziness  Derm : no itching, no rash    ==================>> PHYSICAL EXAM <<=================    GEN: A&O X 3 , NAD , comfortable in bed  HEENT: NCAT, PERRL, MMM, hearing intact  Neck: supple , no JVD  CVS: S1S2 , Irregular , No M/R/G appreciated  PULM: CTA B/L,  no W/R/R appreciated, chest tube in place   ABD.: soft. non tender, non distended,  bowel sounds present  Extrem: intact pulses , no edema       chest tube in place   PSYCH : normal mood,  not anxious       ==================>> MEDICATIONS <<====================    diltiazem    Tablet 30 milliGRAM(s) Oral every 6 hours  diltiazem Infusion 10 mG/Hr IV Continuous <Continuous>  docusate sodium 100 milliGRAM(s) Oral three times a day  heparin  Injectable 5000 Unit(s) SubCutaneous every 8 hours  HYDROmorphone PCA (1 mG/mL) 30 milliLiter(s) PCA Continuous PCA Continuous  lactated ringers. 1000 milliLiter(s) IV Continuous <Continuous>  pantoprazole    Tablet 40 milliGRAM(s) Oral before breakfast  venlafaxine XR. 150 milliGRAM(s) Oral daily    MEDICATIONS  (PRN):  ALPRAZolam 1 milliGRAM(s) Oral three times a day PRN anxiety  HYDROmorphone PCA (1 mG/mL) Rescue Clinician Bolus 0.5 milliGRAM(s) IV Push every 15 minutes PRN for Pain Scale GREATER THAN 6  LORazepam   Injectable 2 milliGRAM(s) IV Push every 1 hour PRN Symptom-triggered: each CIWA -Ar score 8 or GREATER  naloxone Injectable 0.1 milliGRAM(s) IV Push every 3 minutes PRN For ANY of the following changes in patient status:  A. RR LESS THAN 10 breaths per minute, B. Oxygen saturation LESS THAN 90%, C. Sedation score of 6  ondansetron Injectable 4 milliGRAM(s) IV Push every 6 hours PRN Nausea    ==================>> VITAL SIGNS <<==================    Vital Signs Last 24 Hrs  T(C): 36.8 (04-08-18 @ 08:00)  T(F): 98.2 (04-08-18 @ 08:00), Max: 98.9 (04-07-18 @ 20:00)  HR: 111 (04-08-18 @ 11:00) (56 - 155)  BP: 96/60 (04-08-18 @ 11:00)  BP(mean): 69 (04-08-18 @ 11:00) (69 - 102)  RR: 18 (04-08-18 @ 11:00) (18 - 36)  SpO2: 93% (04-08-18 @ 11:00) (93% - 99%)       ==================>> LAB AND IMAGING <<==================                        10.8   9.75  )-----------( 155      ( 08 Apr 2018 04:00 )             33.7        04-08    137  |  100  |  10  ----------------------------<  122<H>  3.6   |  27  |  0.49<L>    Sodium:   137  <==, 139  <==, 139  <==, 140  <==, 140  <==, 140  <==, 142  <==    Ca    8.4      08 Apr 2018 04:00  Phos  2.4     04-08  Mg     2.2     04-08    ___________________________________________________________________________________  ===============>>  A S S E S S M E N T   A N D   P L A N <<===============  ------------------------------------------------------------------------------------------    · Assessment		      72 yo F PMHx Afib with multiple ablations in the past at [facility] (4 years ago and in Nov 2017) and cardioversion (Dec 2017) sent from pre-surgical testing for palpitations, found to be in Rapid Afib @ 167bpm, admitted to tele for Rapid Afib, r/o ACS and Alcohol Withdrawal.  +Rapid Afib  +Alcohol Withdrawal  + lung mass    Problem/Plan - 1:  ·  Problem: Atrial fibrillation with RVR,   tele monitor   EP and cardio f/u  Cardizem now IV drip  encourage PO hydration.   AC as able post op     Problem/Plan - 2:  ·  Problem: Alcohol withdrawal, overall improved   CIWA protocol as above: PRN only   Monitor VS as per routine  continue Venlafaxine for depression history     Problem/Plan - 3:  ·  Problem: Lung mass / nodule  doing well post surgical resection   close telemetry monitoring Emily-Op   mgmt of chest tube per surgical team  follow pathology    -GI/DVT Prophylaxis.  - PT, OOB    --------------------------------------------  Case discussed with pt, cardio  Education given on findings and plan of care  ___________________________  H. MAKI Silva.  Pager: [phone]

## 2018-04-08 NOTE — PROGRESS NOTE ADULT - SUBJECTIVE AND OBJECTIVE BOX
Anesthesia Pain Management Service    SUBJECTIVE: Patient is doing well with IV PCA and no significant problems reported.    Pain Scale Score	At rest: ___ 	With Activity: ___ 	[X ] Refer to charted pain scores    THERAPY:    [ ] IV PCA Morphine		[ ] 5 mg/mL	[ ] 1 mg/mL  [X ] IV PCA Hydromorphone	[ ] 5 mg/mL	[X ] 1 mg/mL  [ ] IV PCA Fentanyl		[ ] 50 micrograms/mL    Demand dose __0.2_ lockout __6_ (minutes) Continuous Rate _0__ Total: 1.2mg___  Daily      MEDICATIONS  (STANDING):  diltiazem    Tablet 30 milliGRAM(s) Oral every 6 hours  diltiazem Infusion 10 mG/Hr (10 mL/Hr) IV Continuous <Continuous>  docusate sodium 100 milliGRAM(s) Oral three times a day  heparin  Injectable 5000 Unit(s) SubCutaneous every 8 hours  HYDROmorphone PCA (1 mG/mL) 30 milliLiter(s) PCA Continuous PCA Continuous  lactated ringers Bolus 250 milliLiter(s) IV Bolus once  lactated ringers. 1000 milliLiter(s) (30 mL/Hr) IV Continuous <Continuous>  pantoprazole    Tablet 40 milliGRAM(s) Oral before breakfast  venlafaxine XR. 150 milliGRAM(s) Oral daily    MEDICATIONS  (PRN):  ALPRAZolam 1 milliGRAM(s) Oral three times a day PRN anxiety  HYDROmorphone PCA (1 mG/mL) Rescue Clinician Bolus 0.5 milliGRAM(s) IV Push every 15 minutes PRN for Pain Scale GREATER THAN 6  LORazepam   Injectable 2 milliGRAM(s) IV Push every 1 hour PRN Symptom-triggered: each CIWA -Ar score 8 or GREATER  naloxone Injectable 0.1 milliGRAM(s) IV Push every 3 minutes PRN For ANY of the following changes in patient status:  A. RR LESS THAN 10 breaths per minute, B. Oxygen saturation LESS THAN 90%, C. Sedation score of 6  ondansetron Injectable 4 milliGRAM(s) IV Push every 6 hours PRN Nausea      OBJECTIVE:    Sedation Score:	[ X] Alert	[ ] Drowsy 	[ ] Arousable	[ ] Asleep	[ ] Unresponsive    Side Effects:	[X ] None	[ ] Nausea	[ ] Vomiting	[ ] Pruritus  		[ ] Other:    Vital Signs Last 24 Hrs  T(C): 36.8 (08 Apr 2018 08:00), Max: 37.2 (07 Apr 2018 20:00)  T(F): 98.2 (08 Apr 2018 08:00), Max: 98.9 (07 Apr 2018 20:00)  HR: 130 (08 Apr 2018 08:00) (56 - 155)  BP: 113/95 (08 Apr 2018 08:00) (92/72 - 131/76)  BP(mean): 100 (08 Apr 2018 08:00) (70 - 102)  RR: 19 (08 Apr 2018 08:00) (19 - 36)  SpO2: 97% (08 Apr 2018 08:00) (93% - 99%)    ASSESSMENT/ PLAN    Therapy to  be:	[ X] Continue   [ ] Discontinued   [ ] Change to prn Analgesics    Documentation and Verification of current medications:   [X] Done	[ ] Not done, not elligible    Comments:

## 2018-04-08 NOTE — PROGRESS NOTE ADULT - SUBJECTIVE AND OBJECTIVE BOX
Subjective: Patient seen and examined. No new events except as noted.     SUBJECTIVE/ROS:  feels ok       MEDICATIONS:  MEDICATIONS  (STANDING):  calcium gluconate IVPB 1 Gram(s) IV Intermittent once  diltiazem    Tablet 30 milliGRAM(s) Oral every 6 hours  diltiazem Infusion 10 mG/Hr (10 mL/Hr) IV Continuous <Continuous>  docusate sodium 100 milliGRAM(s) Oral three times a day  heparin  Injectable 5000 Unit(s) SubCutaneous every 8 hours  HYDROmorphone PCA (1 mG/mL) 30 milliLiter(s) PCA Continuous PCA Continuous  lactated ringers. 1000 milliLiter(s) (30 mL/Hr) IV Continuous <Continuous>  pantoprazole    Tablet 40 milliGRAM(s) Oral before breakfast  venlafaxine XR. 150 milliGRAM(s) Oral daily      PHYSICAL EXAM:  T(C): 36.8 (04-08-18 @ 08:00), Max: 37.2 (04-07-18 @ 20:00)  HR: 134 (04-08-18 @ 09:00) (56 - 155)  BP: 104/76 (04-08-18 @ 09:00) (92/72 - 131/76)  RR: 33 (04-08-18 @ 09:00) (19 - 36)  SpO2: 95% (04-08-18 @ 09:00) (93% - 99%)  Wt(kg): --  I&O's Summary    07 Apr 2018 07:01  -  08 Apr 2018 07:00  --------------------------------------------------------  IN: 2130 mL / OUT: 1400 mL / NET: 730 mL    08 Apr 2018 07:01  -  08 Apr 2018 09:36  --------------------------------------------------------  IN: 640 mL / OUT: 400 mL / NET: 240 mL          Appearance: Normal	  HEENT:   Normal oral mucosa, PERRL, EOMI	  Cardiovascular: Normal S1 S2,    Murmur:   Neck: JVP normal  Respiratory: Lungs clear to auscultation  Gastrointestinal:  Soft, Non-tender, + BS	  Skin: normal   Neuro: No gross deficits.   Psychiatry:  Mood & affect appropriate  Ext: No edema      LABS/DATA:    CARDIAC MARKERS:                                10.8   9.75  )-----------( 155      ( 08 Apr 2018 04:00 )             33.7     04-08    137  |  100  |  10  ----------------------------<  122<H>  3.6   |  27  |  0.49<L>    Ca    8.4      08 Apr 2018 04:00  Phos  2.4     04-08  Mg     2.2     04-08      proBNP:   Lipid Profile:   HgA1c:   TSH:     TELE:  EKG:

## 2018-04-08 NOTE — PROGRESS NOTE ADULT - SUBJECTIVE AND OBJECTIVE BOX
SANTIAGO MAURICIO  MRN-[id number]    HPI:  72 yo F PMHx Afib with multiple ablations in the past at [facility] (4 years ago and in Nov 2017) and cardioversion (Dec 2017) sent from pre-surgical testing for rapid afib and palpitations.   Pt recently found to have a long nodule that is scheduled for left VAT, left upper lobe wedge resection on Friday (3/6/18). At pre-surgical testing today, she felt very anxious and palpitations, described as "heart racing." Upon checking her heart rate she was @ 160's bpm and was sent to [facility] for evaluation. Pt denies chest pain, sob, diaphoresis, nausea, vomiting or abdominal pain.     In ED she found to have Rapid Afib @ 167bpm and was given multiple doses of IV Cardizem with partial rate control.   On admission: she in Afib @ 114 bpm. Pt admits to drinking 3-4 glasses of wine daily. However, last intake of alcohol was on Easter Sunday (2 days ago) where she drank 1 bottle of wine. (03 Apr 2018 14:05)      Procedure: 04/06/2018  VATS Lingula sparing left upper lobectomy  POD # : 2    Issues:  Lung nodule  post op pain  A fib/flutter  Alcohol abuse  Hypertension  Dyslipidemia  Depression    PAST MEDICAL & SURGICAL HISTORY:  Alcohol abuse  Hypertension  Dyslipidemia  Depression  Atrial fibrillation  Arthritis  Anxiety  History of cardioversion: 12/2017  H/O prior ablation treatment: cardiac 11/2017      ***VITAL SIGNS:  Vital Signs Last 24 Hrs  T(C): 36.7 (08 Apr 2018 12:00), Max: 37.2 (07 Apr 2018 20:00)  T(F): 98 (08 Apr 2018 12:00), Max: 98.9 (07 Apr 2018 20:00)  HR: 114 (08 Apr 2018 12:00) (56 - 155)  BP: 101/77 (08 Apr 2018 12:00) (92/72 - 131/76)  BP(mean): 82 (08 Apr 2018 12:00) (69 - 102)  RR: 16 (08 Apr 2018 12:00) (16 - 36)  SpO2: 93% (08 Apr 2018 12:00) (93% - 99%)  I/Os:   I&O's Detail    07 Apr 2018 07:01  -  08 Apr 2018 07:00  --------------------------------------------------------  IN:    IV PiggyBack: 250 mL    Lactated Ringers IV Bolus: 250 mL    lactated ringers.: 810 mL    Oral Fluid: 820 mL  Total IN: 2130 mL    OUT:    Chest Tube: 100 mL    Voided: 1300 mL  Total OUT: 1400 mL    Total NET: 730 mL      08 Apr 2018 07:01  -  08 Apr 2018 12:57  --------------------------------------------------------  IN:    diltiazem Infusion: 40 mL    IV PiggyBack: 50 mL    Lactated Ringers IV Bolus: 250 mL    lactated ringers.: 120 mL    Oral Fluid: 240 mL  Total IN: 700 mL    OUT:    Chest Tube: 70 mL    Voided: 400 mL  Total OUT: 470 mL    Total NET: 230 mL        CAPILLARY BLOOD GLUCOSE      ======================= PHYSICAL EXAM============================  General:                         Awake, alert, GCS 15 not in any distress, in chair  Neuro:                            Moving all extremities to commands. No acute change from baseline exam.	  Respiratory:	Lungs clear to auscultation bilaterally. Good aeration.                                         Chest tube to suction.  CV:		Regular rate and rhythm. Normal S1/S2. No murmurs                                          Distal pulses present.  Abdomen:	                     Soft, non-distended. Bowel sounds present   Skin:		No rash.  Extremities:	Warm, no cyanosis or edema.  Palpable pulses    ============================ LABS =========================                        10.8   9.75  )-----------( 155      ( 08 Apr 2018 04:00 )             33.7     04-08    137  |  100  |  10  ----------------------------<  122<H>  3.6   |  27  |  0.49<L>    Ca    8.4      08 Apr 2018 04:00  Phos  2.4     04-08  Mg     2.2     04-08        PT/INR - ( 07 Apr 2018 04:00 )   PT: 10.9 SEC;   INR: 0.98          PTT - ( 07 Apr 2018 04:00 )  PTT:23.6 SEC  ABG - ( 07 Apr 2018 04:00 )  pH: 7.40  /  pCO2: 40    /  pO2: 103   / HCO3: 25    / Base Excess: 0.6   /  SaO2: 98.1                ===================== IMAGING STUDIES =========================  < from: CT Chest No Cont (04.08.18 @ 11:04) >  LUNGS AND LARGE AIRWAYS: Patent central airways. Status post lingular   sparing left upper lobectomy. Lingular consolidation, likely atelectasis.  PLEURA: Small left pneumothorax with a chest tube.  VESSELS: Atherosclerotic calcifications.  HEART: Heart size is normal. No pericardial effusion.  MEDIASTINUM AND GUALBERTO: No lymphadenopathy.  CHEST WALL AND LOWER NECK: Left chest wall subcutaneous emphysema.  VISUALIZEDUPPER ABDOMEN: Within normal limits.  BONES: Within normal limits.    IMPRESSION:     Small left pneumothorax. Postoperative changes of partial left upper   lobectomy.    Findings were discussed with Dr. Conn 12:00 PM on 4/8/2018.      =======================  MEDICATIONS  =================  MEDICATIONS  (STANDING):  diltiazem    Tablet 30 milliGRAM(s) Oral every 6 hours  diltiazem Infusion 10 mG/Hr (10 mL/Hr) IV Continuous <Continuous>  docusate sodium 100 milliGRAM(s) Oral three times a day  heparin  Injectable 5000 Unit(s) SubCutaneous every 8 hours  HYDROmorphone PCA (1 mG/mL) 30 milliLiter(s) PCA Continuous PCA Continuous  lactated ringers. 1000 milliLiter(s) (30 mL/Hr) IV Continuous <Continuous>  pantoprazole    Tablet 40 milliGRAM(s) Oral before breakfast  venlafaxine XR. 150 milliGRAM(s) Oral daily    MEDICATIONS  (PRN):  ALPRAZolam 1 milliGRAM(s) Oral three times a day PRN anxiety  HYDROmorphone PCA (1 mG/mL) Rescue Clinician Bolus 0.5 milliGRAM(s) IV Push every 15 minutes PRN for Pain Scale GREATER THAN 6  LORazepam   Injectable 2 milliGRAM(s) IV Push every 1 hour PRN Symptom-triggered: each CIWA -Ar score 8 or GREATER  naloxone Injectable 0.1 milliGRAM(s) IV Push every 3 minutes PRN For ANY of the following changes in patient status:  A. RR LESS THAN 10 breaths per minute, B. Oxygen saturation LESS THAN 90%, C. Sedation score of 6  ondansetron Injectable 4 milliGRAM(s) IV Push every 6 hours PRN Nausea      ====================== NEUROLOGY=====================  Pain control with PCA / Tylenol IV   ==================== RESPIRATORY======================  Pt is on 2 L nasal canula  CXR/CT chest:  Small L PTX.   Comfortable, not in any distress.  Using incentive spirometry  Monitor chest tube output  Chest tube to suction   Continue bronchodilators, pulmonary toilet    ====================CARDIOVASCULAR==================  Continue hemodynamic monitoring.  Not on any pressors      ===================== RENAL =========================  Continue LR  IVF  Monitor I/Os and electrolytes      ==================== GASTROINTESTINAL===================  On regular diet, tolerating  Continue GI prophylaxis with Protonix  Continue Zofran / Reglan for nausea - PRN	      =======================    ENDOCRIN  =====================  Glycemic monitoring  F/S with coverage  ===================HEMATOLOGIC/ONC ===================  Monitor chest tube output. No signs of active bleeding.   Follow CBC in AM  DVT prophylaxis with SCD, sc Heparin    ========================INFECTIOUS DISEASE================  No signs of infection. Monitor for fever / leukocytosis.  All surgical incision / chest tube  sites look clean        Pertinent clinical, laboratory, radiographic, hemodynamic, echocardiographic, respiratory data, microbiologic data and chart were reviewed and analyzed frequently throughout the course of the day and night. GI and DVT prophylaxis, glycemic control, head of bed elevation and skin care issues were addressed.  Patient seen, examined and plan discussed with CT Surgery / CTICU team during rounds.        Nola Conn DO, FACEP

## 2018-04-08 NOTE — PROGRESS NOTE ADULT - ASSESSMENT
afib with RVR  back in afib  cardizem infusion   resume a/c once ok with cts    etoh withdrawal  stable

## 2018-04-09 LAB
ALBUMIN SERPL ELPH-MCNC: 3.2 G/DL — LOW (ref 3.3–5)
ALP SERPL-CCNC: 47 U/L — SIGNIFICANT CHANGE UP (ref 40–120)
ALT FLD-CCNC: 18 U/L — SIGNIFICANT CHANGE UP (ref 4–33)
APTT BLD: 20.5 SEC — LOW (ref 27.5–37.4)
AST SERPL-CCNC: 26 U/L — SIGNIFICANT CHANGE UP (ref 4–32)
BILIRUB SERPL-MCNC: 0.3 MG/DL — SIGNIFICANT CHANGE UP (ref 0.2–1.2)
BUN SERPL-MCNC: 6 MG/DL — LOW (ref 7–23)
CALCIUM SERPL-MCNC: 8.4 MG/DL — SIGNIFICANT CHANGE UP (ref 8.4–10.5)
CHLORIDE SERPL-SCNC: 98 MMOL/L — SIGNIFICANT CHANGE UP (ref 98–107)
CO2 SERPL-SCNC: 26 MMOL/L — SIGNIFICANT CHANGE UP (ref 22–31)
CREAT SERPL-MCNC: 0.47 MG/DL — LOW (ref 0.5–1.3)
GLUCOSE SERPL-MCNC: 102 MG/DL — HIGH (ref 70–99)
HCT VFR BLD CALC: 36.6 % — SIGNIFICANT CHANGE UP (ref 34.5–45)
HGB BLD-MCNC: 11.9 G/DL — SIGNIFICANT CHANGE UP (ref 11.5–15.5)
INR BLD: 0.97 — SIGNIFICANT CHANGE UP (ref 0.88–1.17)
MAGNESIUM SERPL-MCNC: 1.8 MG/DL — SIGNIFICANT CHANGE UP (ref 1.6–2.6)
MCHC RBC-ENTMCNC: 32.5 % — SIGNIFICANT CHANGE UP (ref 32–36)
MCHC RBC-ENTMCNC: 33.9 PG — SIGNIFICANT CHANGE UP (ref 27–34)
MCV RBC AUTO: 104.3 FL — HIGH (ref 80–100)
NRBC # FLD: 0 — SIGNIFICANT CHANGE UP
PLATELET # BLD AUTO: 174 K/UL — SIGNIFICANT CHANGE UP (ref 150–400)
PMV BLD: 11.2 FL — SIGNIFICANT CHANGE UP (ref 7–13)
POTASSIUM SERPL-MCNC: 4.2 MMOL/L — SIGNIFICANT CHANGE UP (ref 3.5–5.3)
POTASSIUM SERPL-SCNC: 4.2 MMOL/L — SIGNIFICANT CHANGE UP (ref 3.5–5.3)
PROT SERPL-MCNC: 6.3 G/DL — SIGNIFICANT CHANGE UP (ref 6–8.3)
PROTHROM AB SERPL-ACNC: 10.8 SEC — SIGNIFICANT CHANGE UP (ref 9.8–13.1)
RBC # BLD: 3.51 M/UL — LOW (ref 3.8–5.2)
RBC # FLD: 13.2 % — SIGNIFICANT CHANGE UP (ref 10.3–14.5)
SODIUM SERPL-SCNC: 136 MMOL/L — SIGNIFICANT CHANGE UP (ref 135–145)
WBC # BLD: 7.19 K/UL — SIGNIFICANT CHANGE UP (ref 3.8–10.5)
WBC # FLD AUTO: 7.19 K/UL — SIGNIFICANT CHANGE UP (ref 3.8–10.5)

## 2018-04-09 PROCEDURE — 99233 SBSQ HOSP IP/OBS HIGH 50: CPT

## 2018-04-09 PROCEDURE — 71045 X-RAY EXAM CHEST 1 VIEW: CPT | Mod: 26

## 2018-04-09 RX ORDER — MAGNESIUM SULFATE 500 MG/ML
2 VIAL (ML) INJECTION ONCE
Qty: 0 | Refills: 0 | Status: COMPLETED | OUTPATIENT
Start: 2018-04-09 | End: 2018-04-09

## 2018-04-09 RX ORDER — OXYCODONE HYDROCHLORIDE 5 MG/1
5 TABLET ORAL
Qty: 0 | Refills: 0 | Status: DISCONTINUED | OUTPATIENT
Start: 2018-04-09 | End: 2018-04-13

## 2018-04-09 RX ORDER — ACETAMINOPHEN 500 MG
650 TABLET ORAL EVERY 6 HOURS
Qty: 0 | Refills: 0 | Status: COMPLETED | OUTPATIENT
Start: 2018-04-09 | End: 2018-04-11

## 2018-04-09 RX ORDER — RIVAROXABAN 15 MG-20MG
20 KIT ORAL EVERY 24 HOURS
Qty: 0 | Refills: 0 | Status: DISCONTINUED | OUTPATIENT
Start: 2018-04-09 | End: 2018-04-13

## 2018-04-09 RX ORDER — METOPROLOL TARTRATE 50 MG
12.5 TABLET ORAL
Qty: 0 | Refills: 0 | Status: DISCONTINUED | OUTPATIENT
Start: 2018-04-09 | End: 2018-04-10

## 2018-04-09 RX ORDER — OXYCODONE HYDROCHLORIDE 5 MG/1
10 TABLET ORAL
Qty: 0 | Refills: 0 | Status: DISCONTINUED | OUTPATIENT
Start: 2018-04-09 | End: 2018-04-13

## 2018-04-09 RX ADMIN — Medication 150 MILLIGRAM(S): at 12:49

## 2018-04-09 RX ADMIN — Medication 1 MILLIGRAM(S): at 12:49

## 2018-04-09 RX ADMIN — Medication 100 MILLIGRAM(S): at 06:26

## 2018-04-09 RX ADMIN — Medication 650 MILLIGRAM(S): at 12:49

## 2018-04-09 RX ADMIN — HEPARIN SODIUM 5000 UNIT(S): 5000 INJECTION INTRAVENOUS; SUBCUTANEOUS at 13:05

## 2018-04-09 RX ADMIN — HEPARIN SODIUM 5000 UNIT(S): 5000 INJECTION INTRAVENOUS; SUBCUTANEOUS at 06:26

## 2018-04-09 RX ADMIN — Medication 10 MG/HR: at 07:20

## 2018-04-09 RX ADMIN — Medication 100 MILLIGRAM(S): at 21:01

## 2018-04-09 RX ADMIN — Medication 100 MILLIGRAM(S): at 13:05

## 2018-04-09 RX ADMIN — PANTOPRAZOLE SODIUM 40 MILLIGRAM(S): 20 TABLET, DELAYED RELEASE ORAL at 06:26

## 2018-04-09 RX ADMIN — SODIUM CHLORIDE 30 MILLILITER(S): 9 INJECTION, SOLUTION INTRAVENOUS at 07:20

## 2018-04-09 RX ADMIN — RIVAROXABAN 20 MILLIGRAM(S): KIT at 17:08

## 2018-04-09 RX ADMIN — Medication 650 MILLIGRAM(S): at 13:05

## 2018-04-09 RX ADMIN — Medication 12.5 MILLIGRAM(S): at 17:08

## 2018-04-09 RX ADMIN — Medication 650 MILLIGRAM(S): at 17:08

## 2018-04-09 RX ADMIN — Medication 50 GRAM(S): at 06:26

## 2018-04-09 RX ADMIN — HYDROMORPHONE HYDROCHLORIDE 30 MILLILITER(S): 2 INJECTION INTRAMUSCULAR; INTRAVENOUS; SUBCUTANEOUS at 07:19

## 2018-04-09 RX ADMIN — Medication 650 MILLIGRAM(S): at 17:50

## 2018-04-09 NOTE — PROGRESS NOTE ADULT - ASSESSMENT
afib with RVR  stable now in sinus   cardizem infusion   resume a/c once ok with cts    etoh withdrawal  stable

## 2018-04-09 NOTE — PROGRESS NOTE ADULT - SUBJECTIVE AND OBJECTIVE BOX
Day _2_ of Anesthesia Pain Management Service    Allergies  No Known Allergies      SUBJECTIVE: "I have most pain where the tube is in my side. The pump helps."    Pain Scale Score	At rest: _4/10_ 	With Activity: ___ 	[ ] Refer to charted pain scores    THERAPY:    [ ] IV PCA Morphine		[ ] 5 mg/mL	[ ] 1 mg/mL  [X] IV PCA Hydromorphone	[ ] 5 mg/mL	[X] 1 mg/mL  [ ] IV PCA Fentanyl		[ ] 50 micrograms/mL    Demand dose _0.2mg_ lockout _6_ (minutes) Continuous Rate _0_ Total: _2.8mg_  Daily      MEDICATIONS  (STANDING):  acetaminophen   Tablet. 650 milliGRAM(s) Oral every 6 hours  diltiazem Infusion 10 mG/Hr (10 mL/Hr) IV Continuous <Continuous>  diltiazem Injectable 10 milliGRAM(s) IV Push once  docusate sodium 100 milliGRAM(s) Oral three times a day  heparin  Injectable 5000 Unit(s) SubCutaneous every 8 hours  lactated ringers. 1000 milliLiter(s) (30 mL/Hr) IV Continuous <Continuous>  metoprolol tartrate 12.5 milliGRAM(s) Oral two times a day  oxyCODONE    IR 10 milliGRAM(s) Oral every 3 hours  pantoprazole    Tablet 40 milliGRAM(s) Oral before breakfast  rivaroxaban 20 milliGRAM(s) Oral every 24 hours  venlafaxine XR. 150 milliGRAM(s) Oral daily    MEDICATIONS  (PRN):  ALPRAZolam 1 milliGRAM(s) Oral three times a day PRN anxiety  LORazepam   Injectable 2 milliGRAM(s) IV Push every 1 hour PRN Symptom-triggered: each CIWA -Ar score 8 or GREATER  naloxone Injectable 0.1 milliGRAM(s) IV Push every 3 minutes PRN For ANY of the following changes in patient status:  A. RR LESS THAN 10 breaths per minute, B. Oxygen saturation LESS THAN 90%, C. Sedation score of 6  ondansetron Injectable 4 milliGRAM(s) IV Push every 6 hours PRN Nausea  oxyCODONE    IR 5 milliGRAM(s) Oral every 3 hours PRN Moderate Pain (4 - 6)      OBJECTIVE: A&Ox3, NAD, sitting up in chair    Sedation Score:	[X] Alert	[ ] Drowsy	[ ] Arousable	[ ] Asleep	[ ] Unresponsive    Side Effects:	[X] None	[ ] Nausea	[ ] Vomiting	[ ] Pruritus  		  [ ] Weakness		[ ] Numbness	[ ] Other:      PT/INR - ( 09 Apr 2018 03:20 )   PT: 10.8 SEC;   INR: 0.97     PTT - ( 09 Apr 2018 03:20 )  PTT:20.5 SEC                            11.9   7.19  )-----------( 174      ( 09 Apr 2018 03:20 )             36.6       04-09    136  |  98  |  6<L>  ----------------------------<  102<H>  4.2   |  26  |  0.47<L>    Ca    8.4      09 Apr 2018 03:20  Phos  2.4     04-08  Mg     1.8     04-09    TPro  6.3  /  Alb  3.2<L>  /  TBili  0.3  /  DBili  x   /  AST  26  /  ALT  18  /  AlkPhos  47  04-09      ASSESSMENT/ PLAN    Therapy to  be:	[] Continue   [x] Discontinued   [x] Change to prn Analgesics    Documentation and Verification of current medications:  [X] Done	[ ] Not done, not eligible  [ ] Not done, reason not given    Comments:  Chest tube now removed by CT Surgery team  Tylenol q6hrs x 2 days  Oxycodone IR PRN analgesia.

## 2018-04-09 NOTE — PROGRESS NOTE ADULT - SUBJECTIVE AND OBJECTIVE BOX
SANTIAGO MAURICIO                     MRN-[id number]    HPI:  74 yo F PMHx Afib with multiple ablations in the past at [facility] (4 years ago and in Nov 2017) and cardioversion (Dec 2017) sent from pre-surgical testing for rapid afib and palpitations.   Pt recently found to have a long nodule that is scheduled for left VAT, left upper lobe wedge resection on Friday (3/6/18). At pre-surgical testing today, she felt very anxious and palpitations, described as "heart racing." Upon checking her heart rate she was @ 160's bpm and was sent to [facility] Ed for evaluation. Pt denies chest pain, sob, diaphoresis, nausea, vomiting or abdominal pain.     In ED she found to have Rapid Afib @ 167bpm and was given multiple doses of IV Cardizem with partial rate control.   On admission: she in Afib @ 114 bpm. Pt admits to drinking 3-4 glasses of wine daily. However, last intake of alcohol was on Easter Sunday (2 days ago) where she drank 1 bottle of wine. (03 Apr 2018 14:05)    Procedure: 04/06/2018  VATS Lingula sparing left upper lobectomy  POD # : 3    Issues:  Lung nodule  post op pain  A fib/flutter w RVR on cardizem drip  Alcohol abuse  Hypertension  Dyslipidemia  Depression        PAST MEDICAL & SURGICAL HISTORY:  Alcohol abuse  Hypertension  Dyslipidemia  Depression  Atrial fibrillation  Arthritis  Anxiety  History of cardioversion: 12/2017  H/O prior ablation treatment: cardiac 11/2017            VITAL SIGNS:  Vital Signs Last 24 Hrs  T(C): 36.8 (09 Apr 2018 04:00), Max: 36.9 (09 Apr 2018 00:00)  T(F): 98.2 (09 Apr 2018 04:00), Max: 98.4 (09 Apr 2018 00:00)  HR: 97 (09 Apr 2018 06:00) (86 - 160)  BP: 109/73 (09 Apr 2018 06:00) (87/70 - 116/95)  BP(mean): 80 (09 Apr 2018 06:00) (61 - 100)  RR: 18 (09 Apr 2018 06:00) (16 - 33)  SpO2: 98% (09 Apr 2018 06:00) (91% - 100%)    I/Os:   I&O's Detail    07 Apr 2018 07:01  -  08 Apr 2018 07:00  --------------------------------------------------------  IN:    IV PiggyBack: 250 mL    Lactated Ringers IV Bolus: 250 mL    lactated ringers.: 810 mL    Oral Fluid: 820 mL  Total IN: 2130 mL    OUT:    Chest Tube: 100 mL    Voided: 1300 mL  Total OUT: 1400 mL    Total NET: 730 mL      08 Apr 2018 07:01  -  09 Apr 2018 06:29  --------------------------------------------------------  IN:    diltiazem Infusion: 210 mL    IV PiggyBack: 100 mL    Lactated Ringers IV Bolus: 250 mL    lactated ringers.: 690 mL    Oral Fluid: 960 mL  Total IN: 2210 mL    OUT:    Chest Tube: 140 mL    Voided: 2450 mL  Total OUT: 2590 mL    Total NET: -380 mL          CAPILLARY BLOOD GLUCOSE          =======================MEDICATIONS===================  MEDICATIONS  (STANDING):  diltiazem Infusion 10 mG/Hr (10 mL/Hr) IV Continuous <Continuous>  diltiazem Injectable 10 milliGRAM(s) IV Push once  docusate sodium 100 milliGRAM(s) Oral three times a day  heparin  Injectable 5000 Unit(s) SubCutaneous every 8 hours  HYDROmorphone PCA (1 mG/mL) 30 milliLiter(s) PCA Continuous PCA Continuous  lactated ringers. 1000 milliLiter(s) (30 mL/Hr) IV Continuous <Continuous>  pantoprazole    Tablet 40 milliGRAM(s) Oral before breakfast  venlafaxine XR. 150 milliGRAM(s) Oral daily    MEDICATIONS  (PRN):  ALPRAZolam 1 milliGRAM(s) Oral three times a day PRN anxiety  HYDROmorphone PCA (1 mG/mL) Rescue Clinician Bolus 0.5 milliGRAM(s) IV Push every 15 minutes PRN for Pain Scale GREATER THAN 6  LORazepam   Injectable 2 milliGRAM(s) IV Push every 1 hour PRN Symptom-triggered: each CIWA -Ar score 8 or GREATER  naloxone Injectable 0.1 milliGRAM(s) IV Push every 3 minutes PRN For ANY of the following changes in patient status:  A. RR LESS THAN 10 breaths per minute, B. Oxygen saturation LESS THAN 90%, C. Sedation score of 6  ondansetron Injectable 4 milliGRAM(s) IV Push every 6 hours PRN Nausea        ==============PHYSICAL EXAM============================  General:                         Awake, alert, GCS 15 not in any distress, in chair  Neuro:                            Moving all extremities to commands. No acute change from baseline exam.	  Respiratory:	Lungs clear to auscultation bilaterally. Good aeration.                                         Chest tube to suction.  CV:		Irregular rate and rhythm, A fib w RVR, HR mostly in the 's, rate controlled                                          Distal pulses present.  Abdomen:	                     Soft, non-distended. Bowel sounds present   Skin:		No rash.  Extremities:	Warm, no cyanosis or edema.  Palpable pulses  ============================LABS=========================                        11.9   7.19  )-----------( 174      ( 09 Apr 2018 03:20 )             36.6     04-09    136  |  98  |  6<L>  ----------------------------<  102<H>  4.2   |  26  |  0.47<L>    Ca    8.4      09 Apr 2018 03:20  Phos  2.4     04-08  Mg     1.8     04-09    TPro  6.3  /  Alb  3.2<L>  /  TBili  0.3  /  DBili  x   /  AST  26  /  ALT  18  /  AlkPhos  47  04-09      ====================== NEUROLOGY=====================  Pain control with PCA / Tylenol IV   ==================== RESPIRATORY======================  Pt is on 2 L nasal canula  CXR/CT chest:  Small L PTX.   Comfortable, not in any distress.  Using incentive spirometry  Monitor chest tube output  Chest tube to suction CT chest showed small PTX on L.    Continue bronchodilators, pulmonary toilet    ====================CARDIOVASCULAR==================  Continue hemodynamic monitoring.  Not on any pressors  PAF, W RVR, on Cardizem drip, titrate to rate control Cardiology to f/u  Restart Xarelto today    ===================== RENAL =========================  Continue LR  IVF  Monitor I/Os and electrolytes      ==================== GASTROINTESTINAL===================  On regular diet, tolerating  Continue GI prophylaxis with Protonix  Continue Zofran / Reglan for nausea - PRN	    =======================    ENDOCRIN  =====================  Glycemic monitoring  F/S with coverage  ===================HEMATOLOGIC/ONC ===================  Monitor chest tube output. No signs of active bleeding.   Follow CBC in AM  DVT prophylaxis with SCD, sc Heparin    ========================INFECTIOUS DISEASE================  No signs of infection. Monitor for fever / leukocytosis.  All surgical incision / chest tube  sites look clean        Pertinent clinical, laboratory, radiographic, hemodynamic, echocardiographic, respiratory data, microbiologic data and chart were reviewed and analyzed frequently throughout the course of the day and night. GI and DVT prophylaxis, glycemic control, head of bed elevation and skin care issues were addressed.  Patient seen, examined and plan discussed with CT Surgery / CTICU team during rounds.              Nola Conn DO, FACEP

## 2018-04-09 NOTE — PROGRESS NOTE ADULT - SUBJECTIVE AND OBJECTIVE BOX
_________________________________________________________________________________________  ========>>  M E D I C A L   A T T E N D I N G    F O L L O W  U P  N O T E  <<=========  -----------------------------------------------------------------------------------------------------    - Patient seen and examined by me approximately thirty minutes ago.   - In summary, patient is a 73y year old woman who originally presented with tachycardia   - Patient doing ok post op, comfortable, pain improved, chest tube removed      pt remains in Afib with occasional RVR    ==================>> REVIEW OF SYSTEM <<=================    GEN: no fever, no chills, pain as above  RESP: no SOB, no cough, no sputum  CVS: no chest pain, no palpitations, no edema  GI: no abdominal pain, no nausea, no constipation, no diarrhea  : no dysuria, no frequency, no hematuria  Neuro: no headache, no dizziness  Derm : no itching, no rash    ==================>> PHYSICAL EXAM <<=================    GEN: A&O X 3 , NAD , comfortable in bed  HEENT: NCAT, PERRL, MMM, hearing intact  Neck: supple , no JVD  CVS: S1S2 , Irregular , No M/R/G appreciated  PULM: CTA B/L,  no W/R/R appreciated  ABD.: soft. non tender, non distended,  bowel sounds present  Extrem: intact pulses , no edema   PSYCH : normal mood,  not anxious       ==================>> MEDICATIONS <<====================    acetaminophen   Tablet. 650 milliGRAM(s) Oral every 6 hours  diltiazem Infusion 10 mG/Hr IV Continuous <Continuous>  docusate sodium 100 milliGRAM(s) Oral three times a day  heparin  Injectable 5000 Unit(s) SubCutaneous every 8 hours  metoprolol tartrate 12.5 milliGRAM(s) Oral two times a day  pantoprazole    Tablet 40 milliGRAM(s) Oral before breakfast  rivaroxaban 20 milliGRAM(s) Oral every 24 hours  venlafaxine XR. 150 milliGRAM(s) Oral daily    MEDICATIONS  (PRN):  ALPRAZolam 1 milliGRAM(s) Oral three times a day PRN anxiety  LORazepam   Injectable 2 milliGRAM(s) IV Push every 1 hour PRN Symptom-triggered: each CIWA -Ar score 8 or GREATER  naloxone Injectable 0.1 milliGRAM(s) IV Push every 3 minutes PRN For ANY of the following changes in patient status:  A. RR LESS THAN 10 breaths per minute, B. Oxygen saturation LESS THAN 90%, C. Sedation score of 6  ondansetron Injectable 4 milliGRAM(s) IV Push every 6 hours PRN Nausea  oxyCODONE    IR 5 milliGRAM(s) Oral every 3 hours PRN Moderate Pain (4 - 6)  oxyCODONE    IR 10 milliGRAM(s) Oral every 3 hours PRN Severe Pain (7 - 10)    ==================>> VITAL SIGNS <<==================    Vital Signs Last 24 Hrs  T(C): 36.8 (04-09-18 @ 12:00)  T(F): 98.2 (04-09-18 @ 12:00), Max: 98.4 (04-09-18 @ 00:00)  HR: 88 (04-09-18 @ 13:00) (86 - 160)  BP: 100/67 (04-09-18 @ 13:00)  BP(mean): 75 (04-09-18 @ 13:00) (59 - 98)  RR: 26 (04-09-18 @ 13:00) (10 - 26)  SpO2: 96% (04-09-18 @ 13:00) (91% - 100%)       ==================>> LAB AND IMAGING <<==================                        11.9   7.19  )-----------( 174      ( 09 Apr 2018 03:20 )             36.6        04-09    136  |  98  |  6<L>  ----------------------------<  102<H>  4.2   |  26  |  0.47<L>    Ca    8.4      09 Apr 2018 03:20  Phos  2.4     04-08  Mg     1.8     04-09    TPro  6.3  /  Alb  3.2<L>  /  TBili  0.3  /  DBili  x   /  AST  26  /  ALT  18  /  AlkPhos  47  04-09    ___________________________________________________________________________________  ===============>>  A S S E S S M E N T   A N D   P L A N <<===============  ------------------------------------------------------------------------------------------    · Assessment		      72 yo F PMHx Afib with multiple ablations in the past at [facility] (4 years ago and in Nov 2017) and cardioversion (Dec 2017) sent from pre-surgical testing for palpitations, found to be in Rapid Afib @ 167bpm, admitted to tele for Rapid Afib, r/o ACS and Alcohol Withdrawal.  +Rapid Afib  +Alcohol Withdrawal  + lung mass    Problem/Plan - 1:  ·  Problem: Atrial fibrillation with RVR,   tele monitor   EP and cardio f/u  Cardizem   encourage PO hydration.   AC as able post op     Problem/Plan - 2:  ·  Problem: Alcohol withdrawal, overall improved   Monitor VS as per routine  continue Venlafaxine for depression history   I have had several discussions with patient re life style modification and alcohol cessation and all questions have been answered.    Problem/Plan - 3:  ·  Problem: Lung mass / nodule  doing well post surgical resection   close telemetry monitoring Emily-Op   follow pathology  CTU f/u appreciated    -GI/DVT Prophylaxis.  - PT, OOB    --------------------------------------------  Case discussed with pt  Education given on findings and plan of care  ___________________________  H. MAKI Silva.  Pager: [phone]

## 2018-04-09 NOTE — PROGRESS NOTE ADULT - SUBJECTIVE AND OBJECTIVE BOX
Anesthesia Pain Management Service- Attending Addendum    SUBJECTIVE: Patient's pain control adequate    Therapy:	  [ X] IV PCA	   [ ] Epidural           [ ] s/p Spinal Opoid              [ ] Postpartum infusion	  [ ] Patient controlled regional anesthesia (PCRA)    [ ] prn Analgesics    Allergies    No Known Allergies    Intolerances      MEDICATIONS  (STANDING):  acetaminophen   Tablet. 650 milliGRAM(s) Oral every 6 hours  diltiazem Infusion 10 mG/Hr (10 mL/Hr) IV Continuous <Continuous>  diltiazem Injectable 10 milliGRAM(s) IV Push once  docusate sodium 100 milliGRAM(s) Oral three times a day  heparin  Injectable 5000 Unit(s) SubCutaneous every 8 hours  lactated ringers. 1000 milliLiter(s) (30 mL/Hr) IV Continuous <Continuous>  metoprolol tartrate 12.5 milliGRAM(s) Oral two times a day  pantoprazole    Tablet 40 milliGRAM(s) Oral before breakfast  rivaroxaban 20 milliGRAM(s) Oral every 24 hours  venlafaxine XR. 150 milliGRAM(s) Oral daily    MEDICATIONS  (PRN):  ALPRAZolam 1 milliGRAM(s) Oral three times a day PRN anxiety  LORazepam   Injectable 2 milliGRAM(s) IV Push every 1 hour PRN Symptom-triggered: each CIWA -Ar score 8 or GREATER  naloxone Injectable 0.1 milliGRAM(s) IV Push every 3 minutes PRN For ANY of the following changes in patient status:  A. RR LESS THAN 10 breaths per minute, B. Oxygen saturation LESS THAN 90%, C. Sedation score of 6  ondansetron Injectable 4 milliGRAM(s) IV Push every 6 hours PRN Nausea  oxyCODONE    IR 5 milliGRAM(s) Oral every 3 hours PRN Moderate Pain (4 - 6)  oxyCODONE    IR 10 milliGRAM(s) Oral every 3 hours PRN Severe Pain (7 - 10)      OBJECTIVE:   [X] No new signs     [ ] Other:    Side Effects:  [X ] None			[ ] Other:      ASSESSMENT/PLAN  -Discontinue current therapy    [ ] Therapy changed to:    [ ] IV PCA       [ ] Epidural     [ X] prn Analgesics     Comments: Pain management per primary team, APS to sign off

## 2018-04-09 NOTE — PROGRESS NOTE ADULT - SUBJECTIVE AND OBJECTIVE BOX
Subjective: Patient seen and examined. No new events except as noted.     SUBJECTIVE/ROS:    No chest pain, dyspnea, palpitation, or dizziness.     MEDICATIONS:  MEDICATIONS  (STANDING):  diltiazem Infusion 10 mG/Hr (10 mL/Hr) IV Continuous <Continuous>  diltiazem Injectable 10 milliGRAM(s) IV Push once  docusate sodium 100 milliGRAM(s) Oral three times a day  heparin  Injectable 5000 Unit(s) SubCutaneous every 8 hours  HYDROmorphone PCA (1 mG/mL) 30 milliLiter(s) PCA Continuous PCA Continuous  lactated ringers. 1000 milliLiter(s) (30 mL/Hr) IV Continuous <Continuous>  pantoprazole    Tablet 40 milliGRAM(s) Oral before breakfast  venlafaxine XR. 150 milliGRAM(s) Oral daily      PHYSICAL EXAM:  T(C): 36.8 (04-09-18 @ 04:00), Max: 36.9 (04-09-18 @ 00:00)  HR: 90 (04-09-18 @ 07:00) (86 - 160)  BP: 105/77 (04-09-18 @ 07:00) (87/70 - 116/95)  RR: 25 (04-09-18 @ 07:00) (16 - 33)  SpO2: 97% (04-09-18 @ 07:00) (91% - 100%)  Wt(kg): --  I&O's Summary    08 Apr 2018 07:01  -  09 Apr 2018 07:00  --------------------------------------------------------  IN: 2210 mL / OUT: 2590 mL / NET: -380 mL    09 Apr 2018 07:01  -  09 Apr 2018 07:49  --------------------------------------------------------  IN: 40 mL / OUT: 0 mL / NET: 40 mL          Appearance: Normal	  HEENT:   Normal oral mucosa, PERRL, EOMI	  Cardiovascular: Normal S1 S2,    Murmur:   Neck: JVP normal  Respiratory: Lungs clear to auscultation  Gastrointestinal:  Soft, Non-tender, + BS	  Skin: normal   Neuro: No gross deficits.   Psychiatry:  Mood & affect appropriate  Ext: No edema      LABS/DATA:    CARDIAC MARKERS:                                11.9   7.19  )-----------( 174      ( 09 Apr 2018 03:20 )             36.6     04-09    136  |  98  |  6<L>  ----------------------------<  102<H>  4.2   |  26  |  0.47<L>    Ca    8.4      09 Apr 2018 03:20  Phos  2.4     04-08  Mg     1.8     04-09    TPro  6.3  /  Alb  3.2<L>  /  TBili  0.3  /  DBili  x   /  AST  26  /  ALT  18  /  AlkPhos  47  04-09    proBNP:   Lipid Profile:   HgA1c:   TSH:     TELE:  EKG:

## 2018-04-10 LAB
BUN SERPL-MCNC: 7 MG/DL — SIGNIFICANT CHANGE UP (ref 7–23)
CA-I BLD-SCNC: 1.1 MMOL/L — SIGNIFICANT CHANGE UP (ref 1.03–1.23)
CALCIUM SERPL-MCNC: 8.8 MG/DL — SIGNIFICANT CHANGE UP (ref 8.4–10.5)
CHLORIDE SERPL-SCNC: 98 MMOL/L — SIGNIFICANT CHANGE UP (ref 98–107)
CO2 SERPL-SCNC: 29 MMOL/L — SIGNIFICANT CHANGE UP (ref 22–31)
CREAT SERPL-MCNC: 0.61 MG/DL — SIGNIFICANT CHANGE UP (ref 0.5–1.3)
GLUCOSE SERPL-MCNC: 102 MG/DL — HIGH (ref 70–99)
HCT VFR BLD CALC: 35.5 % — SIGNIFICANT CHANGE UP (ref 34.5–45)
HGB BLD-MCNC: 11.6 G/DL — SIGNIFICANT CHANGE UP (ref 11.5–15.5)
MAGNESIUM SERPL-MCNC: 1.8 MG/DL — SIGNIFICANT CHANGE UP (ref 1.6–2.6)
MCHC RBC-ENTMCNC: 32.7 % — SIGNIFICANT CHANGE UP (ref 32–36)
MCHC RBC-ENTMCNC: 33.8 PG — SIGNIFICANT CHANGE UP (ref 27–34)
MCV RBC AUTO: 103.5 FL — HIGH (ref 80–100)
NRBC # FLD: 0 — SIGNIFICANT CHANGE UP
PHOSPHATE SERPL-MCNC: 3.2 MG/DL — SIGNIFICANT CHANGE UP (ref 2.5–4.5)
PLATELET # BLD AUTO: 198 K/UL — SIGNIFICANT CHANGE UP (ref 150–400)
PMV BLD: 10.6 FL — SIGNIFICANT CHANGE UP (ref 7–13)
POTASSIUM SERPL-MCNC: 4.2 MMOL/L — SIGNIFICANT CHANGE UP (ref 3.5–5.3)
POTASSIUM SERPL-SCNC: 4.2 MMOL/L — SIGNIFICANT CHANGE UP (ref 3.5–5.3)
RBC # BLD: 3.43 M/UL — LOW (ref 3.8–5.2)
RBC # FLD: 12.8 % — SIGNIFICANT CHANGE UP (ref 10.3–14.5)
SODIUM SERPL-SCNC: 137 MMOL/L — SIGNIFICANT CHANGE UP (ref 135–145)
WBC # BLD: 6.18 K/UL — SIGNIFICANT CHANGE UP (ref 3.8–10.5)
WBC # FLD AUTO: 6.18 K/UL — SIGNIFICANT CHANGE UP (ref 3.8–10.5)

## 2018-04-10 PROCEDURE — 71045 X-RAY EXAM CHEST 1 VIEW: CPT | Mod: 26

## 2018-04-10 PROCEDURE — 99233 SBSQ HOSP IP/OBS HIGH 50: CPT

## 2018-04-10 RX ORDER — METOPROLOL TARTRATE 50 MG
125 TABLET ORAL EVERY 6 HOURS
Qty: 0 | Refills: 0 | Status: DISCONTINUED | OUTPATIENT
Start: 2018-04-10 | End: 2018-04-10

## 2018-04-10 RX ORDER — MAGNESIUM SULFATE 500 MG/ML
2 VIAL (ML) INJECTION ONCE
Qty: 0 | Refills: 0 | Status: COMPLETED | OUTPATIENT
Start: 2018-04-10 | End: 2018-04-10

## 2018-04-10 RX ORDER — METOPROLOL TARTRATE 50 MG
12.5 TABLET ORAL EVERY 6 HOURS
Qty: 0 | Refills: 0 | Status: DISCONTINUED | OUTPATIENT
Start: 2018-04-10 | End: 2018-04-12

## 2018-04-10 RX ADMIN — Medication 10 MG/HR: at 09:03

## 2018-04-10 RX ADMIN — Medication 150 MILLIGRAM(S): at 11:18

## 2018-04-10 RX ADMIN — OXYCODONE HYDROCHLORIDE 5 MILLIGRAM(S): 5 TABLET ORAL at 01:45

## 2018-04-10 RX ADMIN — Medication 12.5 MILLIGRAM(S): at 05:00

## 2018-04-10 RX ADMIN — RIVAROXABAN 20 MILLIGRAM(S): KIT at 17:43

## 2018-04-10 RX ADMIN — Medication 12.5 MILLIGRAM(S): at 23:10

## 2018-04-10 RX ADMIN — Medication 1 MILLIGRAM(S): at 20:44

## 2018-04-10 RX ADMIN — Medication 100 MILLIGRAM(S): at 05:00

## 2018-04-10 RX ADMIN — Medication 100 MILLIGRAM(S): at 21:01

## 2018-04-10 RX ADMIN — Medication 10 MG/HR: at 20:44

## 2018-04-10 RX ADMIN — PANTOPRAZOLE SODIUM 40 MILLIGRAM(S): 20 TABLET, DELAYED RELEASE ORAL at 05:00

## 2018-04-10 RX ADMIN — Medication 50 GRAM(S): at 03:52

## 2018-04-10 RX ADMIN — OXYCODONE HYDROCHLORIDE 10 MILLIGRAM(S): 5 TABLET ORAL at 18:20

## 2018-04-10 RX ADMIN — OXYCODONE HYDROCHLORIDE 10 MILLIGRAM(S): 5 TABLET ORAL at 17:53

## 2018-04-10 RX ADMIN — OXYCODONE HYDROCHLORIDE 5 MILLIGRAM(S): 5 TABLET ORAL at 02:15

## 2018-04-10 RX ADMIN — OXYCODONE HYDROCHLORIDE 5 MILLIGRAM(S): 5 TABLET ORAL at 08:00

## 2018-04-10 RX ADMIN — Medication 100 MILLIGRAM(S): at 14:35

## 2018-04-10 RX ADMIN — OXYCODONE HYDROCHLORIDE 5 MILLIGRAM(S): 5 TABLET ORAL at 07:30

## 2018-04-10 RX ADMIN — Medication 12.5 MILLIGRAM(S): at 17:43

## 2018-04-10 RX ADMIN — Medication 1 MILLIGRAM(S): at 13:25

## 2018-04-10 NOTE — PROGRESS NOTE ADULT - SUBJECTIVE AND OBJECTIVE BOX
Subjective: Patient seen and examined. No new events except as noted.     SUBJECTIVE/ROS:    No chest pain, dyspnea, palpitation, or dizziness.     MEDICATIONS:  MEDICATIONS  (STANDING):  acetaminophen   Tablet. 650 milliGRAM(s) Oral every 6 hours  diltiazem    Tablet 30 milliGRAM(s) Oral every 6 hours  diltiazem Infusion 10 mG/Hr (10 mL/Hr) IV Continuous <Continuous>  docusate sodium 100 milliGRAM(s) Oral three times a day  metoprolol tartrate 12.5 milliGRAM(s) Oral two times a day  pantoprazole    Tablet 40 milliGRAM(s) Oral before breakfast  rivaroxaban 20 milliGRAM(s) Oral every 24 hours  venlafaxine XR. 150 milliGRAM(s) Oral daily      PHYSICAL EXAM:  T(C): 36.2 (04-10-18 @ 12:00), Max: 37 (04-09-18 @ 20:00)  HR: 89 (04-10-18 @ 14:00) (83 - 91)  BP: 106/70 (04-10-18 @ 14:00) (96/67 - 127/77)  RR: 16 (04-10-18 @ 14:00) (9 - 35)  SpO2: 100% (04-10-18 @ 14:00) (94% - 100%)  Wt(kg): --  I&O's Summary    09 Apr 2018 07:01  -  10 Apr 2018 07:00  --------------------------------------------------------  IN: 1430 mL / OUT: 2870 mL / NET: -1440 mL    10 Apr 2018 07:01  -  10 Apr 2018 15:36  --------------------------------------------------------  IN: 1000 mL / OUT: 250 mL / NET: 750 mL          Appearance: Normal	  HEENT:   Normal oral mucosa, PERRL, EOMI	  Cardiovascular: Normal S1 S2,    Murmur:   Neck: JVP normal  Respiratory: Lungs clear to auscultation  Gastrointestinal:  Soft, Non-tender, + BS	  Skin: normal   Neuro: No gross deficits.   Psychiatry:  Mood & affect appropriate  Ext: No edema      LABS/DATA:    CARDIAC MARKERS:                                11.6   6.18  )-----------( 198      ( 10 Apr 2018 02:10 )             35.5     04-10    137  |  98  |  7   ----------------------------<  102<H>  4.2   |  29  |  0.61    Ca    8.8      10 Apr 2018 02:12  Phos  3.2     04-10  Mg     1.8     04-10    TPro  6.3  /  Alb  3.2<L>  /  TBili  0.3  /  DBili  x   /  AST  26  /  ALT  18  /  AlkPhos  47  04-09    proBNP:   Lipid Profile:   HgA1c:   TSH:     TELE:  EKG:

## 2018-04-10 NOTE — PROGRESS NOTE ADULT - ASSESSMENT
afib with RVR  stable now in sinus   cardizem many change to PO at 60 q6  on a/c    etoh withdrawal  stable

## 2018-04-10 NOTE — PROGRESS NOTE ADULT - SUBJECTIVE AND OBJECTIVE BOX
_________________________________________________________________________________________  ========>>  M E D I C A L   A T T E N D I N G    F O L L O W  U P  N O T E  <<=========  -----------------------------------------------------------------------------------------------------    - Patient seen and examined by me earlier today.   - In summary, patient is a 73y year old woman who originally presented with tachycardia   - Patient doing ok post op, comfortable, pain improved.    ==================>> REVIEW OF SYSTEM <<=================    GEN: no fever, no chills, pain better  RESP: no SOB, no cough, no sputum  CVS: no chest pain, + some palpitations with ambulation, no edema  GI: no abdominal pain, no nausea, no constipation, no diarrhea  : no dysuria, no frequency, no hematuria  Neuro: no headache, no dizziness  Derm : no itching, no rash    ==================>> PHYSICAL EXAM <<=================    GEN: A&O X 3 , NAD , comfortable in chair  HEENT: NCAT, PERRL, MMM, hearing intact  Neck: supple , no JVD  CVS: S1S2 , Irregular , No M/R/G appreciated  PULM: CTA B/L,  no W/R/R appreciated  ABD.: soft. non tender, non distended,  bowel sounds present  Extrem: intact pulses , no edema   PSYCH : normal mood,  not anxious        ==================>> MEDICATIONS <<====================    acetaminophen   Tablet. 650 milliGRAM(s) Oral every 6 hours  diltiazem    Tablet 30 milliGRAM(s) Oral every 6 hours  diltiazem Infusion 10 mG/Hr IV Continuous <Continuous>  docusate sodium 100 milliGRAM(s) Oral three times a day  metoprolol tartrate 12.5 milliGRAM(s) Oral two times a day  pantoprazole    Tablet 40 milliGRAM(s) Oral before breakfast  rivaroxaban 20 milliGRAM(s) Oral every 24 hours  venlafaxine XR. 150 milliGRAM(s) Oral daily    MEDICATIONS  (PRN):  ALPRAZolam 1 milliGRAM(s) Oral three times a day PRN anxiety  LORazepam   Injectable 2 milliGRAM(s) IV Push every 1 hour PRN Symptom-triggered: each CIWA -Ar score 8 or GREATER  naloxone Injectable 0.1 milliGRAM(s) IV Push every 3 minutes PRN For ANY of the following changes in patient status:  A. RR LESS THAN 10 breaths per minute, B. Oxygen saturation LESS THAN 90%, C. Sedation score of 6  ondansetron Injectable 4 milliGRAM(s) IV Push every 6 hours PRN Nausea  oxyCODONE    IR 5 milliGRAM(s) Oral every 3 hours PRN Moderate Pain (4 - 6)  oxyCODONE    IR 10 milliGRAM(s) Oral every 3 hours PRN Severe Pain (7 - 10)    ==================>> VITAL SIGNS <<==================    Vital Signs Last 24 Hrs  T(C): 36.2 (04-10-18 @ 12:00)  T(F): 97.2 (04-10-18 @ 12:00), Max: 98.6 (04-09-18 @ 20:00)  HR: 89 (04-10-18 @ 14:00) (83 - 91)  BP: 106/70 (04-10-18 @ 14:00)  BP(mean): 78 (04-10-18 @ 14:00) (69 - 92)  RR: 16 (04-10-18 @ 14:00) (9 - 35)  SpO2: 100% (04-10-18 @ 14:00) (94% - 100%)       ==================>> LAB AND IMAGING <<==================                        11.6   6.18  )-----------( 198      ( 10 Apr 2018 02:10 )             35.5        04-10    137  |  98  |  7   ----------------------------<  102<H>  4.2   |  29  |  0.61    Ca    8.8      10 Apr 2018 02:12  Phos  3.2     04-10  Mg     1.8     04-10    TPro  6.3  /  Alb  3.2<L>  /  TBili  0.3  /  DBili  x   /  AST  26  /  ALT  18  /  AlkPhos  47  04-09    ___________________________________________________________________________________  ===============>>  A S S E S S M E N T   A N D   P L A N <<===============  ------------------------------------------------------------------------------------------    · Assessment		      74 yo F PMHx Afib with multiple ablations in the past at [facility] (4 years ago and in Nov 2017) and cardioversion (Dec 2017) sent from pre-surgical testing for palpitations, found to be in Rapid Afib @ 167bpm, admitted to tele for Rapid Afib, r/o ACS and Alcohol Withdrawal.  +Rapid Afib  +Alcohol Withdrawal  + lung mass    Problem/Plan - 1:  ·  Problem: Atrial fibrillation with RVR,   tele monitor   EP and cardio f/u  Cardizem  >> PO   encourage PO hydration.   AC on, monitor     Problem/Plan - 2:  ·  Problem: Alcohol withdrawal, overall improved   Monitor VS as per routine  continue Venlafaxine for depression history   I have had several discussions with patient re life style modification and alcohol cessation and all questions have been answered.    Problem/Plan - 3:  ·  Problem: Lung mass / nodule  doing well post surgical resection   close telemetry monitoring Emily-Op   follow pathology  CTU f/u appreciated    -GI/DVT Prophylaxis.  - PT, OOB    --------------------------------------------  Case discussed with pt  Education given on findings and plan of care  ___________________________  H. MAKI Silva.  Pager: [phone]

## 2018-04-10 NOTE — PROGRESS NOTE ADULT - SUBJECTIVE AND OBJECTIVE BOX
Patient is a 73y old  Female who presents with a chief complaint of palpitations (03 Apr 2018 14:05)  POD #4 s/p L upper lobectomy.  Back from SR to AF.  Maintained on Diltiazem gtt and oral Diltiazem /BB for rate control.  Denies palpitations.  +L chest wound site pain with deep breathing.  Resumed Xarelto yesterday without bleeding.     PAST MEDICAL & SURGICAL HISTORY:  Alcohol abuse  Hypertension  Dyslipidemia  Depression  Atrial fibrillation  Arthritis  Anxiety  History of cardioversion: 12/2017  H/O prior ablation treatment: cardiac 11/2017      MEDICATIONS  (STANDING):  acetaminophen   Tablet. 650 milliGRAM(s) Oral every 6 hours  diltiazem    Tablet 30 milliGRAM(s) Oral every 6 hours  diltiazem Infusion 10 mG/Hr (10 mL/Hr) IV Continuous <Continuous>  docusate sodium 100 milliGRAM(s) Oral three times a day  metoprolol tartrate 12.5 milliGRAM(s) Oral two times a day  pantoprazole    Tablet 40 milliGRAM(s) Oral before breakfast  rivaroxaban 20 milliGRAM(s) Oral every 24 hours  venlafaxine XR. 150 milliGRAM(s) Oral daily    MEDICATIONS  (PRN):  ALPRAZolam 1 milliGRAM(s) Oral three times a day PRN anxiety  LORazepam   Injectable 2 milliGRAM(s) IV Push every 1 hour PRN Symptom-triggered: each CIWA -Ar score 8 or GREATER  naloxone Injectable 0.1 milliGRAM(s) IV Push every 3 minutes PRN For ANY of the following changes in patient status:  A. RR LESS THAN 10 breaths per minute, B. Oxygen saturation LESS THAN 90%, C. Sedation score of 6  ondansetron Injectable 4 milliGRAM(s) IV Push every 6 hours PRN Nausea  oxyCODONE    IR 5 milliGRAM(s) Oral every 3 hours PRN Moderate Pain (4 - 6)  oxyCODONE    IR 10 milliGRAM(s) Oral every 3 hours PRN Severe Pain (7 - 10)            Vital Signs Last 24 Hrs  T(C): 36.2 (10 Apr 2018 12:00), Max: 37 (09 Apr 2018 20:00)  T(F): 97.2 (10 Apr 2018 12:00), Max: 98.6 (09 Apr 2018 20:00)  HR: 89 (10 Apr 2018 14:00) (83 - 91)  BP: 106/70 (10 Apr 2018 14:00) (96/67 - 127/77)  BP(mean): 78 (10 Apr 2018 14:00) (69 - 92)  RR: 16 (10 Apr 2018 14:00) (9 - 35)  SpO2: 100% (10 Apr 2018 14:00) (94% - 100%)            INTERPRETATION OF TELEMETRY:  AFib with HR 's at rest; HR up to 130's bpm with ambulation    ECG:        LABS:                        11.6   6.18  )-----------( 198      ( 10 Apr 2018 02:10 )             35.5     04-10    137  |  98  |  7   ----------------------------<  102<H>  4.2   |  29  |  0.61    Ca    8.8      10 Apr 2018 02:12  Phos  3.2     04-10  Mg     1.8     04-10    TPro  6.3  /  Alb  3.2<L>  /  TBili  0.3  /  DBili  x   /  AST  26  /  ALT  18  /  AlkPhos  47  04-09        PT/INR - ( 09 Apr 2018 03:20 )   PT: 10.8 SEC;   INR: 0.97          PTT - ( 09 Apr 2018 03:20 )  PTT:20.5 SEC      BNP  RADIOLOGY & ADDITIONAL STUDIES:      PHYSICAL EXAM:    GENERAL: In no apparent distress, well nourished, and hydrated.  NECK: Supple and normal thyroid.  No JVD or carotid bruit.   HEART: S1S2 irregularly irregular; No murmurs, rubs, or gallops.    PULMONARY: Clear to auscultation; diminished L side BS;  No rales, wheezing, or rhonchi bilaterally.  ABDOMEN: Soft, Nontender, Nondistended; Bowel sounds present  EXTREMITIES:  2+ Peripheral Pulses, No clubbing, cyanosis, or edema  NEUROLOGICAL: Grossly nonfocal

## 2018-04-10 NOTE — PROGRESS NOTE ADULT - SUBJECTIVE AND OBJECTIVE BOX
73 F PMH Afib with multiple ablations in the past at [facility] (4 years ago and in Nov 2017) and cardioversion (Dec 2017) sent from pre-surgical testing for rapid afib and palpitations.   Pt recently found to have a lung nodule that is scheduled for left VAT, left upper lobe wedge resection on Friday (3/6/18). At pre-surgical testing today, she felt very anxious and palpitations, described as "heart racing." Upon checking her heart rate she was @ 160's bpm and was sent to [facility] Ed for evaluation. Pt had no chest pain, sob, diaphoresis, nausea, vomiting or abdominal pain.     In ED she found to have Rapid Afib @ 167bpm and was given multiple doses of IV Cardizem with partial rate control.   On admission: she in Afib @ 114 bpm. Pt admits to drinking 3-4 glasses of wine daily. However, last intake of alcohol was on Easter Sunday (2 days ago) where she drank 1 bottle of wine. (03 Apr 2018 14:05)    04/06/2018  VATS Lingula sparing left upper lobectomy    Issues:  A fib/flutter w RVR on cardizem drip  S/P VATS - Lung nodule  post op pain  Alcohol abuse  Hypertension  Dyslipidemia  Depression    Home Medications:   * Patient Currently Takes Medications as of 03-Apr-2018 09:35 documented in Structured Notes  · 	venlafaxine 150 mg oral capsule, extended release: 1 cap(s) orally once a day  · 	losartan-hydroCHLOROthiazide 100 mg-25 mg oral tablet: 1 tab(s) orally once a day  · 	ALPRAZolam 1 mg oral tablet: 1 tab(s) orally 3 times a day, As Needed  · 	pantoprazole 40 mg oral delayed release tablet: 1 tab(s) orally once a day  · 	Multaq 400 mg oral tablet: 1 tab(s) orally 2 times a day  · 	Xarelto 20 mg oral tablet: 1 tab(s) orally once a day (in the evening) last dose 4/3  · 	oxyCODONE-acetaminophen 5 mg-325 mg oral tablet: 1 tab(s) orally every 6 hours, As Needed    MEDICATIONS  (STANDING):  acetaminophen   Tablet. 650 milliGRAM(s) Oral every 6 hours  diltiazem Infusion 10 mG/Hr (10 mL/Hr) IV Continuous <Continuous>  docusate sodium 100 milliGRAM(s) Oral three times a day  metoprolol tartrate 12.5 milliGRAM(s) Oral two times a day  pantoprazole    Tablet 40 milliGRAM(s) Oral before breakfast  rivaroxaban 20 milliGRAM(s) Oral every 24 hours  venlafaxine XR. 150 milliGRAM(s) Oral daily    MEDICATIONS  (PRN):  ALPRAZolam 1 milliGRAM(s) Oral three times a day PRN anxiety  LORazepam   Injectable 2 milliGRAM(s) IV Push every 1 hour PRN Symptom-triggered: each CIWA -Ar score 8 or GREATER  naloxone Injectable 0.1 milliGRAM(s) IV Push every 3 minutes PRN For ANY of the following changes in patient status:  A. RR LESS THAN 10 breaths per minute, B. Oxygen saturation LESS THAN 90%, C. Sedation score of 6  ondansetron Injectable 4 milliGRAM(s) IV Push every 6 hours PRN Nausea  oxyCODONE    IR 5 milliGRAM(s) Oral every 3 hours PRN Moderate Pain (4 - 6)  oxyCODONE    IR 10 milliGRAM(s) Oral every 3 hours PRN Severe Pain (7 - 10)      ICU Vital Signs Last 24 Hrs  T(C): 36.9 (10 Apr 2018 00:00), Max: 37 (09 Apr 2018 20:00)  T(F): 98.4 (10 Apr 2018 00:00), Max: 98.6 (09 Apr 2018 20:00)  HR: 86 (10 Apr 2018 02:00) (83 - 114)  BP: 115/78 (10 Apr 2018 02:00) (92/63 - 124/80)  BP(mean): 86 (10 Apr 2018 02:00) (59 - 90)  RR: 26 (10 Apr 2018 02:00) (10 - 35)  SpO2: 96% (10 Apr 2018 02:00) (93% - 99%)    Physical exam:                           General:            WD WN in NAD  Neuro:              Alert,  no focal deficits	  Respiratory:	Lungs clear to auscultation bilaterally. Good aeration.   CV:		Irregular rate and rhythm, A fib w RVR, HR mostly in the 's, rate controlled Distal pulses present.  Abdomen:	+ Bowel sounds, Soft, non-distended.   Skin:		No rash.  Extremities:	Warm, no cyanosis or edema.  Palpable pulses    I&O's Summary    08 Apr 2018 07:01  -  09 Apr 2018 07:00  --------------------------------------------------------  IN: 2210 mL / OUT: 2590 mL / NET: -380 mL    09 Apr 2018 07:01  -  10 Apr 2018 04:16  --------------------------------------------------------  IN: 1100 mL / OUT: 2370 mL / NET: -1270 mL    Labs:                                                                           11.6   6.18  )-----------( 198      ( 10 Apr 2018 02:10 )             35.5                            04-10    137  |  98  |  7   ----------------------------<  102<H>  4.2   |  29  |  0.61    Ca    8.8      10 Apr 2018 02:12  Phos  3.2     04-10  Mg     1.8     04-10    TPro  6.3  /  Alb  3.2<L>  /  TBili  0.3  /  DBili  x   /  AST  26  /  ALT  18  /  AlkPhos  47  04-09    LIVER FUNCTIONS - ( 09 Apr 2018 03:20 )  Alb: 3.2 g/dL / Pro: 6.3 g/dL / ALK PHOS: 47 u/L / ALT: 18 u/L / AST: 26 u/L / GGT: x                                PT/INR - ( 09 Apr 2018 03:20 )   PT: 10.8 SEC;   INR: 0.97          PTT - ( 09 Apr 2018 03:20 )  PTT:20.5 SEC                 Plan:  73 F PMH Afib with multiple ablations in the past at [facility] (4 years ago and in Nov 2017) and cardioversion (Dec 2017) sent from pre-surgical testing for rapid afib and palpitations.   Pt recently found to have a lung nodule that is scheduled for left VAT, left upper lobe wedge resection on Friday (3/6/18). At pre-surgical testing today, she felt very anxious and palpitations, described as "heart racing." Upon checking her heart rate she was @ 160's bpm and was sent to [facility] Ed for evaluation. Pt had no chest pain, sob, diaphoresis, nausea, vomiting or abdominal pain.     In ED she found to have Rapid Afib @ 167bpm and was given multiple doses of IV Cardizem with partial rate control.   On admission: she in Afib @ 114 bpm. Pt admits to drinking 3-4 glasses of wine daily. However, last intake of alcohol was on Easter Sunday (2 days ago) where she drank 1 bottle of wine. (03 Apr 2018 14:05)  04/06/2018  VATS Lingula sparing left upper lobectomy  Issues:  A fib/flutter w RVR on cardizem drip  S/P VATS - Lung nodule  post op pain  Alcohol abuse  Hypertension  Dyslipidemia  Depression    Neuro  Pain control with PCA / Tylenol IV     Resp  Pt is on 2 L nasal canula  CXR/CT chest:  Small L PTX.   Comfortable, not in any distress.  Using incentive spirometry  Monitor chest tube output  Chest tube to suction CT chest showed small PTX on L.    Continue bronchodilators, pulmonary toilet    CVS  Continue hemodynamic monitoring.  Not on any pressors  PAF, W RVR, on Cardizem drip, titrate to rate control Cardiology to f/u  Xarelto   Continue Lopressor  Convert Cardizem to PO      Renal  Continue LR  IVF  Monitor I/Os and electrolytes      GI    On regular diet, tolerating  Continue GI prophylaxis with Protonix  Continue Zofran / Reglan for nausea - PRN	    Endocrine  Glycemic monitoring  F/S with coverage    Heme/ Onc   SQH & SCDs for VTE prophylaxis  Monitor chest tube output. No signs of active bleeding.   Follow CBCs    Infectious Diseases  No signs of infection. Monitor for fever / leukocytosis.  All surgical incision / chest tube  sites look clean        All available pertinent clinical, laboratory, radiographic, hemodynamic, echocardiographic, respiratory data, microbiologic data and chart were reviewed and analyzed frequently. GI and DVT prophylaxis, glycemic control, head of bed elevation and skin care issues were addressed.  Patient seen, examined and plan discussed with CT Surgery / CTICU team during rounds.    Mikey Renner MD

## 2018-04-10 NOTE — PROGRESS NOTE ADULT - ASSESSMENT
73 year old female who presented with KYARA who has a PMH of HTN, anxiety, depression, HLD, failed AFib with ablation x2 at [facility] on Xarelto, multiple cardioversions now with recently diagnosed with L lung nodule s/p L upper lobectomy resection on 4/6.  Post op with recurrent PAF-rate partially controlled with AV farzad agents (CCB/BB).  Resumed AC Xarelto yesterday.    -Continue rate control with IV Diltiazem/oral CCB/BB  -Continue AC with Xarelto  - will discuss with EP attending regarding rhythm control strategy with AAD's Sotalol and DCCV (likely require CARMEN)  -Continue care pre primary team  -will follow up

## 2018-04-11 ENCOUNTER — TRANSCRIPTION ENCOUNTER (OUTPATIENT)
Age: 73
End: 2018-04-11

## 2018-04-11 LAB
BLD GP AB SCN SERPL QL: NEGATIVE — SIGNIFICANT CHANGE UP
BUN SERPL-MCNC: 7 MG/DL — SIGNIFICANT CHANGE UP (ref 7–23)
CA-I BLD-SCNC: 1.24 MMOL/L — HIGH (ref 1.03–1.23)
CALCIUM SERPL-MCNC: 9 MG/DL — SIGNIFICANT CHANGE UP (ref 8.4–10.5)
CHLORIDE SERPL-SCNC: 100 MMOL/L — SIGNIFICANT CHANGE UP (ref 98–107)
CO2 SERPL-SCNC: 32 MMOL/L — HIGH (ref 22–31)
CREAT SERPL-MCNC: 0.58 MG/DL — SIGNIFICANT CHANGE UP (ref 0.5–1.3)
GLUCOSE SERPL-MCNC: 89 MG/DL — SIGNIFICANT CHANGE UP (ref 70–99)
HCT VFR BLD CALC: 36.3 % — SIGNIFICANT CHANGE UP (ref 34.5–45)
HGB BLD-MCNC: 12 G/DL — SIGNIFICANT CHANGE UP (ref 11.5–15.5)
MAGNESIUM SERPL-MCNC: 1.9 MG/DL — SIGNIFICANT CHANGE UP (ref 1.6–2.6)
MCHC RBC-ENTMCNC: 33.1 % — SIGNIFICANT CHANGE UP (ref 32–36)
MCHC RBC-ENTMCNC: 33.8 PG — SIGNIFICANT CHANGE UP (ref 27–34)
MCV RBC AUTO: 102.3 FL — HIGH (ref 80–100)
NRBC # FLD: 0 — SIGNIFICANT CHANGE UP
PHOSPHATE SERPL-MCNC: 3.2 MG/DL — SIGNIFICANT CHANGE UP (ref 2.5–4.5)
PLATELET # BLD AUTO: 234 K/UL — SIGNIFICANT CHANGE UP (ref 150–400)
PMV BLD: 10.6 FL — SIGNIFICANT CHANGE UP (ref 7–13)
POTASSIUM SERPL-MCNC: 4.3 MMOL/L — SIGNIFICANT CHANGE UP (ref 3.5–5.3)
POTASSIUM SERPL-SCNC: 4.3 MMOL/L — SIGNIFICANT CHANGE UP (ref 3.5–5.3)
RBC # BLD: 3.55 M/UL — LOW (ref 3.8–5.2)
RBC # FLD: 12.7 % — SIGNIFICANT CHANGE UP (ref 10.3–14.5)
RH IG SCN BLD-IMP: POSITIVE — SIGNIFICANT CHANGE UP
SODIUM SERPL-SCNC: 138 MMOL/L — SIGNIFICANT CHANGE UP (ref 135–145)
SURGICAL PATHOLOGY STUDY: SIGNIFICANT CHANGE UP
WBC # BLD: 5.58 K/UL — SIGNIFICANT CHANGE UP (ref 3.8–10.5)
WBC # FLD AUTO: 5.58 K/UL — SIGNIFICANT CHANGE UP (ref 3.8–10.5)

## 2018-04-11 PROCEDURE — 99233 SBSQ HOSP IP/OBS HIGH 50: CPT

## 2018-04-11 PROCEDURE — 71045 X-RAY EXAM CHEST 1 VIEW: CPT | Mod: 26

## 2018-04-11 PROCEDURE — 93010 ELECTROCARDIOGRAM REPORT: CPT

## 2018-04-11 RX ORDER — ALPRAZOLAM 0.25 MG
1 TABLET ORAL EVERY 8 HOURS
Qty: 0 | Refills: 0 | Status: DISCONTINUED | OUTPATIENT
Start: 2018-04-11 | End: 2018-04-13

## 2018-04-11 RX ADMIN — Medication 1 MILLIGRAM(S): at 12:11

## 2018-04-11 RX ADMIN — Medication 100 MILLIGRAM(S): at 21:16

## 2018-04-11 RX ADMIN — OXYCODONE HYDROCHLORIDE 10 MILLIGRAM(S): 5 TABLET ORAL at 22:16

## 2018-04-11 RX ADMIN — Medication 1 MILLIGRAM(S): at 06:45

## 2018-04-11 RX ADMIN — OXYCODONE HYDROCHLORIDE 10 MILLIGRAM(S): 5 TABLET ORAL at 21:16

## 2018-04-11 RX ADMIN — Medication 150 MILLIGRAM(S): at 12:10

## 2018-04-11 RX ADMIN — OXYCODONE HYDROCHLORIDE 5 MILLIGRAM(S): 5 TABLET ORAL at 11:46

## 2018-04-11 RX ADMIN — PANTOPRAZOLE SODIUM 40 MILLIGRAM(S): 20 TABLET, DELAYED RELEASE ORAL at 05:25

## 2018-04-11 RX ADMIN — Medication 12.5 MILLIGRAM(S): at 17:42

## 2018-04-11 RX ADMIN — Medication 100 MILLIGRAM(S): at 05:24

## 2018-04-11 RX ADMIN — Medication 12.5 MILLIGRAM(S): at 11:46

## 2018-04-11 RX ADMIN — RIVAROXABAN 20 MILLIGRAM(S): KIT at 17:41

## 2018-04-11 RX ADMIN — Medication 12.5 MILLIGRAM(S): at 05:24

## 2018-04-11 RX ADMIN — Medication 100 MILLIGRAM(S): at 14:43

## 2018-04-11 RX ADMIN — OXYCODONE HYDROCHLORIDE 5 MILLIGRAM(S): 5 TABLET ORAL at 12:45

## 2018-04-11 RX ADMIN — Medication 1 MILLIGRAM(S): at 21:15

## 2018-04-11 NOTE — DISCHARGE NOTE ADULT - PATIENT PORTAL LINK FT
You can access the [facility] Patient Portal, offered by [facility], by registering with the following website: [email or web address]

## 2018-04-11 NOTE — DISCHARGE NOTE ADULT - MEDICATION SUMMARY - MEDICATIONS TO STOP TAKING
I will STOP taking the medications listed below when I get home from the hospital:  None I will STOP taking the medications listed below when I get home from the hospital:    losartan-hydroCHLOROthiazide 100 mg-25 mg oral tablet  -- 1 tab(s) by mouth once a day    Multaq 400 mg oral tablet  -- 1 tab(s) by mouth 2 times a day

## 2018-04-11 NOTE — PROGRESS NOTE ADULT - SUBJECTIVE AND OBJECTIVE BOX
Patient is a 73y old  Female who presents with a chief complaint of palpitations (03 Apr 2018 14:05)  Denies palpitations or dizziness.  Mild incisional site pain.  Self converted to SR.      PAST MEDICAL & SURGICAL HISTORY:  Alcohol abuse  Hypertension  Dyslipidemia  Depression  Atrial fibrillation  Arthritis  Anxiety  History of cardioversion: 12/2017  H/O prior ablation treatment: cardiac 11/2017      MEDICATIONS  (STANDING):  diltiazem    Tablet 30 milliGRAM(s) Oral every 6 hours  docusate sodium 100 milliGRAM(s) Oral three times a day  metoprolol tartrate 12.5 milliGRAM(s) Oral every 6 hours  pantoprazole    Tablet 40 milliGRAM(s) Oral before breakfast  rivaroxaban 20 milliGRAM(s) Oral every 24 hours  venlafaxine XR. 150 milliGRAM(s) Oral daily    MEDICATIONS  (PRN):  ALPRAZolam 1 milliGRAM(s) Oral every 8 hours PRN anxiety  naloxone Injectable 0.1 milliGRAM(s) IV Push every 3 minutes PRN For ANY of the following changes in patient status:  A. RR LESS THAN 10 breaths per minute, B. Oxygen saturation LESS THAN 90%, C. Sedation score of 6  ondansetron Injectable 4 milliGRAM(s) IV Push every 6 hours PRN Nausea  oxyCODONE    IR 5 milliGRAM(s) Oral every 3 hours PRN Moderate Pain (4 - 6)  oxyCODONE    IR 10 milliGRAM(s) Oral every 3 hours PRN Severe Pain (7 - 10)            Vital Signs Last 24 Hrs  T(C): 36.9 (11 Apr 2018 11:36), Max: 37.4 (11 Apr 2018 08:00)  T(F): 98.5 (11 Apr 2018 11:36), Max: 99.4 (11 Apr 2018 08:00)  HR: 88 (11 Apr 2018 11:36) (71 - 132)  BP: 114/72 (11 Apr 2018 11:36) (92/66 - 123/73)  BP(mean): 82 (11 Apr 2018 11:36) (69 - 96)  RR: 18 (11 Apr 2018 11:36) (16 - 57)  SpO2: 99% (11 Apr 2018 11:36) (95% - 100%)            INTERPRETATION OF TELEMETRY:  SR 80's bpm    ECG:        LABS:                        12.0   5.58  )-----------( 234      ( 11 Apr 2018 05:00 )             36.3     04-11    138  |  100  |  7   ----------------------------<  89  4.3   |  32<H>  |  0.58    Ca    9.0      11 Apr 2018 05:00  Phos  3.2     04-11  Mg     1.9     04-11                BNP  RADIOLOGY & ADDITIONAL STUDIES:      PHYSICAL EXAM:    GENERAL: In no apparent distress, well nourished, and hydrated.  NECK: Supple and normal thyroid.  No JVD or carotid bruit.  Carotid pulse is 2+ bilaterally.  HEART: Regular rate and rhythm; No murmurs, rubs, or gallops.  L chest wall incision site with dressing  PULMONARY: Clear to auscultation and perfusion.  No rales, wheezing, or rhonchi bilaterally.  ABDOMEN: Soft, Nontender, Nondistended; Bowel sounds present  EXTREMITIES:  2+ Peripheral Pulses, No clubbing, cyanosis, or edema  NEUROLOGICAL: Grossly nonfocal

## 2018-04-11 NOTE — DISCHARGE NOTE ADULT - HOSPITAL COURSE
72 yo F PMHx Afib with multiple ablations in the past at [facility] (4 years ago and in Nov 2017) and cardioversion (Dec 2017) sent from pre-surgical testing for rapid afib and palpitations.   Pt recently found to have a lung nodule, at pre-surgical testing, she felt very anxious and palpitations, described as "heart racing." Upon checking her heart rate she was @ 160's bpm and was sent to [facility] Ed for evaluation. Pt denies chest pain, sob, diaphoresis, nausea, vomiting or abdominal pain.   In ED she found to have Rapid Afib @ 167bpm and was given multiple doses of IV Cardizem with partial rate control.   Patient s/p left vats, lingula sparing left upper lobectomy, post op course patient remained in CTICU for heart rate control.  Patient currently on lopressor, cardizem and started on xarelto.  Patient stable for discharge with outpatient follow up. 74 yo F PMHx Afib with multiple ablations in the past at [facility] (4 years ago and in Nov 2017) and cardioversion (Dec 2017) sent from pre-surgical testing for rapid afib and palpitations.   Pt recently found to have a lung nodule, at pre-surgical testing, she felt very anxious and palpitations, described as "heart racing." Upon checking her heart rate she was @ 160's bpm and was sent to [facility] Ed for evaluation. Pt denies chest pain, sob, diaphoresis, nausea, vomiting or abdominal pain.   In ED she found to have Rapid Afib @ 167bpm and was given multiple doses of IV Cardizem with partial rate control.   Patient s/p left vats, lingula sparing left upper lobectomy, post op course patient remained in CTICU for heart rate control.  Patient currently on lopressor, cardizem and started on xarelto.  Patient stable for discharge with outpatient follow up.  Feels well today. VATS site c/d/i. HR under control. Ambulating without difficulty. CXR WNL. Cleared for d/c to home by Dr. Arroyo.

## 2018-04-11 NOTE — DISCHARGE NOTE ADULT - MEDICATION SUMMARY - MEDICATIONS TO TAKE
I will START or STAY ON the medications listed below when I get home from the hospital:    oxyCODONE 5 mg oral tablet  -- 1 tab(s) by mouth every 4 hours (5 times/day), As Needed -Moderate Pain (4 - 6) MDD:6  -- Indication: For pain    Tylenol 325 mg oral tablet  -- 2 tab(s) by mouth every 4 hours as needed for mild pain  -- Indication: For pain    Cardizem  mg/24 hours oral capsule, extended release  -- 1 cap(s) by mouth once a day   -- It is very important that you take or use this exactly as directed.  Do not skip doses or discontinue unless directed by your doctor.  Some non-prescription drugs may aggravate your condition.  Read all labels carefully.  If a warning appears, check with your doctor before taking.  Swallow whole.  Do not crush.    -- Indication: For heart    Xarelto 20 mg oral tablet  -- 1 tab(s) by mouth once a day (in the evening) last dose 4/3  -- Indication: For AC    venlafaxine 150 mg oral capsule, extended release  -- 1 cap(s) by mouth once a day  -- Indication: For home med    ALPRAZolam 1 mg oral tablet  -- 1 tab(s) by mouth 3 times a day, As Needed  -- Indication: For home med    metoprolol tartrate 25 mg oral tablet  -- 1 tab(s) by mouth 2 times a day   -- It is very important that you take or use this exactly as directed.  Do not skip doses or discontinue unless directed by your doctor.  May cause drowsiness.  Alcohol may intensify this effect.  Use care when operating dangerous machinery.  Some non-prescription drugs may aggravate your condition.  Read all labels carefully.  If a warning appears, check with your doctor before taking.  Take with food or milk.  This drug may impair the ability to drive or operate machinery.  Use care until you become familiar with its effects.    -- Indication: For heart    docusate sodium 100 mg oral capsule  -- 1 cap(s) by mouth 3 times a day  -- Indication: For stool softner    pantoprazole 40 mg oral delayed release tablet  -- 1 tab(s) by mouth once a day  -- Indication: For stomach protection I will START or STAY ON the medications listed below when I get home from the hospital:    oxyCODONE 5 mg oral tablet  -- 1 tab(s) by mouth every 4 hours (5 times/day), As Needed -Moderate Pain (4 - 6) MDD:6  -- Indication: For pain    Tylenol 325 mg oral tablet  -- 2 tab(s) by mouth every 6 hours, As Needed  -- Indication: For pain    Cardizem  mg/24 hours oral capsule, extended release  -- 1 cap(s) by mouth once a day   -- It is very important that you take or use this exactly as directed.  Do not skip doses or discontinue unless directed by your doctor.  Some non-prescription drugs may aggravate your condition.  Read all labels carefully.  If a warning appears, check with your doctor before taking.  Swallow whole.  Do not crush.    -- Indication: For heart    Xarelto 20 mg oral tablet  -- 1 tab(s) by mouth once a day (in the evening)   -- Indication: For blood thinner    venlafaxine 150 mg oral capsule, extended release  -- 1 cap(s) by mouth once a day  -- Indication: For Anti depressant    ALPRAZolam 1 mg oral tablet  -- 1 tab(s) by mouth 3 times a day, As Needed  -- Indication: For Anxiety    metoprolol succinate 50 mg oral tablet, extended release  -- 1 tab(s) by mouth once a day  -- Indication: For heart rate control    docusate sodium 100 mg oral capsule  -- 1 cap(s) by mouth 3 times a day  -- Indication: For stool softner    pantoprazole 40 mg oral delayed release tablet  -- 1 tab(s) by mouth once a day  -- Indication: For stomach protection

## 2018-04-11 NOTE — PROGRESS NOTE ADULT - SUBJECTIVE AND OBJECTIVE BOX
73 F PMH Afib with multiple ablations in the past at [facility] (4 years ago and in Nov 2017) and cardioversion (Dec 2017) sent from pre-surgical testing for rapid afib and palpitations.   Pt recently found to have a lung nodule that is scheduled for left VAT, left upper lobe wedge resection on Friday (3/6/18). At pre-surgical testing today, she felt very anxious and palpitations, described as "heart racing." Upon checking her heart rate she was @ 160's bpm and was sent to [facility] for evaluation. Pt had no chest pain, sob, diaphoresis, nausea, vomiting or abdominal pain.     In ED she found to have Rapid Afib @ 167bpm and was given multiple doses of IV Cardizem with partial rate control.   On admission: she in Afib @ 114 bpm. Pt admits to drinking 3-4 glasses of wine daily. However, last intake of alcohol was on Easter Sunday (2 days ago) where she drank 1 bottle of wine. (03 Apr 2018 14:05)    04/06/2018  VATS Lingula sparing left upper lobectomy    Issues:  A fib/flutter w RVR on cardizem drip  S/P VATS - Lung nodule  post op pain  Alcohol abuse  Hypertension  Dyslipidemia  Depression    Home Medications:   * Patient Currently Takes Medications as of 03-Apr-2018 09:35 documented in Structured Notes  · 	venlafaxine 150 mg oral capsule, extended release: 1 cap(s) orally once a day  · 	losartan-hydroCHLOROthiazide 100 mg-25 mg oral tablet: 1 tab(s) orally once a day  · 	ALPRAZolam 1 mg oral tablet: 1 tab(s) orally 3 times a day, As Needed  · 	pantoprazole 40 mg oral delayed release tablet: 1 tab(s) orally once a day  · 	Multaq 400 mg oral tablet: 1 tab(s) orally 2 times a day  · 	Xarelto 20 mg oral tablet: 1 tab(s) orally once a day (in the evening) last dose 4/3  · 	oxyCODONE-acetaminophen 5 mg-325 mg oral tablet: 1 tab(s) orally every 6 hours, As Needed    MEDICATIONS  (STANDING):  diltiazem    Tablet 30 milliGRAM(s) Oral every 6 hours  diltiazem Infusion 10 mG/Hr (10 mL/Hr) IV Continuous <Continuous>  docusate sodium 100 milliGRAM(s) Oral three times a day  metoprolol tartrate 12.5 milliGRAM(s) Oral every 6 hours  pantoprazole    Tablet 40 milliGRAM(s) Oral before breakfast  rivaroxaban 20 milliGRAM(s) Oral every 24 hours  venlafaxine XR. 150 milliGRAM(s) Oral daily    MEDICATIONS  (PRN):  ALPRAZolam 1 milliGRAM(s) Oral three times a day PRN anxiety  LORazepam   Injectable 2 milliGRAM(s) IV Push every 1 hour PRN Symptom-triggered: each CIWA -Ar score 8 or GREATER  naloxone Injectable 0.1 milliGRAM(s) IV Push every 3 minutes PRN For ANY of the following changes in patient status:  A. RR LESS THAN 10 breaths per minute, B. Oxygen saturation LESS THAN 90%, C. Sedation score of 6  ondansetron Injectable 4 milliGRAM(s) IV Push every 6 hours PRN Nausea  oxyCODONE    IR 5 milliGRAM(s) Oral every 3 hours PRN Moderate Pain (4 - 6)  oxyCODONE    IR 10 milliGRAM(s) Oral every 3 hours PRN Severe Pain (7 - 10)    ICU Vital Signs Last 24 Hrs  T(C): 37 (11 Apr 2018 04:00), Max: 37 (11 Apr 2018 00:00)  T(F): 98.6 (11 Apr 2018 04:00), Max: 98.6 (11 Apr 2018 00:00)  HR: 85 (11 Apr 2018 09:00) (71 - 132)  BP: 92/66 (11 Apr 2018 09:00) (92/66 - 127/77)  BP(mean): 72 (11 Apr 2018 09:00) (69 - 96)  RR: 41 (11 Apr 2018 09:00) (9 - 57)  SpO2: 97% (11 Apr 2018 09:00) (95% - 100%)      Physical exam:                           General:            WD WN in NAD  Neuro:              Alert,  no focal deficits	  Respiratory:	Lungs clear to auscultation bilaterally. Good aeration.   CV:		Irregular rate and rhythm, A fib w RVR, HR mostly in the 's, rate controlled Distal pulses present.  Abdomen:	+ Bowel sounds, Soft, non-distended.   Skin:		No rash.  Extremities:	Warm, no cyanosis or edema.  Palpable pulses    I&O's Summary    10 Apr 2018 07:01  -  11 Apr 2018 07:00  --------------------------------------------------------  IN: 1860 mL / OUT: 850 mL / NET: 1010 mL      Labs:                                                                           12.0   5.58  )-----------( 234      ( 11 Apr 2018 05:00 )             36.3                            04-11    138  |  100  |  7   ----------------------------<  89  4.3   |  32<H>  |  0.58    Ca    9.0      11 Apr 2018 05:00  Phos  3.2     04-11  Mg     1.9     04-11        Plan:  73 F PMH Afib with multiple ablations in the past at [facility] (4 years ago and in Nov 2017) and cardioversion (Dec 2017) sent from pre-surgical testing for rapid afib and palpitations.   Pt recently found to have a lung nodule that is scheduled for left VAT, left upper lobe wedge resection on Friday (3/6/18). At pre-surgical testing today, she felt very anxious and palpitations, described as "heart racing." Upon checking her heart rate she was @ 160's bpm and was sent to [facility] for evaluation. Pt had no chest pain, sob, diaphoresis, nausea, vomiting or abdominal pain.     In ED she found to have Rapid Afib @ 167bpm and was given multiple doses of IV Cardizem with partial rate control.   On admission: she in Afib @ 114 bpm. Pt admits to drinking 3-4 glasses of wine daily. However, last intake of alcohol was on Easter Sunday (2 days ago) where she drank 1 bottle of wine. (03 Apr 2018 14:05)  04/06/2018  VATS Lingula sparing left upper lobectomy  Issues:  A fib/flutter w RVR on cardizem drip  S/P VATS - Lung nodule  post op pain  Alcohol abuse  Hypertension  Dyslipidemia  Depression    Neuro  Pain control with PCA / Tylenol IV     Resp  Pt is on 2 L nasal canula  CXR/CT chest:  Small L PTX.   Comfortable, not in any distress.  Using incentive spirometry  Monitor chest tube output  Chest tube to suction CT chest showed small PTX on L.    Continue bronchodilators, pulmonary toilet    CVS  Continue hemodynamic monitoring.  Not on any pressors  PAF, W RVR, on Cardizem drip, titrate to rate control Cardiology to f/u  Xarelto   Continue Lopressor  Convert Cardizem to PO      Renal  Continue LR  IVF  Monitor I/Os and electrolytes    GI    On regular diet, tolerating  Continue GI prophylaxis with Protonix  Continue Zofran / Reglan for nausea - PRN	    Endocrine  Glycemic monitoring  F/S with coverage    Heme/ Onc   SQH & SCDs for VTE prophylaxis  Monitor chest tube output. No signs of active bleeding.   Follow CBCs    Infectious Diseases  No signs of infection. Monitor for fever / leukocytosis.  All surgical incision / chest tube  sites look clean      All available pertinent clinical, laboratory, radiographic, hemodynamic, echocardiographic, respiratory data, microbiologic data and chart were reviewed and analyzed frequently throughout the day and night. GI and DVT prophylaxis, glycemic control, head of bed elevation and skin care issues were addressed.  Patient seen, examined and plan discussed with CT Surgery / CTICU team during rounds.    I have spent 45 minutes of critical care time with this patient between 7am and  2359 pm.    Mikey Renner MD

## 2018-04-11 NOTE — PROGRESS NOTE ADULT - ASSESSMENT
afib with RVR  stable now in sinus   cardizem change to PO at 60 q6  on a/c  fu with EP for further recommendation     etoh withdrawal  stable

## 2018-04-11 NOTE — PROGRESS NOTE ADULT - ASSESSMENT
73 year old female who presented with KYARA who has a PMH of HTN, anxiety, depression, HLD, failed AFib with ablation x2 at [facility] on Xarelto, multiple cardioversions now with recently diagnosed with L lung nodule s/p L upper lobectomy resection on 4/6.  Post op with recurrent PAF-rate partially controlled with AV farzad agents (CCB/BB). Patient self converted from PAF to SR today.   -Continue rate control with Cardizem 30mg Q6 and Metoprolol 12.5 mg Q6; can change to Cardizem  mg daily and Metoprolol ER 50 mg daily upon discharge  -Continue AC with Xarelto  -EKG today  -Continue care pre primary team

## 2018-04-11 NOTE — PROGRESS NOTE ADULT - SUBJECTIVE AND OBJECTIVE BOX
_________________________________________________________________________________________  ========>>  M E D I C A L   A T T E N D I N G    F O L L O W  U P  N O T E  <<=========  -----------------------------------------------------------------------------------------------------    - Patient seen and examined by me earlier today.   - In summary, patient is a 73y year old woman who originally presented with tachycardia   - Patient doing ok post op, comfortable, pain improved.    ==================>> REVIEW OF SYSTEM <<=================    GEN: no fever, no chills, pain better  RESP: no SOB, no cough, no sputum  CVS: no chest pain, + some palpitations with ambulation, no edema  GI: no abdominal pain, no nausea, no constipation, no diarrhea  : no dysuria, no frequency, no hematuria  Neuro: no headache, no dizziness  Derm : no itching, no rash    ==================>> PHYSICAL EXAM <<=================    GEN: A&O X 3 , NAD , comfortable in chair  HEENT: NCAT, PERRL, MMM, hearing intact  Neck: supple , no JVD  CVS: S1S2 , Irregular , No M/R/G appreciated  PULM: CTA B/L,  no W/R/R appreciated  ABD.: soft. non tender, non distended,  bowel sounds present  Extrem: intact pulses , no edema   PSYCH : normal mood,  not anxious       ==================>> MEDICATIONS <<====================    diltiazem    Tablet 30 milliGRAM(s) Oral every 6 hours  docusate sodium 100 milliGRAM(s) Oral three times a day  metoprolol tartrate 12.5 milliGRAM(s) Oral every 6 hours  pantoprazole    Tablet 40 milliGRAM(s) Oral before breakfast  rivaroxaban 20 milliGRAM(s) Oral every 24 hours  venlafaxine XR. 150 milliGRAM(s) Oral daily    MEDICATIONS  (PRN):  ALPRAZolam 1 milliGRAM(s) Oral every 8 hours PRN anxiety  naloxone Injectable 0.1 milliGRAM(s) IV Push every 3 minutes PRN For ANY of the following changes in patient status:  A. RR LESS THAN 10 breaths per minute, B. Oxygen saturation LESS THAN 90%, C. Sedation score of 6  ondansetron Injectable 4 milliGRAM(s) IV Push every 6 hours PRN Nausea  oxyCODONE    IR 5 milliGRAM(s) Oral every 3 hours PRN Moderate Pain (4 - 6)  oxyCODONE    IR 10 milliGRAM(s) Oral every 3 hours PRN Severe Pain (7 - 10)    ==================>> VITAL SIGNS <<==================    Vital Signs Last 24 Hrs  T(C): 36.8 (04-11-18 @ 16:26)  T(F): 98.2 (04-11-18 @ 16:26), Max: 99.4 (04-11-18 @ 08:00)  HR: 87 (04-11-18 @ 16:26) (71 - 88)  BP: 105/67 (04-11-18 @ 16:26)  BP(mean): 82 (04-11-18 @ 11:36) (69 - 84)  RR: 18 (04-11-18 @ 16:26) (18 - 57)  SpO2: 98% (04-11-18 @ 16:26) (95% - 99%)       ==================>> LAB AND IMAGING <<==================                        12.0   5.58  )-----------( 234      ( 11 Apr 2018 05:00 )             36.3        04-11    138  |  100  |  7   ----------------------------<  89  4.3   |  32<H>  |  0.58    Ca    9.0      11 Apr 2018 05:00  Phos  3.2     04-11  Mg     1.9     04-11    ___________________________________________________________________________________  ===============>>  A S S E S S M E N T   A N D   P L A N <<===============  ------------------------------------------------------------------------------------------    · Assessment		      72 yo F PMHx Afib with multiple ablations in the past at [facility] (4 years ago and in Nov 2017) and cardioversion (Dec 2017) sent from pre-surgical testing for palpitations, found to be in Rapid Afib @ 167bpm, admitted to tele for Rapid Afib, r/o ACS and Alcohol Withdrawal.  +Rapid Afib  +Alcohol Withdrawal  + lung mass    Problem/Plan - 1:  ·  Problem: Atrial fibrillation with RVR, now in sinus   tele monitor   EP and cardio f/u appreciated  Cardizem  >> PO   encourage PO hydration.   AC on, monitor     Problem/Plan - 2:  ·  Problem: Lung mass / nodule  doing well post surgical resection   close telemetry monitoring Emily-Op   follow pathology  CTU f/u appreciated    -GI/DVT Prophylaxis.  - PT, OOB    --------------------------------------------  Case discussed with pt  Education given on findings and plan of care  ___________________________  H. MAKI Silva.  Pager: [phone]

## 2018-04-11 NOTE — PROGRESS NOTE ADULT - SUBJECTIVE AND OBJECTIVE BOX
Subjective: Patient seen and examined. No new events except as noted.     SUBJECTIVE/ROS:  had afib overnight       MEDICATIONS:  MEDICATIONS  (STANDING):  diltiazem    Tablet 30 milliGRAM(s) Oral every 6 hours  diltiazem Infusion 10 mG/Hr (10 mL/Hr) IV Continuous <Continuous>  docusate sodium 100 milliGRAM(s) Oral three times a day  metoprolol tartrate 12.5 milliGRAM(s) Oral every 6 hours  pantoprazole    Tablet 40 milliGRAM(s) Oral before breakfast  rivaroxaban 20 milliGRAM(s) Oral every 24 hours  venlafaxine XR. 150 milliGRAM(s) Oral daily      PHYSICAL EXAM:  T(C): 37 (04-11-18 @ 04:00), Max: 37 (04-11-18 @ 00:00)  HR: 84 (04-11-18 @ 07:00) (71 - 132)  BP: 104/78 (04-11-18 @ 07:00) (95/62 - 127/77)  RR: 30 (04-11-18 @ 07:00) (9 - 57)  SpO2: 97% (04-11-18 @ 07:00) (94% - 100%)  Wt(kg): --  I&O's Summary    10 Apr 2018 07:01  -  11 Apr 2018 07:00  --------------------------------------------------------  IN: 1860 mL / OUT: 850 mL / NET: 1010 mL        JVP: Normal  Neck: supple  Lung: clear   CV: S1 S2 , Murmur:  Abd: soft  Ext: No edema  neuro: Awake / alert  Psych: flat affect  Skin: normal       LABS/DATA:    CARDIAC MARKERS:                                12.0   5.58  )-----------( 234      ( 11 Apr 2018 05:00 )             36.3     04-11    138  |  100  |  7   ----------------------------<  89  4.3   |  32<H>  |  0.58    Ca    9.0      11 Apr 2018 05:00  Phos  3.2     04-11  Mg     1.9     04-11      proBNP:   Lipid Profile:   HgA1c:   TSH:     TELE:  EKG:

## 2018-04-11 NOTE — DISCHARGE NOTE ADULT - NS AS ACTIVITY OBS
No Heavy lifting/straining/Showering allowed/Walking-Outdoors allowed/Walking-Indoors allowed/Do not make important decisions/Do not drive or operate machinery/Stairs allowed

## 2018-04-11 NOTE — DISCHARGE NOTE ADULT - PLAN OF CARE
s/p left vats, lingula sparing left upper lobectomy- continued wound healing Follow up with Dr. Arroyo in 7-10 days   Follow up with primary care provider in one week   Use incentive spirometer  Increase ambulation as tolerated  Pain management   Follow up with cardiologist Follow up with Dr. Arroyo in 7-10 days Call for an apt. [phone]  Follow up with primary care provider in one week   Use incentive spirometer  Increase ambulation as tolerated. Walk 4-5 x per day. Increase as tolerated. YOu may climb stairs.   You may keep all wounds uncovered and shower daily. Suture will be removed in office.   Pain management   Follow up with cardiologist. Continue new cardiac medications as directed. Continue blood thinner.

## 2018-04-11 NOTE — DISCHARGE NOTE ADULT - CARE PROVIDER_API CALL
Zachary Arroyo), Thoracic Surgery  [id number] [place] [address]  Phone: [phone]  Fax: [phone]

## 2018-04-11 NOTE — DISCHARGE NOTE ADULT - CARE PLAN
Principal Discharge DX:	Lung nodule  Goal:	s/p left vats, lingula sparing left upper lobectomy- continued wound healing  Assessment and plan of treatment:	Follow up with Dr. Arroyo in 7-10 days   Follow up with primary care provider in one week   Use incentive spirometer  Increase ambulation as tolerated  Pain management   Follow up with cardiologist Principal Discharge DX:	Lung nodule  Goal:	s/p left vats, lingula sparing left upper lobectomy- continued wound healing  Assessment and plan of treatment:	Follow up with Dr. Arroyo in 7-10 days Call for an apt. [phone]  Follow up with primary care provider in one week   Use incentive spirometer  Increase ambulation as tolerated. Walk 4-5 x per day. Increase as tolerated. YOu may climb stairs.   You may keep all wounds uncovered and shower daily. Suture will be removed in office.   Pain management   Follow up with cardiologist. Continue new cardiac medications as directed. Continue blood thinner.

## 2018-04-11 NOTE — DISCHARGE NOTE ADULT - ADDITIONAL INSTRUCTIONS
please see Dr Arroyo in office in 7-10 days  Please follow with your cardiologist upon discharge  call for appointments

## 2018-04-12 LAB
BUN SERPL-MCNC: 10 MG/DL — SIGNIFICANT CHANGE UP (ref 7–23)
CA-I BLD-SCNC: 1.14 MMOL/L — SIGNIFICANT CHANGE UP (ref 1.03–1.23)
CALCIUM SERPL-MCNC: 8.7 MG/DL — SIGNIFICANT CHANGE UP (ref 8.4–10.5)
CHLORIDE SERPL-SCNC: 100 MMOL/L — SIGNIFICANT CHANGE UP (ref 98–107)
CO2 SERPL-SCNC: 27 MMOL/L — SIGNIFICANT CHANGE UP (ref 22–31)
CREAT SERPL-MCNC: 0.56 MG/DL — SIGNIFICANT CHANGE UP (ref 0.5–1.3)
GLUCOSE SERPL-MCNC: 81 MG/DL — SIGNIFICANT CHANGE UP (ref 70–99)
HCT VFR BLD CALC: 35.7 % — SIGNIFICANT CHANGE UP (ref 34.5–45)
HGB BLD-MCNC: 12 G/DL — SIGNIFICANT CHANGE UP (ref 11.5–15.5)
MAGNESIUM SERPL-MCNC: 1.8 MG/DL — SIGNIFICANT CHANGE UP (ref 1.6–2.6)
MCHC RBC-ENTMCNC: 33.6 % — SIGNIFICANT CHANGE UP (ref 32–36)
MCHC RBC-ENTMCNC: 34.1 PG — HIGH (ref 27–34)
MCV RBC AUTO: 101.4 FL — HIGH (ref 80–100)
NRBC # FLD: 0 — SIGNIFICANT CHANGE UP
PHOSPHATE SERPL-MCNC: 3.7 MG/DL — SIGNIFICANT CHANGE UP (ref 2.5–4.5)
PLATELET # BLD AUTO: 236 K/UL — SIGNIFICANT CHANGE UP (ref 150–400)
PMV BLD: 10.4 FL — SIGNIFICANT CHANGE UP (ref 7–13)
POTASSIUM SERPL-MCNC: 4 MMOL/L — SIGNIFICANT CHANGE UP (ref 3.5–5.3)
POTASSIUM SERPL-SCNC: 4 MMOL/L — SIGNIFICANT CHANGE UP (ref 3.5–5.3)
RBC # BLD: 3.52 M/UL — LOW (ref 3.8–5.2)
RBC # FLD: 12.8 % — SIGNIFICANT CHANGE UP (ref 10.3–14.5)
SODIUM SERPL-SCNC: 137 MMOL/L — SIGNIFICANT CHANGE UP (ref 135–145)
WBC # BLD: 5.28 K/UL — SIGNIFICANT CHANGE UP (ref 3.8–10.5)
WBC # FLD AUTO: 5.28 K/UL — SIGNIFICANT CHANGE UP (ref 3.8–10.5)

## 2018-04-12 PROCEDURE — 71045 X-RAY EXAM CHEST 1 VIEW: CPT | Mod: 26

## 2018-04-12 RX ORDER — DRONEDARONE 400 MG/1
1 TABLET, FILM COATED ORAL
Qty: 0 | Refills: 0 | COMMUNITY

## 2018-04-12 RX ORDER — DILTIAZEM HCL 120 MG
1 CAPSULE, EXT RELEASE 24 HR ORAL
Qty: 30 | Refills: 0
Start: 2018-04-12 | End: 2018-05-11

## 2018-04-12 RX ORDER — DILTIAZEM HCL 120 MG
120 CAPSULE, EXT RELEASE 24 HR ORAL DAILY
Qty: 0 | Refills: 0 | Status: DISCONTINUED | OUTPATIENT
Start: 2018-04-12 | End: 2018-04-13

## 2018-04-12 RX ORDER — METOPROLOL TARTRATE 50 MG
1 TABLET ORAL
Qty: 60 | Refills: 0 | OUTPATIENT
Start: 2018-04-12 | End: 2018-05-11

## 2018-04-12 RX ORDER — OXYCODONE HYDROCHLORIDE 5 MG/1
1 TABLET ORAL
Qty: 25 | Refills: 0
Start: 2018-04-12 | End: 2018-04-16

## 2018-04-12 RX ORDER — DOCUSATE SODIUM 100 MG
1 CAPSULE ORAL
Qty: 0 | Refills: 0 | DISCHARGE
Start: 2018-04-12

## 2018-04-12 RX ORDER — LOSARTAN/HYDROCHLOROTHIAZIDE 100MG-25MG
1 TABLET ORAL
Qty: 0 | Refills: 0 | COMMUNITY

## 2018-04-12 RX ORDER — METOPROLOL TARTRATE 50 MG
50 TABLET ORAL DAILY
Qty: 0 | Refills: 0 | Status: DISCONTINUED | OUTPATIENT
Start: 2018-04-12 | End: 2018-04-13

## 2018-04-12 RX ADMIN — OXYCODONE HYDROCHLORIDE 10 MILLIGRAM(S): 5 TABLET ORAL at 17:57

## 2018-04-12 RX ADMIN — Medication 50 MILLIGRAM(S): at 18:01

## 2018-04-12 RX ADMIN — Medication 12.5 MILLIGRAM(S): at 05:07

## 2018-04-12 RX ADMIN — Medication 100 MILLIGRAM(S): at 21:08

## 2018-04-12 RX ADMIN — Medication 100 MILLIGRAM(S): at 05:07

## 2018-04-12 RX ADMIN — OXYCODONE HYDROCHLORIDE 10 MILLIGRAM(S): 5 TABLET ORAL at 19:41

## 2018-04-12 RX ADMIN — RIVAROXABAN 20 MILLIGRAM(S): KIT at 17:58

## 2018-04-12 RX ADMIN — Medication 1 MILLIGRAM(S): at 09:56

## 2018-04-12 RX ADMIN — OXYCODONE HYDROCHLORIDE 10 MILLIGRAM(S): 5 TABLET ORAL at 10:56

## 2018-04-12 RX ADMIN — Medication 150 MILLIGRAM(S): at 12:52

## 2018-04-12 RX ADMIN — PANTOPRAZOLE SODIUM 40 MILLIGRAM(S): 20 TABLET, DELAYED RELEASE ORAL at 05:07

## 2018-04-12 RX ADMIN — Medication 100 MILLIGRAM(S): at 14:59

## 2018-04-12 RX ADMIN — Medication 12.5 MILLIGRAM(S): at 00:13

## 2018-04-12 RX ADMIN — OXYCODONE HYDROCHLORIDE 10 MILLIGRAM(S): 5 TABLET ORAL at 09:56

## 2018-04-12 NOTE — PROGRESS NOTE ADULT - SUBJECTIVE AND OBJECTIVE BOX
Subjective: Patient seen and examined. No new events except as noted.     SUBJECTIVE/ROS:  No chest pain, dyspnea, palpitation, or dizziness.       MEDICATIONS:  MEDICATIONS  (STANDING):  diltiazem    Tablet 30 milliGRAM(s) Oral every 6 hours  docusate sodium 100 milliGRAM(s) Oral three times a day  metoprolol tartrate 12.5 milliGRAM(s) Oral every 6 hours  pantoprazole    Tablet 40 milliGRAM(s) Oral before breakfast  rivaroxaban 20 milliGRAM(s) Oral every 24 hours  venlafaxine XR. 150 milliGRAM(s) Oral daily      PHYSICAL EXAM:  T(C): 36.7 (04-12-18 @ 04:55), Max: 37.4 (04-11-18 @ 08:00)  HR: 83 (04-12-18 @ 04:55) (83 - 88)  BP: 96/71 (04-12-18 @ 04:55) (92/66 - 114/72)  RR: 16 (04-12-18 @ 04:55) (16 - 45)  SpO2: 97% (04-12-18 @ 04:55) (95% - 100%)  Wt(kg): --  I&O's Summary      JVP: Normal  Neck: supple  Lung: clear   CV: S1 S2 , Murmur:  Abd: soft  Ext: No edema  neuro: Awake / alert  Psych: flat affect  Skin: normal       LABS/DATA:    CARDIAC MARKERS:                                12.0   5.28  )-----------( 236      ( 12 Apr 2018 05:45 )             35.7     04-12    137  |  100  |  10  ----------------------------<  81  4.0   |  27  |  0.56    Ca    8.7      12 Apr 2018 05:45  Phos  3.7     04-12  Mg     1.8     04-12      proBNP:   Lipid Profile:   HgA1c:   TSH:     TELE:  EKG:

## 2018-04-12 NOTE — PROGRESS NOTE ADULT - ASSESSMENT
afib  Hr stable in sinus   on a/c  fu with EP for further recommendation     etoh withdrawal  stable

## 2018-04-12 NOTE — PROGRESS NOTE ADULT - SUBJECTIVE AND OBJECTIVE BOX
_________________________________________________________________________________________  ========>>  M E D I C A L   A T T E N D I N G    F O L L O W  U P  N O T E  <<=========  -----------------------------------------------------------------------------------------------------    - Patient seen and examined by me earlier today.   - In summary, patient is a 73y year old woman who originally presented with tachycardia   - Patient today doing better, comfortable, pain improved.    ==================>> REVIEW OF SYSTEM <<=================    GEN: no fever, no chills, pain better  RESP: no SOB, no cough, no sputum  CVS: no chest pain, no palpitations with ambulation, no edema  GI: no abdominal pain, no nausea, no constipation, no diarrhea  : no dysuria, no frequency, no hematuria  Neuro: no headache, no dizziness  Derm : no itching, no rash    ==================>> PHYSICAL EXAM <<=================    GEN: A&O X 3 , NAD , comfortable in chair  HEENT: NCAT, PERRL, MMM, hearing intact  Neck: supple , no JVD  CVS: S1S2 , Irregular , No M/R/G appreciated  PULM: CTA B/L,  no W/R/R appreciated  ABD.: soft. non tender, non distended,  bowel sounds present  Extrem: intact pulses , no edema   PSYCH : normal mood,  not anxious       ==================>> MEDICATIONS <<====================    diltiazem    milliGRAM(s) Oral daily  docusate sodium 100 milliGRAM(s) Oral three times a day  metoprolol succinate ER 50 milliGRAM(s) Oral daily  pantoprazole    Tablet 40 milliGRAM(s) Oral before breakfast  rivaroxaban 20 milliGRAM(s) Oral every 24 hours  venlafaxine XR. 150 milliGRAM(s) Oral daily    MEDICATIONS  (PRN):  ALPRAZolam 1 milliGRAM(s) Oral every 8 hours PRN anxiety  naloxone Injectable 0.1 milliGRAM(s) IV Push every 3 minutes PRN For ANY of the following changes in patient status:  A. RR LESS THAN 10 breaths per minute, B. Oxygen saturation LESS THAN 90%, C. Sedation score of 6  ondansetron Injectable 4 milliGRAM(s) IV Push every 6 hours PRN Nausea  oxyCODONE    IR 5 milliGRAM(s) Oral every 3 hours PRN Moderate Pain (4 - 6)  oxyCODONE    IR 10 milliGRAM(s) Oral every 3 hours PRN Severe Pain (7 - 10)    ==================>> VITAL SIGNS <<==================    Vital Signs Last 24 Hrs  T(C): 36.8 (04-12-18 @ 16:23)  T(F): 98.3 (04-12-18 @ 16:23), Max: 98.4 (04-12-18 @ 07:55)  HR: 71 (04-12-18 @ 16:23) (71 - 91)  BP: 103/66 (04-12-18 @ 16:23)  BP(mean): --  RR: 18 (04-12-18 @ 16:23) (16 - 20)  SpO2: 100% (04-12-18 @ 16:23) (96% - 100%)       ==================>> LAB AND IMAGING <<==================                        12.0   5.28  )-----------( 236      ( 12 Apr 2018 05:45 )             35.7        04-12    137  |  100  |  10  ----------------------------<  81  4.0   |  27  |  0.56    Ca    8.7      12 Apr 2018 05:45  Phos  3.7     04-12  Mg     1.8     04-12    ___________________________________________________________________________________  ===============>>  A S S E S S M E N T   A N D   P L A N <<===============  ------------------------------------------------------------------------------------------    · Assessment	      74 yo F PMHx Afib with multiple ablations in the past at [facility] (4 years ago and in Nov 2017) and cardioversion (Dec 2017) sent from pre-surgical testing for palpitations, found to be in Rapid Afib @ 167bpm, admitted to tele for Rapid Afib, r/o ACS and Alcohol Withdrawal.  +Rapid Afib  +Alcohol Withdrawal  + lung mass    Problem/Plan - 1:  ·  Problem: PAF   EP and cardio f/u appreciated  Cardizem  PO   encourage PO hydration.   AC on, monitor as overall improved      Problem/Plan - 2:  ·  Problem: Lung mass / nodule  doing well post surgical resection   follow pathology  CT Sx mgmt appreciated    -GI/DVT Prophylaxis.  - PT, OOB    --------------------------------------------  Case discussed with pt  Education given on findings and plan of care  ___________________________  H. MAKI Silva.  Pager: [phone]

## 2018-04-12 NOTE — PROGRESS NOTE ADULT - SUBJECTIVE AND OBJECTIVE BOX
Subjective: no acute complaints    Vital Signs:  Vital Signs Last 24 Hrs  T(C): 36.9 (04-12-18 @ 07:55), Max: 36.9 (04-11-18 @ 11:36)  T(F): 98.4 (04-12-18 @ 07:55), Max: 98.5 (04-11-18 @ 11:36)  HR: 86 (04-12-18 @ 07:55) (83 - 88)  BP: 109/84 (04-12-18 @ 07:55) (96/61 - 114/72)  RR: 20 (04-12-18 @ 07:55) (16 - 29)  SpO2: 98% (04-12-18 @ 07:55) (97% - 100%) on (O2)    Telemetry/Alarms:  General: WN/WD NAD  Neurology: Awake, nonfocal, MOHAN x 4  Eyes: Scleras clear, PERRLA/ EOMI, Gross vision intact  ENT:Gross hearing intact, grossly patent pharynx, no stridor  Neck: Neck supple, trachea midline, No JVD,   Respiratory: CTA B/L, No wheezing, rales, rhonchi  CV: S1S2, no murmurs, rubs or gallops  Abdominal: Soft, NT, ND +BS,   Extremities: No edema, + peripheral pulses  Skin: No Rashes, Hematoma, Ecchymosis  Lymphatic: No Neck, axilla, groin LAD  Psych: Oriented x 3, normal affect  Incisions: c,d,i  Tubes: removed postop  Relevant labs, radiology and Medications reviewed                        12.0   5.28  )-----------( 236      ( 12 Apr 2018 05:45 )             35.7     04-12    137  |  100  |  10  ----------------------------<  81  4.0   |  27  |  0.56    Ca    8.7      12 Apr 2018 05:45  Phos  3.7     04-12  Mg     1.8     04-12        MEDICATIONS  (STANDING):  diltiazem    Tablet 30 milliGRAM(s) Oral every 6 hours  docusate sodium 100 milliGRAM(s) Oral three times a day  metoprolol tartrate 12.5 milliGRAM(s) Oral every 6 hours  pantoprazole    Tablet 40 milliGRAM(s) Oral before breakfast  rivaroxaban 20 milliGRAM(s) Oral every 24 hours  venlafaxine XR. 150 milliGRAM(s) Oral daily    MEDICATIONS  (PRN):  ALPRAZolam 1 milliGRAM(s) Oral every 8 hours PRN anxiety  naloxone Injectable 0.1 milliGRAM(s) IV Push every 3 minutes PRN For ANY of the following changes in patient status:  A. RR LESS THAN 10 breaths per minute, B. Oxygen saturation LESS THAN 90%, C. Sedation score of 6  ondansetron Injectable 4 milliGRAM(s) IV Push every 6 hours PRN Nausea  oxyCODONE    IR 5 milliGRAM(s) Oral every 3 hours PRN Moderate Pain (4 - 6)  oxyCODONE    IR 10 milliGRAM(s) Oral every 3 hours PRN Severe Pain (7 - 10)    Pertinent Physical Exam  I&O's Summary      Assessment  73y Female  w/ PAST MEDICAL & SURGICAL HISTORY:  Alcohol abuse  Hypertension  Dyslipidemia  Depression  Atrial fibrillation  Arthritis  Anxiety  History of cardioversion: 12/2017  H/O prior ablation treatment: cardiac 11/2017  admitted with complaints of Patient is a 73y old  Female who presents with a chief complaint of palpitations (03 Apr 2018 14:05)  .  On (4/6/18), patient underwent Lobectomy, using VATS  . Postoperative course/issues: preop and postop afib on cardizem adn lopressor, xarelto for AC, cleared for discharge - attending told pt she must have someone stay with her for first 3 days home and no one is available until tomorrow - 24h notice given,    PLAN  Neuro: Pain management  Pulm: Encourage coughing, deep breathing and use of incentive spirometry. Wean off supplemental oxygen as able. Daily CXR.   Cardio: Monitor telemetry/alarms; cardiology consult, continue current meds  GI: Tolerating diet. Continue stool softeners.  Renal: monitor urine output, supplement electrolytes as needed  Vasc: Heparin SC/SCDs for DVT prophylaxis  Heme: Stable H/H. .   ID: Off antibiotics. Stable.  Therapy: OOB/ambulate  Tubes: Monitor Chest tube output  Disposition: Aim to D/C to home once person available to stay with pt  Discussed with Cardiothoracic Team at AM rounds.

## 2018-04-12 NOTE — PROGRESS NOTE ADULT - SUBJECTIVE AND OBJECTIVE BOX
INTERVAL HISTORY:  Patient seen and examined. Appears comfortable. Marlon any CP, SOB, dizziness, LH, palps or near syncope or syncope.   Possible d/c to rehab tomorrow   	  MEDICATIONS:  diltiazem    milliGRAM(s) Oral daily  metoprolol succinate ER 50 milliGRAM(s) Oral daily  rivaroxaban 20 milliGRAM(s) Oral every 24 hours  ALPRAZolam 1 milliGRAM(s) Oral every 8 hours PRN  ondansetron Injectable 4 milliGRAM(s) IV Push every 6 hours PRN  oxyCODONE    IR 5 milliGRAM(s) Oral every 3 hours PRN  oxyCODONE    IR 10 milliGRAM(s) Oral every 3 hours PRN  venlafaxine XR. 150 milliGRAM(s) Oral daily  docusate sodium 100 milliGRAM(s) Oral three times a day  pantoprazole    Tablet 40 milliGRAM(s) Oral before breakfast          PHYSICAL EXAM:  T(C): 36.8 (04-12-18 @ 16:23), Max: 36.9 (04-12-18 @ 07:55)  HR: 71 (04-12-18 @ 16:23) (71 - 91)  BP: 103/66 (04-12-18 @ 16:23) (96/61 - 112/81)  RR: 18 (04-12-18 @ 16:23) (16 - 20)  SpO2: 100% (04-12-18 @ 16:23) (96% - 100%)  Wt(kg): --  I&O's Summary      Appearance: Normal	  HEENT:   Normal oral mucosa, PERRL, EOMI	  Lymphatic: No lymphadenopathy  Cardiovascular: Normal S1 S2, No JVD, No murmurs, No edema  Respiratory: Lungs clear to auscultation	  Psychiatry: A & O x 3, Mood & affect appropriate  Gastrointestinal:  Soft, Non-tender, + BS	  Skin: No rashes, No ecchymoses, No cyanosis  Neurologic: Non-focal  Extremities: Normal range of motion, No clubbing, cyanosis or edema  Vascular: Peripheral pulses palpable 2+ bilaterally  Neuro : A& O x 3   SKin : Intact      LABS:	 	    CARDIAC MARKERS:                          12.0   5.28  )-----------( 236      ( 12 Apr 2018 05:45 )             35.7     04-12    137  |  100  |  10  ----------------------------<  81  4.0   |  27  |  0.56    Ca    8.7      12 Apr 2018 05:45  Phos  3.7     04-12  Mg     1.8     04-12      assessment /plan :  	  73 year old female who presented with KYARA who has a PMH of HTN, anxiety, depression, HLD, failed AFib with ablation x2 at [facility] on Xarelto, multiple cardioversions now with recently diagnosed with L lung nodule s/p L upper lobectomy resection on 4/6.  Post op with recurrent PAF-rate partially controlled with AV farzad agents (CCB/BB). Patient self converted from PAF to SR today.   -Continue rate control with Cardizem  mg and Metoprolol 50 ER  -Continue AC with Xarelto  -Continue care pre primary team    thank you.     Marsha Bansal, FNP-C  [id number]

## 2018-04-13 VITALS
HEART RATE: 63 BPM | SYSTOLIC BLOOD PRESSURE: 101 MMHG | OXYGEN SATURATION: 96 % | TEMPERATURE: 98 F | DIASTOLIC BLOOD PRESSURE: 50 MMHG | RESPIRATION RATE: 20 BRPM

## 2018-04-13 PROCEDURE — 71045 X-RAY EXAM CHEST 1 VIEW: CPT | Mod: 26

## 2018-04-13 PROCEDURE — 99238 HOSP IP/OBS DSCHRG MGMT 30/<: CPT

## 2018-04-13 RX ORDER — METOPROLOL TARTRATE 50 MG
1 TABLET ORAL
Qty: 30 | Refills: 0
Start: 2018-04-13 | End: 2018-05-12

## 2018-04-13 RX ORDER — ACETAMINOPHEN 500 MG
2 TABLET ORAL
Qty: 0 | Refills: 0 | COMMUNITY

## 2018-04-13 RX ORDER — RIVAROXABAN 15 MG-20MG
1 KIT ORAL
Qty: 0 | Refills: 0 | COMMUNITY

## 2018-04-13 RX ADMIN — Medication 50 MILLIGRAM(S): at 09:41

## 2018-04-13 RX ADMIN — PANTOPRAZOLE SODIUM 40 MILLIGRAM(S): 20 TABLET, DELAYED RELEASE ORAL at 05:43

## 2018-04-13 RX ADMIN — Medication 120 MILLIGRAM(S): at 09:41

## 2018-04-13 RX ADMIN — Medication 100 MILLIGRAM(S): at 05:43

## 2018-04-13 RX ADMIN — OXYCODONE HYDROCHLORIDE 10 MILLIGRAM(S): 5 TABLET ORAL at 07:58

## 2018-04-13 RX ADMIN — Medication 1 MILLIGRAM(S): at 07:59

## 2018-04-13 RX ADMIN — OXYCODONE HYDROCHLORIDE 10 MILLIGRAM(S): 5 TABLET ORAL at 08:58

## 2018-04-13 NOTE — PROVIDER CONTACT NOTE (OTHER) - BACKGROUND
Lobectomy, using VATS  04/06/2018  VATS Lingula sparing left upper lobectomy
patient s/p MONTY lobectomy

## 2018-04-13 NOTE — PROGRESS NOTE ADULT - SUBJECTIVE AND OBJECTIVE BOX
Patient is a 73y old  Female who presents with a chief complaint of palpitations (03 Apr 2018 14:05)  Offers no complaints.  No palpitations or dizziness.    PAST MEDICAL & SURGICAL HISTORY:  Alcohol abuse  Hypertension  Dyslipidemia  Depression  Atrial fibrillation  Arthritis  Anxiety  History of cardioversion: 12/2017  H/O prior ablation treatment: cardiac 11/2017      MEDICATIONS  (STANDING):  diltiazem    milliGRAM(s) Oral daily  docusate sodium 100 milliGRAM(s) Oral three times a day  metoprolol succinate ER 50 milliGRAM(s) Oral daily  pantoprazole    Tablet 40 milliGRAM(s) Oral before breakfast  rivaroxaban 20 milliGRAM(s) Oral every 24 hours  venlafaxine XR. 150 milliGRAM(s) Oral daily    MEDICATIONS  (PRN):  ALPRAZolam 1 milliGRAM(s) Oral every 8 hours PRN anxiety  naloxone Injectable 0.1 milliGRAM(s) IV Push every 3 minutes PRN For ANY of the following changes in patient status:  A. RR LESS THAN 10 breaths per minute, B. Oxygen saturation LESS THAN 90%, C. Sedation score of 6  ondansetron Injectable 4 milliGRAM(s) IV Push every 6 hours PRN Nausea  oxyCODONE    IR 5 milliGRAM(s) Oral every 3 hours PRN Moderate Pain (4 - 6)  oxyCODONE    IR 10 milliGRAM(s) Oral every 3 hours PRN Severe Pain (7 - 10)            Vital Signs Last 24 Hrs  T(C): 36.6 (13 Apr 2018 09:37), Max: 37.1 (13 Apr 2018 05:41)  T(F): 97.9 (13 Apr 2018 09:37), Max: 98.8 (13 Apr 2018 05:41)  HR: 63 (13 Apr 2018 09:37) (63 - 91)  BP: 101/50 (13 Apr 2018 09:37) (94/61 - 112/81)  BP(mean): --  RR: 20 (13 Apr 2018 09:37) (18 - 20)  SpO2: 96% (13 Apr 2018 09:37) (96% - 100%)            INTERPRETATION OF TELEMETRY:  SR with transient burst of PAT (<5 seconds); transient sinus eugene ~50's bpm    ECG:        LABS:                        12.0   5.28  )-----------( 236      ( 12 Apr 2018 05:45 )             35.7     04-12    137  |  100  |  10  ----------------------------<  81  4.0   |  27  |  0.56    Ca    8.7      12 Apr 2018 05:45  Phos  3.7     04-12  Mg     1.8     04-12                BNP  RADIOLOGY & ADDITIONAL STUDIES:      PHYSICAL EXAM:    GENERAL: In no apparent distress, well nourished, and hydrated.  NECK: Supple and normal thyroid.  No JVD or carotid bruit.  Carotid pulse is 2+ bilaterally.  HEART: Regular rate and rhythm; No murmurs, rubs, or gallops.  PULMONARY: Clear to auscultation and perfusion.  No rales, wheezing, or rhonchi bilaterally. L posterior incision sites dry intact  ABDOMEN: Soft, Nontender, Nondistended; Bowel sounds present  EXTREMITIES:  2+ Peripheral Pulses, No clubbing, cyanosis, or edema  NEUROLOGICAL: Grossly nonfocal

## 2018-04-13 NOTE — PROGRESS NOTE ADULT - SUBJECTIVE AND OBJECTIVE BOX
Subjective: Patient seen and examined. No new events except as noted.     SUBJECTIVE/ROS:  no new events       MEDICATIONS:  MEDICATIONS  (STANDING):  diltiazem    milliGRAM(s) Oral daily  docusate sodium 100 milliGRAM(s) Oral three times a day  metoprolol succinate ER 50 milliGRAM(s) Oral daily  pantoprazole    Tablet 40 milliGRAM(s) Oral before breakfast  rivaroxaban 20 milliGRAM(s) Oral every 24 hours  venlafaxine XR. 150 milliGRAM(s) Oral daily      PHYSICAL EXAM:  T(C): 37.1 (04-13-18 @ 05:41), Max: 37.1 (04-13-18 @ 05:41)  HR: 66 (04-13-18 @ 05:41) (66 - 91)  BP: 94/61 (04-13-18 @ 05:41) (94/61 - 112/81)  RR: 18 (04-13-18 @ 05:41) (18 - 20)  SpO2: 96% (04-13-18 @ 05:41) (96% - 100%)  Wt(kg): --  I&O's Summary        Appearance: Normal	  HEENT:   Normal oral mucosa, PERRL, EOMI	  Cardiovascular: Normal S1 S2,    Murmur:   Neck: JVP normal  Respiratory: Lungs clear to auscultation  Gastrointestinal:  Soft, Non-tender, + BS	  Skin: normal   Neuro: No gross deficits.   Psychiatry:  Mood & affect appropriate  Ext: No edema      LABS/DATA:    CARDIAC MARKERS:                                12.0   5.28  )-----------( 236      ( 12 Apr 2018 05:45 )             35.7     04-12    137  |  100  |  10  ----------------------------<  81  4.0   |  27  |  0.56    Ca    8.7      12 Apr 2018 05:45  Phos  3.7     04-12  Mg     1.8     04-12      proBNP:   Lipid Profile:   HgA1c:   TSH:     TELE:  EKG:

## 2018-04-13 NOTE — PROGRESS NOTE ADULT - ASSESSMENT
73 year old female who presented with KYARA who has a PMH of HTN, anxiety, depression, HLD, failed AFib with ablation x2 at [facility] on Xarelto, multiple cardioversions now with recently diagnosed with L lung nodule s/p L upper lobectomy resection on 4/6.  Post op with recurrent PAF-rate partially controlled with AV farzad agents (CCB/BB). Patient self converted from PAF to SR post-op.   -Continue Cardizem  mg and Metoprolol 50 ER daily for PAF rate control ; follow up with her cardiologist  -Continue AC with Xarelto  -Continue care pre primary team

## 2018-04-13 NOTE — PROGRESS NOTE ADULT - PROVIDER SPECIALTY LIST ADULT
Anesthesia
Cardiology
Critical Care
Electrophysiology
Internal Medicine
Pain Medicine
Thoracic Surgery
Thoracic Surgery
Critical Care

## 2018-04-25 ENCOUNTER — APPOINTMENT (OUTPATIENT)
Dept: THORACIC SURGERY | Facility: CLINIC | Age: 73
End: 2018-04-25
Payer: MEDICARE

## 2018-04-25 VITALS
TEMPERATURE: 98.4 F | BODY MASS INDEX: 27.66 KG/M2 | WEIGHT: 166 LBS | HEIGHT: 65 IN | HEART RATE: 48 BPM | OXYGEN SATURATION: 99 % | RESPIRATION RATE: 15 BRPM | DIASTOLIC BLOOD PRESSURE: 77 MMHG | SYSTOLIC BLOOD PRESSURE: 127 MMHG

## 2018-04-25 PROCEDURE — 99024 POSTOP FOLLOW-UP VISIT: CPT

## 2018-04-25 RX ORDER — LOSARTAN POTASSIUM AND HYDROCHLOROTHIAZIDE 100; 25 MG/1; MG/1
100-25 TABLET, FILM COATED ORAL
Refills: 0 | Status: DISCONTINUED | COMMUNITY
End: 2018-04-25

## 2018-04-25 RX ORDER — METOPROLOL SUCCINATE 50 MG/1
50 TABLET, EXTENDED RELEASE ORAL DAILY
Refills: 0 | Status: ACTIVE | COMMUNITY

## 2018-04-25 RX ORDER — OXYCODONE 5 MG/1
5 TABLET ORAL EVERY 6 HOURS
Qty: 20 | Refills: 0 | Status: DISCONTINUED | COMMUNITY
Start: 2018-04-18 | End: 2018-04-25

## 2018-04-25 RX ORDER — DILTIAZEM HYDROCHLORIDE 120 MG/1
120 CAPSULE, COATED, EXTENDED RELEASE ORAL DAILY
Refills: 0 | Status: ACTIVE | COMMUNITY

## 2018-04-25 RX ORDER — PANTOPRAZOLE 40 MG/1
40 TABLET, DELAYED RELEASE ORAL DAILY
Refills: 0 | Status: ACTIVE | COMMUNITY

## 2018-04-25 RX ORDER — DRONEDARONE 400 MG/1
400 TABLET, FILM COATED ORAL TWICE DAILY
Refills: 0 | Status: DISCONTINUED | COMMUNITY
End: 2018-04-25

## 2018-07-10 ENCOUNTER — FORM ENCOUNTER (OUTPATIENT)
Age: 73
End: 2018-07-10

## 2018-07-11 ENCOUNTER — OUTPATIENT (OUTPATIENT)
Dept: OUTPATIENT SERVICES | Facility: HOSPITAL | Age: 73
LOS: 1 days | End: 2018-07-11
Payer: MEDICARE

## 2018-07-11 ENCOUNTER — APPOINTMENT (OUTPATIENT)
Dept: CT IMAGING | Facility: IMAGING CENTER | Age: 73
End: 2018-07-11
Payer: MEDICARE

## 2018-07-11 DIAGNOSIS — Z98.890 OTHER SPECIFIED POSTPROCEDURAL STATES: Chronic | ICD-10-CM

## 2018-07-11 DIAGNOSIS — R91.1 SOLITARY PULMONARY NODULE: ICD-10-CM

## 2018-07-11 PROCEDURE — 71250 CT THORAX DX C-: CPT | Mod: 26

## 2018-07-11 PROCEDURE — 71250 CT THORAX DX C-: CPT

## 2018-07-16 NOTE — BRIEF OPERATIVE NOTE - PRE-OP
Problem: Patient Care Overview  Goal: Plan of Care/Patient Progress Review    4A: Cxl - Per chart review and discussion with RN, pt with declining medical status and not appropriate for therapy evaluation today.       <<-----Click on this checkbox to enter Pre-Op Dx

## 2018-07-25 ENCOUNTER — APPOINTMENT (OUTPATIENT)
Dept: THORACIC SURGERY | Facility: CLINIC | Age: 73
End: 2018-07-25
Payer: MEDICARE

## 2018-07-25 VITALS
BODY MASS INDEX: 26.13 KG/M2 | RESPIRATION RATE: 15 BRPM | WEIGHT: 157 LBS | SYSTOLIC BLOOD PRESSURE: 132 MMHG | OXYGEN SATURATION: 97 % | HEART RATE: 121 BPM | DIASTOLIC BLOOD PRESSURE: 95 MMHG | TEMPERATURE: 98 F

## 2018-07-25 PROBLEM — E78.5 HYPERLIPIDEMIA, UNSPECIFIED: Chronic | Status: ACTIVE | Noted: 2018-04-03

## 2018-07-25 PROBLEM — F41.9 ANXIETY DISORDER, UNSPECIFIED: Chronic | Status: ACTIVE | Noted: 2018-04-03

## 2018-07-25 PROBLEM — I48.91 UNSPECIFIED ATRIAL FIBRILLATION: Chronic | Status: ACTIVE | Noted: 2018-04-03

## 2018-07-25 PROBLEM — F10.10 ALCOHOL ABUSE, UNCOMPLICATED: Chronic | Status: ACTIVE | Noted: 2018-04-03

## 2018-07-25 PROBLEM — M19.90 UNSPECIFIED OSTEOARTHRITIS, UNSPECIFIED SITE: Chronic | Status: ACTIVE | Noted: 2018-04-03

## 2018-07-25 PROBLEM — F32.9 MAJOR DEPRESSIVE DISORDER, SINGLE EPISODE, UNSPECIFIED: Chronic | Status: ACTIVE | Noted: 2018-04-03

## 2018-07-25 PROBLEM — I10 ESSENTIAL (PRIMARY) HYPERTENSION: Chronic | Status: ACTIVE | Noted: 2018-04-03

## 2018-07-25 PROCEDURE — 99213 OFFICE O/P EST LOW 20 MIN: CPT

## 2019-01-24 ENCOUNTER — FORM ENCOUNTER (OUTPATIENT)
Age: 74
End: 2019-01-24

## 2019-01-25 ENCOUNTER — OUTPATIENT (OUTPATIENT)
Dept: OUTPATIENT SERVICES | Facility: HOSPITAL | Age: 74
LOS: 1 days | End: 2019-01-25
Payer: MEDICARE

## 2019-01-25 ENCOUNTER — APPOINTMENT (OUTPATIENT)
Dept: CT IMAGING | Facility: IMAGING CENTER | Age: 74
End: 2019-01-25
Payer: MEDICARE

## 2019-01-25 DIAGNOSIS — Z98.890 OTHER SPECIFIED POSTPROCEDURAL STATES: Chronic | ICD-10-CM

## 2019-01-25 DIAGNOSIS — C34.92 MALIGNANT NEOPLASM OF UNSPECIFIED PART OF LEFT BRONCHUS OR LUNG: ICD-10-CM

## 2019-01-25 PROCEDURE — 71250 CT THORAX DX C-: CPT

## 2019-01-25 PROCEDURE — 71250 CT THORAX DX C-: CPT | Mod: 26

## 2019-01-30 ENCOUNTER — APPOINTMENT (OUTPATIENT)
Dept: THORACIC SURGERY | Facility: CLINIC | Age: 74
End: 2019-01-30
Payer: MEDICARE

## 2019-01-30 VITALS
HEART RATE: 82 BPM | SYSTOLIC BLOOD PRESSURE: 132 MMHG | DIASTOLIC BLOOD PRESSURE: 86 MMHG | TEMPERATURE: 97.9 F | BODY MASS INDEX: 26.16 KG/M2 | RESPIRATION RATE: 16 BRPM | WEIGHT: 157 LBS | OXYGEN SATURATION: 98 % | HEIGHT: 65 IN

## 2019-01-30 PROCEDURE — 99213 OFFICE O/P EST LOW 20 MIN: CPT

## 2019-01-31 NOTE — PHYSICAL EXAM
[Sclera] : the sclera and conjunctiva were normal [Extraocular Movements] : extraocular movements were intact [Neck Cervical Mass (___cm)] : no neck mass was observed [Jugular Venous Distention Increased] : there was no jugular-venous distention [] : no respiratory distress [Respiration, Rhythm And Depth] : normal respiratory rhythm and effort [Exaggerated Use Of Accessory Muscles For Inspiration] : no accessory muscle use [Auscultation Breath Sounds / Voice Sounds] : lungs were clear to auscultation bilaterally [Chest Visual Inspection Thoracic Asymmetry] : no chest asymmetry [Diminished Respiratory Excursion] : normal chest expansion [1+] : left 1+ [Bowel Sounds] : normal bowel sounds [Abdomen Soft] : soft [Abdomen Tenderness] : non-tender [Cervical Lymph Nodes Enlarged Posterior Bilaterally] : posterior cervical [Cervical Lymph Nodes Enlarged Anterior Bilaterally] : anterior cervical [Supraclavicular Lymph Nodes Enlarged Bilaterally] : supraclavicular [No CVA Tenderness] : no ~M costovertebral angle tenderness [Involuntary Movements] : no involuntary movements were seen [Skin Color & Pigmentation] : normal skin color and pigmentation [No Focal Deficits] : no focal deficits [Oriented To Time, Place, And Person] : oriented to person, place, and time [Impaired Insight] : insight and judgment were intact [Affect] : the affect was normal [Mood] : the mood was normal [Abnormal Walk] : normal gait [FreeTextEntry1] : deferred

## 2019-01-31 NOTE — CONSULT LETTER
[Dear  ___] : Dear  [unfilled], [Courtesy Letter:] : I had the pleasure of seeing your patient, [unfilled], in my office today. [Please see my note below.] : Please see my note below. [Sincerely,] : Sincerely, [FreeTextEntry2] : Dr. Michael Guzman\par Dr. Ezio Lutz \par  \par  [FreeTextEntry3] : \par Zachary Arroyo MD, FACS \par , Division of Thoracic Surgery \par [facility] \par Chief, Thoracic Surgery \par [facility] \par Department of Cardiovascular & Thoracic Surgery \par  \par [facility] [employer] [facility] \par

## 2019-01-31 NOTE — REVIEW OF SYSTEMS
[As Noted in HPI] : as noted in HPI [Negative] : Heme/Lymph [FreeTextEntry9] : Chronic back pain. Knee pains pending xrays. Leg weakness has appt with Neurology.

## 2019-01-31 NOTE — ASSESSMENT
[FreeTextEntry1] : 74 year old female, former smoker s/p preoperative and post operative bronchoscopy with evacuation of secretions, Left video thoracoscopy anatomical lingula-sparing left upper lobectomy and lymph node dissection of the mediastinum on 4/6/18. Pathology revealing adenocarcinoma, acinar predominant T1b N0. She presents today for her 6 month follow up with CT scan. \par \par CT chest scan 1/25/19: Lingular sparing left upper lobe resection with associated postsurgical changes. Lungs otherwise clear. \par \par I have reviewed the images with the patient. I have recommended that the patient follow up in 6 months with a CT scan of the chest. Follow up with Dr. Guzman. I discussed with the patient that Pulmonary Rehab may be beneficial for her. She will ask Dr. Guzman his opinion. \par \par Written by Cheryl Nevarez NP, acting as a scribe for Zachary Cordero MD.\par The documentation recorded by the scribe accurately reflects the service I personally performed and the decisions made by me. ZACHARY CORDERO MD\par

## 2019-02-08 ENCOUNTER — APPOINTMENT (OUTPATIENT)
Dept: RADIOLOGY | Facility: IMAGING CENTER | Age: 74
End: 2019-02-08

## 2019-03-06 ENCOUNTER — APPOINTMENT (OUTPATIENT)
Dept: PULMONOLOGY | Facility: CLINIC | Age: 74
End: 2019-03-06
Payer: MEDICARE

## 2019-03-06 VITALS
WEIGHT: 160 LBS | BODY MASS INDEX: 27.31 KG/M2 | DIASTOLIC BLOOD PRESSURE: 69 MMHG | OXYGEN SATURATION: 97 % | RESPIRATION RATE: 14 BRPM | HEIGHT: 64 IN | HEART RATE: 61 BPM | SYSTOLIC BLOOD PRESSURE: 107 MMHG

## 2019-03-06 DIAGNOSIS — G89.18 OTHER ACUTE POSTPROCEDURAL PAIN: ICD-10-CM

## 2019-03-06 DIAGNOSIS — I48.91 UNSPECIFIED ATRIAL FIBRILLATION: ICD-10-CM

## 2019-03-06 DIAGNOSIS — R06.00 DYSPNEA, UNSPECIFIED: ICD-10-CM

## 2019-03-06 PROCEDURE — 95012 NITRIC OXIDE EXP GAS DETER: CPT

## 2019-03-06 PROCEDURE — 94727 GAS DIL/WSHOT DETER LNG VOL: CPT

## 2019-03-06 PROCEDURE — 94060 EVALUATION OF WHEEZING: CPT

## 2019-03-06 PROCEDURE — 94729 DIFFUSING CAPACITY: CPT

## 2019-03-06 PROCEDURE — 88738 HGB QUANT TRANSCUTANEOUS: CPT

## 2019-03-06 PROCEDURE — 99204 OFFICE O/P NEW MOD 45 MIN: CPT | Mod: 25

## 2019-03-07 PROBLEM — G89.18 POST-OPERATIVE PAIN: Status: ACTIVE | Noted: 2018-04-18

## 2019-03-07 NOTE — DISCUSSION/SUMMARY
[FreeTextEntry1] : Tabitha is a patient with exertional dyspnea, likely from a combination of atrial fibrillation and deconditioning

## 2019-03-07 NOTE — PROCEDURE
[FreeTextEntry1] : Pulmonary function test performed in office today: Lung volume is within normal limits; spirometry is within normal limits with some improvement post bronchodilator; diffusion showed moderate impairment. SpO2 at rest on room air is 98%.\par \par Exhaled nitric oxide level is 9 PPB\par \par Chest CT scan performed on January 25th, 2019 showed lingular sparing left upper lobe resection with associated postsurgical changes. Lungs otherwise clear.

## 2019-03-07 NOTE — ASSESSMENT
[FreeTextEntry1] : I referred her for Pulmonary rehabilitation at this point. Advised her to follow up with Dr. Lutz from Cardiology perspective.

## 2019-03-07 NOTE — CONSULT LETTER
[Dear  ___] : Dear  [unfilled], [Courtesy Letter:] : I had the pleasure of seeing your patient, [unfilled], in my office today. [Please see my note below.] : Please see my note below. [Consult Closing:] : Thank you very much for allowing me to participate in the care of this patient.  If you have any questions, please do not hesitate to contact me. [Sincerely,] : Sincerely, [DrConnie  ___] : Dr. BONDS [FreeTextEntry3] : Dr. Pat Manzo

## 2019-03-07 NOTE — HISTORY OF PRESENT ILLNESS
[FreeTextEntry1] : Tabitha is a pleasant 74-year-old female with history of atrial fibrillation on Xarelto, status post radiofrequency ablation and DC cardioversion, history of adenocarcinoma of her left lung, status post lingula sparing left upper lobectomy/leftVATS on April 6, 2018, A recent followup chest CT scan showed clear lungs, no lung nodules seen. She came in with complaint of exertional dyspnea, she still has mild left incisional pain at left VATS site. She does not have chest pain, cough, fever or chills

## 2019-04-30 NOTE — PROGRESS NOTE ADULT - ASSESSMENT
73 year old female with PMH of HTN, anxiety, depression, HLD, failed AFib with ablation x2 (4 years ago &  Nov 2017 at [facility])-on Xarelto, multiple cardioversion, recently diagnosed with L lung nodule pre-op L VATS/ wedge resection this Friday.  Patient presented with SVT found to have persistent AT/AFib with RVR up to 167bpm.  Considering she will be off AC therapy due to upcoming lung wedge resection this Friday, rate control strategy with AV farzad agents is recommended.  Rhythm control with DCCV & Sotalol therapy post-op is recommended.  She is currently  rate controlled on Diltiazem gtt.  Multaq discontinued yesterday.  -Continue Lovenox for AC for now  -Continue Diltiazem gtt for now for target HR <100  -Echocardiogram  -Will follow for rhythm control management after lung resection, Dr. Hamlin discussed plan with Patient's primary cardiologist/PCP Dr. Lutz and his colleague To get better and follow your care plan as instructed.

## 2019-06-12 ENCOUNTER — APPOINTMENT (OUTPATIENT)
Dept: PULMONOLOGY | Facility: CLINIC | Age: 74
End: 2019-06-12

## 2019-06-26 ENCOUNTER — APPOINTMENT (OUTPATIENT)
Dept: OTOLARYNGOLOGY | Facility: CLINIC | Age: 74
End: 2019-06-26

## 2019-07-01 ENCOUNTER — FORM ENCOUNTER (OUTPATIENT)
Age: 74
End: 2019-07-01

## 2019-07-02 ENCOUNTER — OUTPATIENT (OUTPATIENT)
Dept: OUTPATIENT SERVICES | Facility: HOSPITAL | Age: 74
LOS: 1 days | End: 2019-07-02
Payer: MEDICARE

## 2019-07-02 ENCOUNTER — APPOINTMENT (OUTPATIENT)
Dept: CT IMAGING | Facility: IMAGING CENTER | Age: 74
End: 2019-07-02
Payer: MEDICARE

## 2019-07-02 DIAGNOSIS — Z98.890 OTHER SPECIFIED POSTPROCEDURAL STATES: Chronic | ICD-10-CM

## 2019-07-02 DIAGNOSIS — C34.92 MALIGNANT NEOPLASM OF UNSPECIFIED PART OF LEFT BRONCHUS OR LUNG: ICD-10-CM

## 2019-07-02 PROCEDURE — 71250 CT THORAX DX C-: CPT | Mod: 26

## 2019-07-02 PROCEDURE — 71250 CT THORAX DX C-: CPT

## 2019-07-10 ENCOUNTER — APPOINTMENT (OUTPATIENT)
Dept: THORACIC SURGERY | Facility: CLINIC | Age: 74
End: 2019-07-10
Payer: MEDICARE

## 2019-07-10 VITALS
HEART RATE: 74 BPM | OXYGEN SATURATION: 96 % | DIASTOLIC BLOOD PRESSURE: 82 MMHG | RESPIRATION RATE: 15 BRPM | SYSTOLIC BLOOD PRESSURE: 134 MMHG

## 2019-07-10 PROCEDURE — 99213 OFFICE O/P EST LOW 20 MIN: CPT

## 2019-07-10 RX ORDER — CLONAZEPAM 1 MG/1
1 TABLET ORAL
Refills: 0 | Status: ACTIVE | COMMUNITY

## 2019-07-10 RX ORDER — ALPRAZOLAM 1 MG/1
1 TABLET ORAL 3 TIMES DAILY
Refills: 0 | Status: DISCONTINUED | COMMUNITY
End: 2019-07-10

## 2019-07-11 NOTE — CONSULT LETTER
[Dear  ___] : Dear  [unfilled], [Courtesy Letter:] : I had the pleasure of seeing your patient, [unfilled], in my office today. [Sincerely,] : Sincerely, [Please see my note below.] : Please see my note below. [FreeTextEntry2] : Dr. Michael Guzman\par Dr. Ezio Lutz \par  \par  [FreeTextEntry3] : \par \par \par Zachary Arroyo MD, FACS \par , Division of Thoracic Surgery \par [facility] \par Chief, Thoracic Surgery \par [facility] \par Department of Cardiovascular & Thoracic Surgery \par  \par [facility] [employer] [facility] \par

## 2019-07-11 NOTE — ASSESSMENT
[FreeTextEntry1] : 74 year old female, follow up. She is s/p Left VATS, anatomical lingula -sparing LULobectomy and MLND on 4/6/18, pathology revealing adenocarcinoma, acinar predominant, T1bN0.   CT Chest on 7/2/19 revealed:  No nodules, endobronchial lesions, or pleural effusions noted.\par \par I have reviewed the patient's medical records and diagnostic images during the time of this office visit, and I have made the following recommendation: Return to office for follow up with CT Chest in 1 year.\par \par \par Written by Izzy Ramirez NP, acting as a scribe for Zachary Cordero MD.\par \par The documentation recorded by the scribe accurately reflects the service I personally performed and the decisions made by me. ZACHARY CORDERO MD

## 2019-07-11 NOTE — HISTORY OF PRESENT ILLNESS
[FreeTextEntry1] : Ms. Dow is a 74 year old female who presents today for follow up. She is s/p Left VATS, anatomical lingula -sparing LULobectomy and MLND on 4/6/18, pathology revealing adenocarcinoma, acinar predominant, T1bN0.  She is a former smoker (1/3 PPD for 15 years having quit in 1980),with past medical history of hypertension, atrial fibrillation s/p ablation and cardioversion, chronic back pain depression and anxiety.\par \par CT Chest on 7/2/19 revealed:  No nodules, endobronchial lesions, or pleural effusions noted.\par \par She denies any fever, chills, cough, chest pain, hemoptysis, or recent illness. She reports traumatic fall at home 2 weeks ago that required overnight stay at [facility], evaluated by Trauma team, extensive bruising and discomfort to left leg.  She notes mild shortness of breath since fall.

## 2019-07-11 NOTE — PHYSICAL EXAM
[Sclera] : the sclera and conjunctiva were normal [PERRL With Normal Accommodation] : pupils were equal in size, round, and reactive to light [Neck Appearance] : the appearance of the neck was normal [] : no respiratory distress [Respiration, Rhythm And Depth] : normal respiratory rhythm and effort [Auscultation Breath Sounds / Voice Sounds] : lungs were clear to auscultation bilaterally [Murmurs] : no murmurs [Heart Sounds] : normal S1 and S2 [Irregularly Irregular] : the rhythm was irregularly irregular [Abdomen Soft] : soft [Edema] : there was no peripheral edema [Cervical Lymph Nodes Enlarged Posterior Bilaterally] : posterior cervical [Abdomen Tenderness] : non-tender [Supraclavicular Lymph Nodes Enlarged Bilaterally] : supraclavicular [Cervical Lymph Nodes Enlarged Anterior Bilaterally] : anterior cervical [Abnormal Walk] : normal gait [Skin Turgor] : normal skin turgor [No Focal Deficits] : no focal deficits [Oriented To Time, Place, And Person] : oriented to person, place, and time [Affect] : the affect was normal [Mood] : the mood was normal [FreeTextEntry1] : extensive bruising to left upper leg

## 2019-10-22 ENCOUNTER — OUTPATIENT (OUTPATIENT)
Dept: OUTPATIENT SERVICES | Facility: HOSPITAL | Age: 74
LOS: 1 days | End: 2019-10-22
Payer: MEDICARE

## 2019-10-22 ENCOUNTER — APPOINTMENT (OUTPATIENT)
Dept: ULTRASOUND IMAGING | Facility: IMAGING CENTER | Age: 74
End: 2019-10-22
Payer: MEDICARE

## 2019-10-22 DIAGNOSIS — N30.20 OTHER CHRONIC CYSTITIS WITHOUT HEMATURIA: ICD-10-CM

## 2019-10-22 DIAGNOSIS — Z98.890 OTHER SPECIFIED POSTPROCEDURAL STATES: Chronic | ICD-10-CM

## 2019-10-22 PROCEDURE — 76770 US EXAM ABDO BACK WALL COMP: CPT | Mod: 26

## 2019-10-22 PROCEDURE — 76770 US EXAM ABDO BACK WALL COMP: CPT

## 2019-11-18 ENCOUNTER — INPATIENT (INPATIENT)
Facility: HOSPITAL | Age: 74
LOS: 2 days | Discharge: HOME | DRG: 897 | End: 2019-11-21
Attending: HOSPITALIST | Admitting: HOSPITALIST
Payer: MEDICARE

## 2019-11-18 VITALS
WEIGHT: 149.91 LBS | TEMPERATURE: 98 F | SYSTOLIC BLOOD PRESSURE: 131 MMHG | HEIGHT: 69 IN | HEART RATE: 88 BPM | DIASTOLIC BLOOD PRESSURE: 90 MMHG | OXYGEN SATURATION: 94 % | RESPIRATION RATE: 18 BRPM

## 2019-11-18 DIAGNOSIS — F10.230 ALCOHOL DEPENDENCE WITH WITHDRAWAL, UNCOMPLICATED: ICD-10-CM

## 2019-11-18 DIAGNOSIS — Z98.890 OTHER SPECIFIED POSTPROCEDURAL STATES: Chronic | ICD-10-CM

## 2019-11-18 LAB
ALBUMIN SERPL ELPH-MCNC: 4.9 G/DL — SIGNIFICANT CHANGE UP (ref 3.3–5)
ALP SERPL-CCNC: 75 U/L — SIGNIFICANT CHANGE UP (ref 40–120)
ALT FLD-CCNC: 63 U/L — HIGH (ref 10–45)
ANION GAP SERPL CALC-SCNC: 23 MMOL/L — HIGH (ref 5–17)
AST SERPL-CCNC: 68 U/L — HIGH (ref 10–40)
BASOPHILS # BLD AUTO: 0.02 K/UL — SIGNIFICANT CHANGE UP (ref 0–0.2)
BASOPHILS NFR BLD AUTO: 0.3 % — SIGNIFICANT CHANGE UP (ref 0–2)
BILIRUB SERPL-MCNC: 0.4 MG/DL — SIGNIFICANT CHANGE UP (ref 0.2–1.2)
BUN SERPL-MCNC: 8 MG/DL — SIGNIFICANT CHANGE UP (ref 7–23)
CALCIUM SERPL-MCNC: 9.6 MG/DL — SIGNIFICANT CHANGE UP (ref 8.4–10.5)
CHLORIDE SERPL-SCNC: 99 MMOL/L — SIGNIFICANT CHANGE UP (ref 96–108)
CK SERPL-CCNC: 112 U/L — SIGNIFICANT CHANGE UP (ref 25–170)
CO2 SERPL-SCNC: 24 MMOL/L — SIGNIFICANT CHANGE UP (ref 22–31)
CREAT SERPL-MCNC: 0.48 MG/DL — LOW (ref 0.5–1.3)
EOSINOPHIL # BLD AUTO: 0.02 K/UL — SIGNIFICANT CHANGE UP (ref 0–0.5)
EOSINOPHIL NFR BLD AUTO: 0.3 % — SIGNIFICANT CHANGE UP (ref 0–6)
GLUCOSE SERPL-MCNC: 86 MG/DL — SIGNIFICANT CHANGE UP (ref 70–99)
HCT VFR BLD CALC: 44.1 % — SIGNIFICANT CHANGE UP (ref 34.5–45)
HGB BLD-MCNC: 14.7 G/DL — SIGNIFICANT CHANGE UP (ref 11.5–15.5)
IMM GRANULOCYTES NFR BLD AUTO: 0.3 % — SIGNIFICANT CHANGE UP (ref 0–1.5)
LYMPHOCYTES # BLD AUTO: 3.43 K/UL — HIGH (ref 1–3.3)
LYMPHOCYTES # BLD AUTO: 47.4 % — HIGH (ref 13–44)
MCHC RBC-ENTMCNC: 33.2 PG — SIGNIFICANT CHANGE UP (ref 27–34)
MCHC RBC-ENTMCNC: 33.3 GM/DL — SIGNIFICANT CHANGE UP (ref 32–36)
MCV RBC AUTO: 99.5 FL — SIGNIFICANT CHANGE UP (ref 80–100)
MONOCYTES # BLD AUTO: 0.5 K/UL — SIGNIFICANT CHANGE UP (ref 0–0.9)
MONOCYTES NFR BLD AUTO: 6.9 % — SIGNIFICANT CHANGE UP (ref 2–14)
NEUTROPHILS # BLD AUTO: 3.25 K/UL — SIGNIFICANT CHANGE UP (ref 1.8–7.4)
NEUTROPHILS NFR BLD AUTO: 44.8 % — SIGNIFICANT CHANGE UP (ref 43–77)
NRBC # BLD: 0 /100 WBCS — SIGNIFICANT CHANGE UP (ref 0–0)
PLATELET # BLD AUTO: 216 K/UL — SIGNIFICANT CHANGE UP (ref 150–400)
POTASSIUM SERPL-MCNC: 4.2 MMOL/L — SIGNIFICANT CHANGE UP (ref 3.5–5.3)
POTASSIUM SERPL-SCNC: 4.2 MMOL/L — SIGNIFICANT CHANGE UP (ref 3.5–5.3)
PROT SERPL-MCNC: 7.9 G/DL — SIGNIFICANT CHANGE UP (ref 6–8.3)
RBC # BLD: 4.43 M/UL — SIGNIFICANT CHANGE UP (ref 3.8–5.2)
RBC # FLD: 13.4 % — SIGNIFICANT CHANGE UP (ref 10.3–14.5)
SODIUM SERPL-SCNC: 146 MMOL/L — HIGH (ref 135–145)
WBC # BLD: 7.24 K/UL — SIGNIFICANT CHANGE UP (ref 3.8–10.5)
WBC # FLD AUTO: 7.24 K/UL — SIGNIFICANT CHANGE UP (ref 3.8–10.5)

## 2019-11-18 PROCEDURE — 70450 CT HEAD/BRAIN W/O DYE: CPT | Mod: 26

## 2019-11-18 PROCEDURE — 72125 CT NECK SPINE W/O DYE: CPT | Mod: 26

## 2019-11-18 PROCEDURE — 93010 ELECTROCARDIOGRAM REPORT: CPT

## 2019-11-18 PROCEDURE — 99285 EMERGENCY DEPT VISIT HI MDM: CPT | Mod: GC

## 2019-11-18 PROCEDURE — 71045 X-RAY EXAM CHEST 1 VIEW: CPT | Mod: 26

## 2019-11-18 PROCEDURE — 99223 1ST HOSP IP/OBS HIGH 75: CPT

## 2019-11-18 RX ORDER — SODIUM CHLORIDE 9 MG/ML
1000 INJECTION, SOLUTION INTRAVENOUS
Refills: 0 | Status: DISCONTINUED | OUTPATIENT
Start: 2019-11-18 | End: 2019-11-19

## 2019-11-18 RX ORDER — SODIUM CHLORIDE 9 MG/ML
1000 INJECTION, SOLUTION INTRAVENOUS ONCE
Refills: 0 | Status: COMPLETED | OUTPATIENT
Start: 2019-11-18 | End: 2019-11-18

## 2019-11-18 RX ADMIN — SODIUM CHLORIDE 1000 MILLILITER(S): 9 INJECTION, SOLUTION INTRAVENOUS at 19:53

## 2019-11-18 RX ADMIN — SODIUM CHLORIDE 150 MILLILITER(S): 9 INJECTION, SOLUTION INTRAVENOUS at 20:43

## 2019-11-18 RX ADMIN — Medication 25 MILLIGRAM(S): at 21:23

## 2019-11-18 RX ADMIN — SODIUM CHLORIDE 150 MILLILITER(S): 9 INJECTION, SOLUTION INTRAVENOUS at 20:01

## 2019-11-18 RX ADMIN — Medication 25 MILLIGRAM(S): at 19:17

## 2019-11-18 RX ADMIN — SODIUM CHLORIDE 1000 MILLILITER(S): 9 INJECTION, SOLUTION INTRAVENOUS at 19:04

## 2019-11-18 RX ADMIN — Medication 1 MILLIGRAM(S): at 20:00

## 2019-11-18 NOTE — ED PROVIDER NOTE - PROGRESS NOTE DETAILS
Patient stable. Pending labs, imaging at this time. Will be signed out to night team shortly - further care, management, and disposition per night team.   Carl Davidson MD, PGY3 Emergency Medicine

## 2019-11-18 NOTE — H&P ADULT - ATTENDING COMMENTS
NIGHT HOSPITALIST:    Patient aware of course and agrees with plan/care as above.   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   Emotional support provided to patient.   Family not presently in attendance and could not be reached by phone by examiner.   Care reviewed with covering NP/PA.   Care assumed by the DAY HOSPITALIST.    Arnel Terrell MD  [phone] NIGHT HOSPITALIST:    Patient aware of course and agrees with plan/care as above.   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   Emotional support provided to patient.   Family not presently in attendance and could not be reached by phone by examiner.   Care reviewed with covering NP/PA, Liliam.   Care assumed by the DAY HOSPITALIST.    Arnel Terrell MD  [phone]

## 2019-11-18 NOTE — ED ADULT NURSE NOTE - OBJECTIVE STATEMENT
Pt presents to the Ed via EMS for  ETOH intox, AXOX3, moving all extremities, pt reports 10 days of drinking ETOH with several periods of memory loss, pt rpeorts drinking wine regularly, having tremors when not drinking, or upon waking. Pt appears inebriated, speech slurred, reports last drink on EMS arrival prior to coming to ED, Pt reports not eating for 3 days, Pt reports anxiety, Pt "thinks she fell" and is reporting pain to back of head, no injury noted on visual exam. Pt breathing is unlabored, symmetrical, no shortness of breath, no chest pain, abdomen soft and nontender, no nausea vomiting or diarrhea, no fevers or chills at home.

## 2019-11-18 NOTE — H&P ADULT - NSICDXPASTSURGICALHX_GEN_ALL_CORE_FT
PAST SURGICAL HISTORY:  H/O prior ablation treatment cardiac 11/2017    History of cardioversion 12/2017

## 2019-11-18 NOTE — H&P ADULT - NSHPLABSRESULTS_GEN_ALL_CORE
WBC 7.2.  Hgb 14.7.  Platelets of 216K.    K+ 4.2.  Random glucose of 86.  Cr 0.4.  Alb 4.9.    EKG tracing reviewed with atrial fibrillation at 88.    AST 68  ALT  63.    Chest radiograph reviewed with no infiltrate or effusion.    CTT head with NO bleed or mass; query cavernoma in citlalli, recommend nonemergent MRI  Neck no fracture, nondiagnostic.

## 2019-11-18 NOTE — H&P ADULT - NSICDXPASTMEDICALHX_GEN_ALL_CORE_FT
PAST MEDICAL HISTORY:  Alcohol abuse     Anxiety     Arthritis     Atrial fibrillation     Depression     Dyslipidemia     Hypertension

## 2019-11-18 NOTE — ED PROVIDER NOTE - CLINICAL SUMMARY MEDICAL DECISION MAKING FREE TEXT BOX
74F, hx afib, lung ca, depression, arthritis, chronic alcohol abuse presents w chief complaint of alcohol abuse. Exam as above. Will CT head / neck due to recent fall. Librium, labs, CIWA. May require admit for alcohol withdrawal. Currently stable. Will continue to follow up and re-assess. Case discussed with Attending.  Carl Davidson MD, PGY3 Emergency Medicine

## 2019-11-18 NOTE — ED ADULT NURSE NOTE - NSIMPLEMENTINTERV_GEN_ALL_ED
Implemented All Fall with Harm Risk Interventions:  [place] to call system. Call bell, personal items and telephone within reach. Instruct patient to call for assistance. Room bathroom lighting operational. Non-slip footwear when patient is off stretcher. Physically safe environment: no spills, clutter or unnecessary equipment. Stretcher in lowest position, wheels locked, appropriate side rails in place. Provide visual cue, wrist band, yellow gown, etc. Monitor gait and stability. Monitor for mental status changes and reorient to person, place, and time. Review medications for side effects contributing to fall risk. Reinforce activity limits and safety measures with patient and family. Provide visual clues: red socks.

## 2019-11-18 NOTE — H&P ADULT - ASSESSMENT
NIGHT HOSPITALIST:   Presentation of patient with mild to moderate ethanol withdrawal in the setting of patient with heavy daily ethanol intake, complex medical history of LEFT upper lobe resection at [facility] for lung CA presumed to be disease free, chronic atrial fibrillation maintained on Xarelto, essential HTN but patient cannot recall her Rx or confirm medications from last year other than her Xarelto>>patient for Ativan taper at 2 mg schedule (patient's age apparently precludes Librium--  [place] in the [place] with patient on outpatient benzodiazepines-- patient is NOT a candidate for outpatient detox at present) and will provide Ativan for breakthrough.  No need for IVF at present, with patient already hypertensive, and chest discomfort likely from withdrawal.   EKG stat nondiagnostic.   Will upgrade to telemetry and obtain serial troponin.    Social work.   Patient presently refusing psychiatry referral but would review in the AM if patient agrees as such.    Will temporarily HOLD the venlafaxine until review by the DAY PROVIDER in the AM with patient already self medicating with ethanol.    Would clarify patient's outpatient medications with her office physician in the AM.  Will check digoxin level. NIGHT HOSPITALIST:   Presentation of patient with mild to moderate ethanol withdrawal in the setting of patient with heavy daily ethanol intake, complex medical history of LEFT upper lobe resection at [facility] for lung CA presumed to be disease free, chronic atrial fibrillation maintained on Xarelto, essential HTN but patient cannot recall her Rx or confirm medications from last year other than her Xarelto>>patient for Ativan taper at 2 mg schedule (patient's age apparently precludes Librium--  [place] in the [place] with patient on outpatient benzodiazepines-- patient is NOT a candidate for outpatient detox at present) and will provide Ativan for breakthrough.  No need for IVF at present, with patient already hypertensive, and chest discomfort likely from withdrawal.   EKG stat nondiagnostic.   Will upgrade to telemetry and obtain serial troponin.    Social work.   Patient presently refusing psychiatry referral but would review in the AM if patient agrees as such.    Will temporarily HOLD the venlafaxine until review by the DAY PROVIDER in the AM with patient already self medicating with ethanol.    Unclear to the significance of the pontine findings on head CTT.   Will defer to the DAY PROVIDER for review for non emergent MRI brain.    Would clarify patient's outpatient medications with her office physician in the AM.  Will check digoxin level.

## 2019-11-18 NOTE — H&P ADULT - NSHPSOURCEINFOTX_GEN_ALL_CORE
No family in attendance and general message left with patient's spouse by phone.  Patient with partial recall of medications. No family in attendance and general message left with patient's spouse by phone.  Patient with partial recall of medications.   Last Medex from [facility] from April 2018. No family in attendance and general message left with patient's spouse by phone.  Patient with partial recall of medications.   Left message with patient's daughter, Elizabeth, [phone]; other daughter, Esperanza, [phone] could not be reached by phone.  Last Medex from [facility] from April 2018.  [place] # [id number] in the chart.

## 2019-11-18 NOTE — ED ADULT NURSE REASSESSMENT NOTE - NS ED NURSE REASSESS COMMENT FT1
Report received from CASSIE Tomas. Pt is breathing unlabored on RA. Pt medicated as per MD order. Educated pt on plan of care. Safety and comfort maintained. Report received from CASSIE Tomas. Pt is breathing unlabored on RA. Pt medicated as per MD order. Educated pt on plan of care. Safety and comfort maintained. LR infusing as per MD order.

## 2019-11-18 NOTE — ED PROVIDER NOTE - PHYSICAL EXAMINATION
Left pupil > right pupil, chronic per patient Left pupil > right pupil, chronic per patient    General: awake, alert, oriented, no acute distress. Resting in bed. Appears intoxicated.   HEENT: PRRLA EOMI. No trauma/bruising noted to head or face EXCEPT SMALL HEMATOMA TO RIGHT POSTERIOR SKULL. No lip/tongue/throat swelling noted on exam.  CV: Regular rate and rhythm, S1/S2, no murmurs/rubs/gallops noted on exam. No tenderness to palpation to chest wall.  Lungs: Clear to ascultation bilaterally, no wheezes/crackles/rales noted on exam. Equal chest wall excursion noted.   Abdomen: Soft, non tender, non distended, no guarding or rebound. No CVA tenderness to palpation.   MSK: Full ROM of upper and lower extremities bilaterally. No tenderness to palpation to extremities. Full ROM of neck. No C-spine or spinal bony tenderness to palpation. No gross deformities noted to extremities. Pelvis stable.   Neuro: Awake, A+O x4, moving all extremities spontaneously. CN 2-12 grossly intact. No nystagmus noted. Strength and sensation grossly intact to all extremities. Mild BL hand tremors. No tongue fasciculations  Extremities: No swelling or edema noted to extremities. No calf tenderness to palpation.   Skin: No rash noted on exam.

## 2019-11-18 NOTE — H&P ADULT - PROBLEM SELECTOR PLAN 2
Patient aware of risks/benefits of full AC and agrees to continue with her Xarelto.    Would review patient's full medication list in the AM.

## 2019-11-18 NOTE — ED PROVIDER NOTE - ATTENDING CONTRIBUTION TO CARE
attending Pratik: 74yF h/o Afib on xarelto, lung ca, depression, arthritis, chronic alcohol abuse with prior withdrawal but denies prior seizures p/w "feeling shaky". Reports being on 10 day alcohol binge with feeling shaky today. Also with possible fall and head trauma but cannot recall exact events of last several days. Normally lives at home with  but he has been out of town. Reports prior rehab stays. Last drink prior to EMS arrival to her home. Intoxicated on arrival, anisocoria (pt reports baseline), no midline spinal tenderness. Will place on [facility] protocol, labs, CT imaging eval traumatic inury, admit

## 2019-11-18 NOTE — H&P ADULT - PROBLEM SELECTOR PLAN 1
See above.   CIWA protocol with Ativan taper with rescue.   Social work.   Patient refusing psychiatry referral but would revisit with patient in the AM.  Enhanced supervision.   Aspiration precautions, but patient wishes to eat. See above.   CIWA protocol with Ativan taper with rescue.   Social work.   Patient refusing psychiatry referral but would revisit with patient in the AM.  Enhanced supervision.   Aspiration precautions, but patient wishes to eat.    Will defer to the DAY PROVIDER review of the significance of the pontine findings--and will defer need for non urgent MRI brain.

## 2019-11-18 NOTE — H&P ADULT - NSHPSOCIALHISTORY_GEN_ALL_CORE
Quit cigarettes in her early twenties.  Ethanol as above.  See above.  Family not presently in attendance and could not be presently reached by phone.

## 2019-11-18 NOTE — ED PROVIDER NOTE - OBJECTIVE STATEMENT
74F, hx afib, lung ca, depression, arthritis, chronic alcohol abuse presents w chief complaint of alcohol abuse. patient reports extensive hx of alcohol abuse over the last 50+ years. Reports hx of withdrawal but denies hx seizures. Has been to rehab multiple times w/o success. States that her  was away the last 10 days and she has been binge drinking anything she found in house including beer and whiskey. Extensive drinks per day, cannot quantify. States she believes she fell one day this week, cannot recall when, but that he friend visited and found her on floor. She cannot recall how or when she fell, but does have bump to right posterior head. Patient called EMS today to come to ED. Reports feeling shaky and off balance. Last drink was prior to EMS arrival to bring patient to ED. Denies current headache, dizziness, blurry vision, chest pain, cough, shortness of breath, abdominal pain, weakness/numbness to extremities, urinary sx.   On xarelto for afib. No allergies.

## 2019-11-18 NOTE — H&P ADULT - HISTORY OF PRESENT ILLNESS
NIGHT HOSPITALIST:   Patient UNKNOWN to me previously, assigned to me at this point via the ER to admit this 73 y/o F--spouse/ adult daughter above not currently in attendance and could not be currently reached--unable to confirm patient's medications other than Xarelto for atrial fibrillation--patient with a history of apparently patient self admitted heavy daily intake of wine (one bottle daily) with last intake at 1300 in the afternoon in the setting of patient with chronic atrial fibrillation with multiple failed ablations and DC cardioversion, s/P LEFT upper lobe lobectomy for lung CA presumed to be disease free (discharged from [facility] in April 2018), depression, with patient reports that her spouse was away with patient with binge drinking with beer / wine/ whiskey.   Patient was found on the floor earlier in the week by a neighbor but did not seek medical attention until patient called EMS due to tremulousness, nausea and episode of chest discomfort.

## 2019-11-18 NOTE — H&P ADULT - PROBLEM SELECTOR PLAN 4
Transitions of Care Status:  1.  Name of PCP:   Eizo Lutz MD  2.  PCP Contacted on Admission: [ ] Y    [x ] N    3.  PCP contacted at Discharge: [ ] Y    [ ] N    [x ] N/A  4.  Post-Discharge Appointment Date and Location:  to be clarified upon DC  5.  Summary of Handoff given to PCP:  to be clarified upon DC.

## 2019-11-18 NOTE — H&P ADULT - NSHPPHYSICALEXAM_GEN_ALL_CORE
Physical exam with an elderly, chronically ill appearing, disheveled F    Afebrile.   HR  92.   RR 14.   BP  148/90  99% on RA    HEENT< PERRL< EOMI, no Hinds sign, no raccoon eye.  Oropharynx clear  Neck supple  No thyromegaly  Chest poor effort grossly clear  Cor irregular.  Declined breast exam  Abdomen soft nontender, normal bowel sounds, no rebound.  Skin clear.  Ext no gross deformities.  1+ B/L LE oedema.  Neurologic exam AXOx3.  Speech fluent.  Cognition grossly intact albeit poor short term recall.  UE/LE 5/5.  Cerebellar intact.  No suicidal or homicidal ideation.   Patient refuses psychiatry referral.

## 2019-11-18 NOTE — ED ADULT NURSE REASSESSMENT NOTE - NS ED NURSE REASSESS COMMENT FT1
Banana bag not available in [facility]. Spoke with pharmacist who states medication will be sent to ED GOLD tube station. Awaiting medication arrival.

## 2019-11-19 DIAGNOSIS — I48.11 LONGSTANDING PERSISTENT ATRIAL FIBRILLATION: ICD-10-CM

## 2019-11-19 DIAGNOSIS — Z02.9 ENCOUNTER FOR ADMINISTRATIVE EXAMINATIONS, UNSPECIFIED: ICD-10-CM

## 2019-11-19 DIAGNOSIS — F32.9 MAJOR DEPRESSIVE DISORDER, SINGLE EPISODE, UNSPECIFIED: ICD-10-CM

## 2019-11-19 DIAGNOSIS — I10 ESSENTIAL (PRIMARY) HYPERTENSION: ICD-10-CM

## 2019-11-19 DIAGNOSIS — G93.9 DISORDER OF BRAIN, UNSPECIFIED: ICD-10-CM

## 2019-11-19 DIAGNOSIS — F10.230 ALCOHOL DEPENDENCE WITH WITHDRAWAL, UNCOMPLICATED: ICD-10-CM

## 2019-11-19 LAB
ALBUMIN SERPL ELPH-MCNC: 4.2 G/DL — SIGNIFICANT CHANGE UP (ref 3.3–5)
ALP SERPL-CCNC: 63 U/L — SIGNIFICANT CHANGE UP (ref 40–120)
ALT FLD-CCNC: 50 U/L — HIGH (ref 10–45)
ANION GAP SERPL CALC-SCNC: 16 MMOL/L — SIGNIFICANT CHANGE UP (ref 5–17)
APPEARANCE UR: CLEAR — SIGNIFICANT CHANGE UP
APTT BLD: 21.9 SEC — LOW (ref 27.5–36.3)
AST SERPL-CCNC: 58 U/L — HIGH (ref 10–40)
BACTERIA # UR AUTO: NEGATIVE — SIGNIFICANT CHANGE UP
BASOPHILS # BLD AUTO: 0.02 K/UL — SIGNIFICANT CHANGE UP (ref 0–0.2)
BASOPHILS NFR BLD AUTO: 0.4 % — SIGNIFICANT CHANGE UP (ref 0–2)
BILIRUB DIRECT SERPL-MCNC: 0.1 MG/DL — SIGNIFICANT CHANGE UP (ref 0–0.2)
BILIRUB INDIRECT FLD-MCNC: 0.5 MG/DL — SIGNIFICANT CHANGE UP (ref 0.2–1)
BILIRUB SERPL-MCNC: 0.6 MG/DL — SIGNIFICANT CHANGE UP (ref 0.2–1.2)
BILIRUB UR-MCNC: NEGATIVE — SIGNIFICANT CHANGE UP
BUN SERPL-MCNC: 9 MG/DL — SIGNIFICANT CHANGE UP (ref 7–23)
CALCIUM SERPL-MCNC: 8.7 MG/DL — SIGNIFICANT CHANGE UP (ref 8.4–10.5)
CHLORIDE SERPL-SCNC: 102 MMOL/L — SIGNIFICANT CHANGE UP (ref 96–108)
CO2 SERPL-SCNC: 26 MMOL/L — SIGNIFICANT CHANGE UP (ref 22–31)
COLOR SPEC: YELLOW — SIGNIFICANT CHANGE UP
CREAT SERPL-MCNC: 0.54 MG/DL — SIGNIFICANT CHANGE UP (ref 0.5–1.3)
CULTURE RESULTS: SIGNIFICANT CHANGE UP
DIFF PNL FLD: ABNORMAL
DIGOXIN SERPL-MCNC: <0.4 NG/ML — LOW (ref 0.8–2)
EOSINOPHIL # BLD AUTO: 0.1 K/UL — SIGNIFICANT CHANGE UP (ref 0–0.5)
EOSINOPHIL NFR BLD AUTO: 2 % — SIGNIFICANT CHANGE UP (ref 0–6)
EPI CELLS # UR: 7 /HPF — HIGH
GLUCOSE SERPL-MCNC: 128 MG/DL — HIGH (ref 70–99)
GLUCOSE UR QL: NEGATIVE — SIGNIFICANT CHANGE UP
HCT VFR BLD CALC: 38.4 % — SIGNIFICANT CHANGE UP (ref 34.5–45)
HCV AB S/CO SERPL IA: 0.17 S/CO — SIGNIFICANT CHANGE UP (ref 0–0.99)
HCV AB SERPL-IMP: SIGNIFICANT CHANGE UP
HGB BLD-MCNC: 12.7 G/DL — SIGNIFICANT CHANGE UP (ref 11.5–15.5)
HYALINE CASTS # UR AUTO: 2 /LPF — SIGNIFICANT CHANGE UP (ref 0–2)
IMM GRANULOCYTES NFR BLD AUTO: 0.4 % — SIGNIFICANT CHANGE UP (ref 0–1.5)
INR BLD: 1.04 RATIO — SIGNIFICANT CHANGE UP (ref 0.88–1.16)
KETONES UR-MCNC: ABNORMAL
LEUKOCYTE ESTERASE UR-ACNC: ABNORMAL
LYMPHOCYTES # BLD AUTO: 1.94 K/UL — SIGNIFICANT CHANGE UP (ref 1–3.3)
LYMPHOCYTES # BLD AUTO: 37.9 % — SIGNIFICANT CHANGE UP (ref 13–44)
MAGNESIUM SERPL-MCNC: 1.5 MG/DL — LOW (ref 1.6–2.6)
MAGNESIUM SERPL-MCNC: 2.1 MG/DL — SIGNIFICANT CHANGE UP (ref 1.6–2.6)
MCHC RBC-ENTMCNC: 32.3 PG — SIGNIFICANT CHANGE UP (ref 27–34)
MCHC RBC-ENTMCNC: 33.1 GM/DL — SIGNIFICANT CHANGE UP (ref 32–36)
MCV RBC AUTO: 97.7 FL — SIGNIFICANT CHANGE UP (ref 80–100)
MONOCYTES # BLD AUTO: 0.55 K/UL — SIGNIFICANT CHANGE UP (ref 0–0.9)
MONOCYTES NFR BLD AUTO: 10.7 % — SIGNIFICANT CHANGE UP (ref 2–14)
NEUTROPHILS # BLD AUTO: 2.49 K/UL — SIGNIFICANT CHANGE UP (ref 1.8–7.4)
NEUTROPHILS NFR BLD AUTO: 48.6 % — SIGNIFICANT CHANGE UP (ref 43–77)
NITRITE UR-MCNC: NEGATIVE — SIGNIFICANT CHANGE UP
PH UR: 6 — SIGNIFICANT CHANGE UP (ref 5–8)
PHOSPHATE SERPL-MCNC: 1.4 MG/DL — LOW (ref 2.5–4.5)
PHOSPHATE SERPL-MCNC: 1.9 MG/DL — LOW (ref 2.5–4.5)
PLATELET # BLD AUTO: 172 K/UL — SIGNIFICANT CHANGE UP (ref 150–400)
POTASSIUM SERPL-MCNC: 3.6 MMOL/L — SIGNIFICANT CHANGE UP (ref 3.5–5.3)
POTASSIUM SERPL-SCNC: 3.6 MMOL/L — SIGNIFICANT CHANGE UP (ref 3.5–5.3)
PROT SERPL-MCNC: 7 G/DL — SIGNIFICANT CHANGE UP (ref 6–8.3)
PROT UR-MCNC: ABNORMAL
PROTHROM AB SERPL-ACNC: 12 SEC — SIGNIFICANT CHANGE UP (ref 10–12.9)
RBC # BLD: 3.93 M/UL — SIGNIFICANT CHANGE UP (ref 3.8–5.2)
RBC # FLD: 12.9 % — SIGNIFICANT CHANGE UP (ref 10.3–14.5)
RBC CASTS # UR COMP ASSIST: 5 /HPF — HIGH (ref 0–4)
SODIUM SERPL-SCNC: 144 MMOL/L — SIGNIFICANT CHANGE UP (ref 135–145)
SP GR SPEC: 1.02 — SIGNIFICANT CHANGE UP (ref 1.01–1.02)
SPECIMEN SOURCE: SIGNIFICANT CHANGE UP
TROPONIN T, HIGH SENSITIVITY RESULT: 6 NG/L — SIGNIFICANT CHANGE UP (ref 0–51)
TSH SERPL-MCNC: 3.94 UIU/ML — SIGNIFICANT CHANGE UP (ref 0.27–4.2)
UROBILINOGEN FLD QL: NEGATIVE — SIGNIFICANT CHANGE UP
WBC # BLD: 5.12 K/UL — SIGNIFICANT CHANGE UP (ref 3.8–10.5)
WBC # FLD AUTO: 5.12 K/UL — SIGNIFICANT CHANGE UP (ref 3.8–10.5)
WBC UR QL: 12 /HPF — HIGH (ref 0–5)

## 2019-11-19 PROCEDURE — 99233 SBSQ HOSP IP/OBS HIGH 50: CPT

## 2019-11-19 RX ORDER — RIVAROXABAN 15 MG-20MG
20 KIT ORAL
Refills: 0 | Status: DISCONTINUED | OUTPATIENT
Start: 2019-11-19 | End: 2019-11-21

## 2019-11-19 RX ORDER — INFLUENZA VIRUS VACCINE 15; 15; 15; 15 UG/.5ML; UG/.5ML; UG/.5ML; UG/.5ML
0.5 SUSPENSION INTRAMUSCULAR ONCE
Refills: 0 | Status: DISCONTINUED | OUTPATIENT
Start: 2019-11-19 | End: 2019-11-21

## 2019-11-19 RX ORDER — POTASSIUM PHOSPHATE, MONOBASIC POTASSIUM PHOSPHATE, DIBASIC 236; 224 MG/ML; MG/ML
15 INJECTION, SOLUTION INTRAVENOUS ONCE
Refills: 0 | Status: DISCONTINUED | OUTPATIENT
Start: 2019-11-19 | End: 2019-11-21

## 2019-11-19 RX ORDER — ACETAMINOPHEN 500 MG
2 TABLET ORAL
Qty: 0 | Refills: 0 | DISCHARGE

## 2019-11-19 RX ORDER — MAGNESIUM SULFATE 500 MG/ML
2 VIAL (ML) INJECTION ONCE
Refills: 0 | Status: COMPLETED | OUTPATIENT
Start: 2019-11-19 | End: 2019-11-19

## 2019-11-19 RX ORDER — DILTIAZEM HCL 120 MG
120 CAPSULE, EXT RELEASE 24 HR ORAL DAILY
Refills: 0 | Status: DISCONTINUED | OUTPATIENT
Start: 2019-11-19 | End: 2019-11-21

## 2019-11-19 RX ORDER — THIAMINE MONONITRATE (VIT B1) 100 MG
100 TABLET ORAL DAILY
Refills: 0 | Status: DISCONTINUED | OUTPATIENT
Start: 2019-11-19 | End: 2019-11-21

## 2019-11-19 RX ORDER — POTASSIUM PHOSPHATE, MONOBASIC POTASSIUM PHOSPHATE, DIBASIC 236; 224 MG/ML; MG/ML
15 INJECTION, SOLUTION INTRAVENOUS ONCE
Refills: 0 | Status: COMPLETED | OUTPATIENT
Start: 2019-11-19 | End: 2019-11-19

## 2019-11-19 RX ORDER — METOPROLOL TARTRATE 50 MG
50 TABLET ORAL DAILY
Refills: 0 | Status: DISCONTINUED | OUTPATIENT
Start: 2019-11-19 | End: 2019-11-21

## 2019-11-19 RX ORDER — VENLAFAXINE HCL 75 MG
150 CAPSULE, EXT RELEASE 24 HR ORAL DAILY
Refills: 0 | Status: DISCONTINUED | OUTPATIENT
Start: 2019-11-19 | End: 2019-11-21

## 2019-11-19 RX ORDER — FOLIC ACID 0.8 MG
1 TABLET ORAL DAILY
Refills: 0 | Status: DISCONTINUED | OUTPATIENT
Start: 2019-11-19 | End: 2019-11-21

## 2019-11-19 RX ADMIN — Medication 2 MILLIGRAM(S): at 12:06

## 2019-11-19 RX ADMIN — Medication 1 TABLET(S): at 12:06

## 2019-11-19 RX ADMIN — Medication 2 MILLIGRAM(S): at 05:01

## 2019-11-19 RX ADMIN — RIVAROXABAN 20 MILLIGRAM(S): KIT at 17:57

## 2019-11-19 RX ADMIN — Medication 1 MILLIGRAM(S): at 11:49

## 2019-11-19 RX ADMIN — Medication 50 GRAM(S): at 03:23

## 2019-11-19 RX ADMIN — POTASSIUM PHOSPHATE, MONOBASIC POTASSIUM PHOSPHATE, DIBASIC 62.5 MILLIMOLE(S): 236; 224 INJECTION, SOLUTION INTRAVENOUS at 04:23

## 2019-11-19 RX ADMIN — RIVAROXABAN 20 MILLIGRAM(S): KIT at 01:15

## 2019-11-19 RX ADMIN — Medication 2 MILLIGRAM(S): at 14:28

## 2019-11-19 RX ADMIN — Medication 2 MILLIGRAM(S): at 02:16

## 2019-11-19 RX ADMIN — Medication 50 MILLIGRAM(S): at 15:23

## 2019-11-19 RX ADMIN — Medication 2 MILLIGRAM(S): at 17:57

## 2019-11-19 RX ADMIN — Medication 2 MILLIGRAM(S): at 00:25

## 2019-11-19 RX ADMIN — Medication 120 MILLIGRAM(S): at 15:23

## 2019-11-19 RX ADMIN — Medication 100 MILLIGRAM(S): at 11:49

## 2019-11-19 RX ADMIN — Medication 1.5 MILLIGRAM(S): at 23:55

## 2019-11-19 RX ADMIN — Medication 2 MILLIGRAM(S): at 11:49

## 2019-11-19 NOTE — ED ADULT NURSE REASSESSMENT NOTE - NS ED NURSE REASSESS COMMENT FT1
Pt is in no current distress. Comfort and safety provided. Callbell within reach. Bed at lowest position with both rails up for safety. Awaiting bed assignment.

## 2019-11-19 NOTE — PROGRESS NOTE ADULT - SUBJECTIVE AND OBJECTIVE BOX
Zachary Oconnor MD  Pager [phone]  [place] [phone]  Cell Phone [phone]    Patient is a 74y old  Female who presents with a chief complaint of Tremulousness from today following alcohol binge, chest pressure. (2019 23:37)        SUBJECTIVE / OVERNIGHT EVENTS: Patient feeling tremulous currently. Denies visual, auditory or tactile hallucinations.   TELEMETRY: AF   CIWA 8      MEDICATIONS  (STANDING):  folic acid 1 milliGRAM(s) Oral daily  influenza   Vaccine 0.5 milliLiter(s) IntraMuscular once  LORazepam     Tablet   Oral   LORazepam     Tablet 2 milliGRAM(s) Oral every 4 hours  LORazepam     Tablet 1.5 milliGRAM(s) Oral every 4 hours  multivitamin 1 Tablet(s) Oral daily  rivaroxaban 20 milliGRAM(s) Oral with dinner  thiamine 100 milliGRAM(s) Oral daily    MEDICATIONS  (PRN):  LORazepam     Tablet 2 milliGRAM(s) Oral every 2 hours PRN Symptom-triggered: 2 point increase in CIWA -Ar score and a total score of 7 or LESS  LORazepam   Injectable 2 milliGRAM(s) IV Push every 1 hour PRN Symptom-triggered: each CIWA -Ar score 8 or GREATER      Vital Signs Last 24 Hrs  T(C): 36.6 (2019 11:55), Max: 36.8 (2019 07:13)  T(F): 97.8 (2019 11:55), Max: 98.2 (2019 07:13)  HR: 99 (2019 11:55) (72 - 100)  BP: 158/105 (2019 11:55) (114/101 - 158/105)  BP(mean): 107 (2019 05:00) (90 - 134)  RR: 18 (2019 11:55) (14 - 20)  SpO2: 97% (2019 11:55) (94% - 100%)  CAPILLARY BLOOD GLUCOSE      POCT Blood Glucose.: 75 mg/dL (2019 17:31)    I&O's Summary        PHYSICAL EXAM  GENERAL: NAD, well-developed  HEAD:  Atraumatic, Normocephalic  EYES: EOMI, PERRLA, conjunctiva and sclera clear  CHEST/LUNG: Clear to auscultation bilaterally; No wheeze  HEART: Irregular, +S1+S2  ABDOMEN: Soft, Nontender, Nondistended; Bowel sounds present  EXTREMITIES:  2+ Peripheral Pulses, No clubbing, cyanosis, or edema  PSYCH: AAOx3      LABS:                        12.7   5.12  )-----------( 172      ( 2019 08:21 )             38.4         144  |  102  |  9   ----------------------------<  128<H>  3.6   |  26  |  0.54    Ca    8.7      2019 01:26  Phos  1.9       Mg     2.1         TPro  7.0  /  Alb  4.2  /  TBili  0.6  /  DBili  0.1  /  AST  58<H>  /  ALT  50<H>  /  AlkPhos  63      PT/INR - ( 2019 01:27 )   PT: 12.0 sec;   INR: 1.04 ratio         PTT - ( 2019 01:27 )  PTT:21.9 sec  CARDIAC MARKERS ( 2019 17:55 )  x     / x     / 112 U/L / x     / x          Urinalysis Basic - ( 2019 23:39 )    Color: Yellow / Appearance: Clear / S.016 / pH: x  Gluc: x / Ketone: Small  / Bili: Negative / Urobili: Negative   Blood: x / Protein: 30 mg/dL / Nitrite: Negative   Leuk Esterase: Moderate / RBC: 5 /hpf / WBC 12 /HPF   Sq Epi: x / Non Sq Epi: 7 /hpf / Bacteria: Negative          RADIOLOGY & ADDITIONAL TESTS:    Imaging Personally Reviewed:  Consultant(s) Notes Reviewed:    Care Discussed with Consultants/Other Providers:

## 2019-11-19 NOTE — PROGRESS NOTE ADULT - PROBLEM SELECTOR PLAN 1
Cont CIWA protocol with Ativan taper with rescue.   Social work following.  Patient refusing psychiatry referral

## 2019-11-19 NOTE — PROGRESS NOTE ADULT - PROBLEM SELECTOR PLAN 3
Julio calcification seen on CT head  Consider non-emergent MR of brain when patient less tremulous and able to tolerate

## 2019-11-19 NOTE — SBIRT NOTE ADULT - NSSBIRTALCPASSREFTXDET_GEN_A_CORE
Patient has been admitted on [facility] protocol.  Referral to treatment once cleared for discharge.

## 2019-11-19 NOTE — PROGRESS NOTE ADULT - PROBLEM SELECTOR PLAN 6
Transitions of Care Status:  1.  Name of PCP:   Ezio uLtz MD  2.  PCP Contacted on Admission: [ ] Y    [x ] N    3.  PCP contacted at Discharge: [ ] Y    [ ] N    [x ] N/A  4.  Post-Discharge Appointment Date and Location:  to be clarified upon DC  5.  Summary of Handoff given to PCP:  to be clarified upon DC.

## 2019-11-19 NOTE — ED ADULT NURSE REASSESSMENT NOTE - NS ED NURSE REASSESS COMMENT FT1
Pt is breathing unlabored on RA. VSS. CIWA 2. Pt medicated and blood work sent as per MD order. Educated pt on plan of care. Safety and comfort maintained. Pt admitted to tele, awaiting a bed. Call bell within reach. ID band in place. Cardiac monitor in place.

## 2019-11-20 LAB
ANION GAP SERPL CALC-SCNC: 15 MMOL/L — SIGNIFICANT CHANGE UP (ref 5–17)
BUN SERPL-MCNC: 8 MG/DL — SIGNIFICANT CHANGE UP (ref 7–23)
CALCIUM SERPL-MCNC: 9.1 MG/DL — SIGNIFICANT CHANGE UP (ref 8.4–10.5)
CHLORIDE SERPL-SCNC: 102 MMOL/L — SIGNIFICANT CHANGE UP (ref 96–108)
CO2 SERPL-SCNC: 24 MMOL/L — SIGNIFICANT CHANGE UP (ref 22–31)
CREAT SERPL-MCNC: 0.44 MG/DL — LOW (ref 0.5–1.3)
GLUCOSE SERPL-MCNC: 93 MG/DL — SIGNIFICANT CHANGE UP (ref 70–99)
HCT VFR BLD CALC: 36.5 % — SIGNIFICANT CHANGE UP (ref 34.5–45)
HGB BLD-MCNC: 12.2 G/DL — SIGNIFICANT CHANGE UP (ref 11.5–15.5)
MAGNESIUM SERPL-MCNC: 1.7 MG/DL — SIGNIFICANT CHANGE UP (ref 1.6–2.6)
MAGNESIUM SERPL-MCNC: 1.7 MG/DL — SIGNIFICANT CHANGE UP (ref 1.6–2.6)
MCHC RBC-ENTMCNC: 33.1 PG — SIGNIFICANT CHANGE UP (ref 27–34)
MCHC RBC-ENTMCNC: 33.4 GM/DL — SIGNIFICANT CHANGE UP (ref 32–36)
MCV RBC AUTO: 98.9 FL — SIGNIFICANT CHANGE UP (ref 80–100)
PHOSPHATE SERPL-MCNC: 3 MG/DL — SIGNIFICANT CHANGE UP (ref 2.5–4.5)
PHOSPHATE SERPL-MCNC: 3 MG/DL — SIGNIFICANT CHANGE UP (ref 2.5–4.5)
PLATELET # BLD AUTO: 148 K/UL — LOW (ref 150–400)
POTASSIUM SERPL-MCNC: 3.4 MMOL/L — LOW (ref 3.5–5.3)
POTASSIUM SERPL-SCNC: 3.4 MMOL/L — LOW (ref 3.5–5.3)
RBC # BLD: 3.69 M/UL — LOW (ref 3.8–5.2)
RBC # FLD: 13.1 % — SIGNIFICANT CHANGE UP (ref 10.3–14.5)
SODIUM SERPL-SCNC: 141 MMOL/L — SIGNIFICANT CHANGE UP (ref 135–145)
WBC # BLD: 4.51 K/UL — SIGNIFICANT CHANGE UP (ref 3.8–10.5)
WBC # FLD AUTO: 4.51 K/UL — SIGNIFICANT CHANGE UP (ref 3.8–10.5)

## 2019-11-20 PROCEDURE — 93306 TTE W/DOPPLER COMPLETE: CPT | Mod: 26

## 2019-11-20 PROCEDURE — 99233 SBSQ HOSP IP/OBS HIGH 50: CPT

## 2019-11-20 RX ORDER — POTASSIUM CHLORIDE 20 MEQ
20 PACKET (EA) ORAL ONCE
Refills: 0 | Status: COMPLETED | OUTPATIENT
Start: 2019-11-20 | End: 2019-11-20

## 2019-11-20 RX ADMIN — Medication 20 MILLIEQUIVALENT(S): at 14:04

## 2019-11-20 RX ADMIN — Medication 50 MILLIGRAM(S): at 05:53

## 2019-11-20 RX ADMIN — Medication 1.5 MILLIGRAM(S): at 10:41

## 2019-11-20 RX ADMIN — Medication 1.5 MILLIGRAM(S): at 17:33

## 2019-11-20 RX ADMIN — Medication 1.5 MILLIGRAM(S): at 02:56

## 2019-11-20 RX ADMIN — Medication 1.5 MILLIGRAM(S): at 05:53

## 2019-11-20 RX ADMIN — Medication 1 MILLIGRAM(S): at 21:53

## 2019-11-20 RX ADMIN — Medication 1 MILLIGRAM(S): at 13:25

## 2019-11-20 RX ADMIN — Medication 1.5 MILLIGRAM(S): at 14:04

## 2019-11-20 RX ADMIN — Medication 1 TABLET(S): at 13:26

## 2019-11-20 RX ADMIN — Medication 100 MILLIGRAM(S): at 13:26

## 2019-11-20 RX ADMIN — Medication 120 MILLIGRAM(S): at 05:53

## 2019-11-20 RX ADMIN — Medication 150 MILLIGRAM(S): at 13:26

## 2019-11-20 RX ADMIN — RIVAROXABAN 20 MILLIGRAM(S): KIT at 17:33

## 2019-11-20 NOTE — DIETITIAN INITIAL EVALUATION ADULT. - REASON INDICATOR FOR ASSESSMENT
Nutrition consult for bariatric surgery and nutrition support.  Per pt no history of bariatric surgery.  Information obtained from patient and medical record.

## 2019-11-20 NOTE — DIETITIAN INITIAL EVALUATION ADULT. - ENERGY NEEDS
Per medical record pt with PMH: A-fib, S/P cardioversion (2017), dyslipidemia, HTN, anxiety, depression, alcohol abuse, lung CA S/P upper left lobectomy (now disease free), presenting with binge alcohol consumption, found with alcohol withdrawal syndrome without complication.

## 2019-11-20 NOTE — DIETITIAN INITIAL EVALUATION ADULT. - OTHER INFO
Per medical record pt with history of alcohol abuse. Pt reports good appetite and PO intake PTA. Reports usual diet at home is no breakfast, larger late lunch usually pasta with homemade sauce and vegetables, sometimes with chicken meatballs, and a light dinner of cereal or a sandwich with some fruit and yogurt. Reports not adding salt to food. Pt reports taking Centrum Silver at home. Pt reports NKFA.   Pt reports decreased appetite because of hospital setting. However, reports having eaten most (90%) of her breakfast tray today (11/20). Pt denies chewing and swallowing difficulty. Reports diarrhea today following breakfast. States "it's the medications". Pt reports UBW about 2 years ago was 170 pounds. Pt reports weight loss following cancer surgery 2 years ago but is "happy with that". Current weight per medical record is 149.6 pounds (11/18). Weight loss of about 20 pounds (11.7%).  Pt given and amenable to education about healthy eating and low sodium nutrition therapy. Discussed balanced meals with examples of each food group, low fat protein options, and low sodium foods including no added salt in diet. Per medical record pt with history of alcohol abuse. Pt reports good appetite and PO intake PTA. Reports usual diet at home is no breakfast, larger late lunch usually pasta with homemade sauce and vegetables, sometimes with chicken meatballs, and a light dinner of cereal or a sandwich with some fruit and yogurt. Reports not adding salt to food. Pt reports taking Centrum Silver at home. Pt reports NKFA.   Pt reports decreased appetite because of hospital setting. However, reports having eaten most (90%) of her breakfast tray today (11/20). Pt denies chewing and swallowing difficulty. Reports diarrhea today following breakfast. States "it's the medications". Pt reports UBW about 2 years ago was 170 pounds. Pt reports weight loss following cancer surgery 2 years ago but is "happy with that". Current weight per medical record is 149.6 pounds (11/18). Weight loss of about 20 pounds (11.7%).  Pt given and amenable to education about general healthy eating and low sodium nutrition therapy. Discussed balanced meals with examples of each food group, low fat protein options, and low sodium foods including no added salt in diet. Per medical record pt with history of alcohol abuse; Pt binge drinks >4x/week. Pt reports good appetite and PO intake PTA. Reports usual diet at home is no breakfast, larger late lunch usually pasta with homemade sauce and vegetables, sometimes with chicken meatballs, and a light dinner of cereal or a sandwich with some fruit and yogurt. States sometimes she eats lunch earlier and then has larger meal later. Reports not adding salt to food. Pt reports taking Centrum Silver at home. Pt reports NKFA.   Pt reports decreased appetite because of hospital setting. However, reports having eaten most (90%) of her breakfast tray today (11/20). Pt denies chewing and swallowing difficulty. Reports diarrhea today following breakfast. States "it's the medications". Pt reports UBW about 2 years ago was 170 pounds. Pt reports weight loss following cancer surgery 2 years ago but is "happy with that". Current weight per medical record is 149.6 pounds (11/18). Weight loss of about 20 pounds (11.7%).  Pt given and amenable to education about general healthy eating and low sodium nutrition therapy. Discussed balanced meals with examples of each food group, low fat protein options, and low sodium foods including no added salt in diet. Per medical record pt with history of alcohol abuse; Pt binge drinks >4x/week. Pt reports good appetite and PO intake PTA. Reports usual diet at home is no breakfast, larger late lunch usually pasta with homemade sauce and vegetables, sometimes with chicken meatballs, and a light dinner of cereal or a sandwich with some fruit and yogurt. States sometimes she eats lunch earlier and then has larger meal later. Reports not adding salt to food "except for soups and tomato sauce". Pt reports taking Centrum Silver at home. Pt reports NKFA.   Pt reports decreased appetite because of hospital setting. However, reports having eaten most (90%) of her breakfast tray today (11/20). Pt denies chewing and swallowing difficulty. Reports diarrhea today following breakfast. States "it's the medications". Pt reports UBW about 2 years ago was 170 pounds. Pt reports weight loss following cancer surgery 2 years ago but is "happy with that". Current weight per medical record is 149.6 pounds (11/18). Weight loss of about 20 pounds (11.7%).  Pt given and amenable to education about general healthy eating and low sodium nutrition therapy. Discussed balanced meals with examples of each food group, low fat protein options, and low sodium foods including no added salt in diet.

## 2019-11-20 NOTE — PROGRESS NOTE ADULT - PROBLEM SELECTOR PLAN 3
Julio calcification seen on CT head  Consider non-emergent MR of brain when patient less tremulous and able to tolerate - can be done as outpatient

## 2019-11-20 NOTE — CHART NOTE - NSCHARTNOTEFT_GEN_A_CORE
Upon Nutritional Assessment by the Registered Dietitian your patient was determined to meet criteria / has evidence of the following diagnosis/diagnoses:          [ ]  Mild Protein Calorie Malnutrition        [X ]  Moderate Protein Calorie Malnutrition        [ ] Severe Protein Calorie Malnutrition        [ ] Unspecified Protein Calorie Malnutrition        [ ] Underweight / BMI <19        [ ] Morbid Obesity / BMI > 40      Findings as based on:  [x] Comprehensive nutrition assessment: Irregular meal patterns; EtOH abuse     [ ] Nutrition Focused Physical Exam  [x] Other: unintentional weight loss of 12% x 2 years      Nutrition Plan/Recommendations:    1. Monitor PO intake, weight, labs, and further educational needs.  2. Encourage and reinforce healthy eating pattern and low sodium diet.  3. Educated pt on general healthy eating and low sodium nutrition therapy.        PROVIDER Section:     By signing this assessment you are acknowledging and agree with the diagnosis/diagnoses assigned by the Registered Dietitian    Comments:

## 2019-11-20 NOTE — DIETITIAN INITIAL EVALUATION ADULT. - PROBLEM SELECTOR PLAN 1
See above.   CIWA protocol with Ativan taper with rescue.   Social work.   Patient refusing psychiatry referral but would revisit with patient in the AM.  Enhanced supervision.   Aspiration precautions, but patient wishes to eat.    Will defer to the DAY PROVIDER review of the significance of the pontine findings--and will defer need for non urgent MRI brain.

## 2019-11-20 NOTE — DIETITIAN INITIAL EVALUATION ADULT. - PROBLEM SELECTOR PLAN 4
Transitions of Care Status:  1.  Name of PCP:   Ezio Lutz MD  2.  PCP Contacted on Admission: [ ] Y    [x ] N    3.  PCP contacted at Discharge: [ ] Y    [ ] N    [x ] N/A  4.  Post-Discharge Appointment Date and Location:  to be clarified upon DC  5.  Summary of Handoff given to PCP:  to be clarified upon DC.

## 2019-11-20 NOTE — PROGRESS NOTE ADULT - PROBLEM SELECTOR PLAN 1
Cont CIWA protocol with Ativan taper and ativan prn symptom trigger.    Social work following.  Patient refusing psychiatry referral

## 2019-11-20 NOTE — PROGRESS NOTE ADULT - PROBLEM SELECTOR PLAN 6
Transitions of Care Status:  1.  Name of PCP:   Ezio Lutz MD  2.  PCP Contacted on Admission: [ ] Y    [x ] N    3.  PCP contacted at Discharge: [ ] Y    [ ] N    [x ] N/A  4.  Post-Discharge Appointment Date and Location:  to be clarified upon DC  5.  Summary of Handoff given to PCP:  to be clarified upon DC. Transitions of Care Status:  1.  Name of PCP:   Ezio Lutz MD  2.  PCP Contacted on Admission: [x] Y    [] N  -contacted on day 3 of admission  3.  PCP contacted at Discharge: [ ] Y    [ ] N    [x ] N/A  4.  Post-Discharge Appointment Date and Location:    5.  Summary of Handoff given to PCP:

## 2019-11-20 NOTE — DIETITIAN INITIAL EVALUATION ADULT. - PHYSICAL APPEARANCE
well nourished/other (specify)/Nutrition focused physical exam not conducted t this time. No visual signs of muscle or fat loss. Ht: inches Wt: pounds BMI: kg/m2 IBW: (+/-10%) %IBW  Edema Skin Ht: 64 inches (per pt) Wt: 149.6 pounds BMI: 25.6 kg/m2 IBW: 120 pounds (+/-10%) %IBW: 125%  Edema: none noted per documentation Skin: intact per flow sheets

## 2019-11-20 NOTE — DIETITIAN INITIAL EVALUATION ADULT. - MALNUTRITION
Mild malnutrition in the context of social or environmental circumstances Moderate malnutrition in the context of social or environmental circumstances

## 2019-11-20 NOTE — PROGRESS NOTE ADULT - SUBJECTIVE AND OBJECTIVE BOX
Patient is a 74y old  Female who presents with a chief complaint of Tremulousness from today following alcohol binge, chest pressure. (2019 12:07)        SUBJECTIVE / OVERNIGHT EVENTS: states she is upset and so her blood pressure is high. states shakiness from anxiety      MEDICATIONS  (STANDING):  diltiazem    milliGRAM(s) Oral daily  folic acid 1 milliGRAM(s) Oral daily  influenza   Vaccine 0.5 milliLiter(s) IntraMuscular once  LORazepam     Tablet   Oral   LORazepam     Tablet 1.5 milliGRAM(s) Oral every 4 hours  LORazepam     Tablet 1 milliGRAM(s) Oral every 4 hours  metoprolol succinate ER 50 milliGRAM(s) Oral daily  multivitamin 1 Tablet(s) Oral daily  potassium chloride    Tablet ER 20 milliEquivalent(s) Oral once  potassium phosphate IVPB 15 milliMole(s) IV Intermittent once  rivaroxaban 20 milliGRAM(s) Oral with dinner  thiamine 100 milliGRAM(s) Oral daily  venlafaxine XR. 150 milliGRAM(s) Oral daily    MEDICATIONS  (PRN):  LORazepam     Tablet 2 milliGRAM(s) Oral every 2 hours PRN Symptom-triggered: 2 point increase in CIWA -Ar score and a total score of 7 or LESS  LORazepam   Injectable 2 milliGRAM(s) IV Push every 1 hour PRN Symptom-triggered: each CIWA -Ar score 8 or GREATER      Vital Signs Last 24 Hrs  T(C): 36.4 (2019 08:45), Max: 36.9 (2019 14:55)  T(F): 97.5 (2019 08:45), Max: 98.4 (2019 14:55)  HR: 98 (2019 10:42) (85 - 110)  BP: 190/79 (2019 10:42) (116/82 - 190/79)  BP(mean): --  RR: 18 (2019 10:42) (18 - 18)  SpO2: 94% (2019 10:42) (94% - 100%)  CAPILLARY BLOOD GLUCOSE        I&O's Summary    2019 07:01  -  2019 07:00  --------------------------------------------------------  IN: 240 mL / OUT: 0 mL / NET: 240 mL    2019 07:01  -  2019 12:35  --------------------------------------------------------  IN: 120 mL / OUT: 0 mL / NET: 120 mL        PHYSICAL EXAM  GENERAL: moderate tremor, well developed  HEAD:  Atraumatic, Normocephalic  EYES: EOMI, PERRLA, conjunctiva and sclera clear  CHEST/LUNG: Clear to auscultation bilaterally; No wheeze  HEART: Irregular, +S1+S2  ABDOMEN: Soft, Nontender, Nondistended; Bowel sounds present  EXTREMITIES:  2+ Peripheral Pulses, No clubbing, cyanosis, or edema  PSYCH: AAOx3  neuro: no facial droop, no focal weakness    LABS:                        12.2   4.51  )-----------( 148      ( 2019 08:31 )             36.5     -    141  |  102  |  8   ----------------------------<  93  3.4<L>   |  24  |  0.44<L>    Ca    9.1      2019 06:42  Phos  3.0     -  Mg     1.7         TPro  7.0  /  Alb  4.2  /  TBili  0.6  /  DBili  0.1  /  AST  58<H>  /  ALT  50<H>  /  AlkPhos  63  -19    PT/INR - ( 2019 01:27 )   PT: 12.0 sec;   INR: 1.04 ratio         PTT - ( 2019 01:27 )  PTT:21.9 sec  CARDIAC MARKERS ( 2019 17:55 )  x     / x     / 112 U/L / x     / x          Urinalysis Basic - ( 2019 23:39 )    Color: Yellow / Appearance: Clear / S.016 / pH: x  Gluc: x / Ketone: Small  / Bili: Negative / Urobili: Negative   Blood: x / Protein: 30 mg/dL / Nitrite: Negative   Leuk Esterase: Moderate / RBC: 5 /hpf / WBC 12 /HPF   Sq Epi: x / Non Sq Epi: 7 /hpf / Bacteria: Negative        RADIOLOGY & ADDITIONAL TESTS:    Imaging Personally Reviewed:  Consultant(s) Notes Reviewed:    Care Discussed with Consultants/Other Providers:

## 2019-11-20 NOTE — DIETITIAN INITIAL EVALUATION ADULT. - ADD RECOMMEND
1. Continue to monitor weights, labs, skin, diet preferences, and further educational needs. 2. Monitor and replete electrolytes as needed. 3. Provide encouragement and reinforcement of healthy eating habits. 4. Pt educated on general healthy eating and low sodium nutrition therapy.

## 2019-11-21 ENCOUNTER — TRANSCRIPTION ENCOUNTER (OUTPATIENT)
Age: 74
End: 2019-11-21

## 2019-11-21 VITALS
TEMPERATURE: 98 F | SYSTOLIC BLOOD PRESSURE: 132 MMHG | OXYGEN SATURATION: 98 % | DIASTOLIC BLOOD PRESSURE: 88 MMHG | HEART RATE: 97 BPM | RESPIRATION RATE: 18 BRPM

## 2019-11-21 LAB
ANION GAP SERPL CALC-SCNC: 14 MMOL/L — SIGNIFICANT CHANGE UP (ref 5–17)
BUN SERPL-MCNC: 5 MG/DL — LOW (ref 7–23)
CALCIUM SERPL-MCNC: 9.2 MG/DL — SIGNIFICANT CHANGE UP (ref 8.4–10.5)
CHLORIDE SERPL-SCNC: 103 MMOL/L — SIGNIFICANT CHANGE UP (ref 96–108)
CO2 SERPL-SCNC: 24 MMOL/L — SIGNIFICANT CHANGE UP (ref 22–31)
CREAT SERPL-MCNC: 0.43 MG/DL — LOW (ref 0.5–1.3)
GLUCOSE SERPL-MCNC: 92 MG/DL — SIGNIFICANT CHANGE UP (ref 70–99)
HCT VFR BLD CALC: 35.4 % — SIGNIFICANT CHANGE UP (ref 34.5–45)
HGB BLD-MCNC: 11.6 G/DL — SIGNIFICANT CHANGE UP (ref 11.5–15.5)
MAGNESIUM SERPL-MCNC: 1.5 MG/DL — LOW (ref 1.6–2.6)
MCHC RBC-ENTMCNC: 32.5 PG — SIGNIFICANT CHANGE UP (ref 27–34)
MCHC RBC-ENTMCNC: 32.8 GM/DL — SIGNIFICANT CHANGE UP (ref 32–36)
MCV RBC AUTO: 99.2 FL — SIGNIFICANT CHANGE UP (ref 80–100)
PHOSPHATE SERPL-MCNC: 3.6 MG/DL — SIGNIFICANT CHANGE UP (ref 2.5–4.5)
PLATELET # BLD AUTO: 144 K/UL — LOW (ref 150–400)
POTASSIUM SERPL-MCNC: 3.9 MMOL/L — SIGNIFICANT CHANGE UP (ref 3.5–5.3)
POTASSIUM SERPL-SCNC: 3.9 MMOL/L — SIGNIFICANT CHANGE UP (ref 3.5–5.3)
RBC # BLD: 3.57 M/UL — LOW (ref 3.8–5.2)
RBC # FLD: 13.1 % — SIGNIFICANT CHANGE UP (ref 10.3–14.5)
SODIUM SERPL-SCNC: 141 MMOL/L — SIGNIFICANT CHANGE UP (ref 135–145)
WBC # BLD: 4.08 K/UL — SIGNIFICANT CHANGE UP (ref 3.8–10.5)
WBC # FLD AUTO: 4.08 K/UL — SIGNIFICANT CHANGE UP (ref 3.8–10.5)

## 2019-11-21 PROCEDURE — 84484 ASSAY OF TROPONIN QUANT: CPT

## 2019-11-21 PROCEDURE — 80162 ASSAY OF DIGOXIN TOTAL: CPT

## 2019-11-21 PROCEDURE — 85027 COMPLETE CBC AUTOMATED: CPT

## 2019-11-21 PROCEDURE — 82550 ASSAY OF CK (CPK): CPT

## 2019-11-21 PROCEDURE — 80048 BASIC METABOLIC PNL TOTAL CA: CPT

## 2019-11-21 PROCEDURE — 80076 HEPATIC FUNCTION PANEL: CPT

## 2019-11-21 PROCEDURE — 70450 CT HEAD/BRAIN W/O DYE: CPT

## 2019-11-21 PROCEDURE — 99239 HOSP IP/OBS DSCHRG MGMT >30: CPT

## 2019-11-21 PROCEDURE — 71045 X-RAY EXAM CHEST 1 VIEW: CPT

## 2019-11-21 PROCEDURE — 85730 THROMBOPLASTIN TIME PARTIAL: CPT

## 2019-11-21 PROCEDURE — 96374 THER/PROPH/DIAG INJ IV PUSH: CPT

## 2019-11-21 PROCEDURE — 86803 HEPATITIS C AB TEST: CPT

## 2019-11-21 PROCEDURE — 97162 PT EVAL MOD COMPLEX 30 MIN: CPT

## 2019-11-21 PROCEDURE — 84443 ASSAY THYROID STIM HORMONE: CPT

## 2019-11-21 PROCEDURE — 72125 CT NECK SPINE W/O DYE: CPT

## 2019-11-21 PROCEDURE — 93005 ELECTROCARDIOGRAM TRACING: CPT

## 2019-11-21 PROCEDURE — 85610 PROTHROMBIN TIME: CPT

## 2019-11-21 PROCEDURE — 93306 TTE W/DOPPLER COMPLETE: CPT

## 2019-11-21 PROCEDURE — 96361 HYDRATE IV INFUSION ADD-ON: CPT

## 2019-11-21 PROCEDURE — 83735 ASSAY OF MAGNESIUM: CPT

## 2019-11-21 PROCEDURE — 99285 EMERGENCY DEPT VISIT HI MDM: CPT | Mod: 25

## 2019-11-21 PROCEDURE — 87086 URINE CULTURE/COLONY COUNT: CPT

## 2019-11-21 PROCEDURE — 81001 URINALYSIS AUTO W/SCOPE: CPT

## 2019-11-21 PROCEDURE — 82962 GLUCOSE BLOOD TEST: CPT

## 2019-11-21 PROCEDURE — 80053 COMPREHEN METABOLIC PANEL: CPT

## 2019-11-21 PROCEDURE — 84100 ASSAY OF PHOSPHORUS: CPT

## 2019-11-21 PROCEDURE — 96375 TX/PRO/DX INJ NEW DRUG ADDON: CPT

## 2019-11-21 RX ORDER — METOPROLOL TARTRATE 50 MG
1 TABLET ORAL
Qty: 0 | Refills: 0 | DISCHARGE
Start: 2019-11-21

## 2019-11-21 RX ORDER — VENLAFAXINE HCL 75 MG
1 CAPSULE, EXT RELEASE 24 HR ORAL
Qty: 0 | Refills: 0 | DISCHARGE
Start: 2019-11-21

## 2019-11-21 RX ORDER — PANTOPRAZOLE SODIUM 20 MG/1
1 TABLET, DELAYED RELEASE ORAL
Qty: 0 | Refills: 0 | DISCHARGE

## 2019-11-21 RX ORDER — VENLAFAXINE HCL 75 MG
1 CAPSULE, EXT RELEASE 24 HR ORAL
Qty: 0 | Refills: 0 | DISCHARGE

## 2019-11-21 RX ORDER — METOPROLOL TARTRATE 50 MG
1 TABLET ORAL
Qty: 0 | Refills: 0 | DISCHARGE

## 2019-11-21 RX ORDER — DILTIAZEM HCL 120 MG
1 CAPSULE, EXT RELEASE 24 HR ORAL
Qty: 0 | Refills: 0 | DISCHARGE
Start: 2019-11-21

## 2019-11-21 RX ORDER — CEFOXITIN 1 G/1
1 INJECTION, POWDER, FOR SOLUTION INTRAVENOUS
Qty: 0 | Refills: 0 | DISCHARGE

## 2019-11-21 RX ORDER — MAGNESIUM SULFATE 500 MG/ML
1 VIAL (ML) INJECTION ONCE
Refills: 0 | Status: COMPLETED | OUTPATIENT
Start: 2019-11-21 | End: 2019-11-21

## 2019-11-21 RX ADMIN — Medication 1 MILLIGRAM(S): at 06:11

## 2019-11-21 RX ADMIN — Medication 1 MILLIGRAM(S): at 10:25

## 2019-11-21 RX ADMIN — Medication 50 MILLIGRAM(S): at 06:11

## 2019-11-21 RX ADMIN — Medication 1 MILLIGRAM(S): at 13:24

## 2019-11-21 RX ADMIN — Medication 120 MILLIGRAM(S): at 06:11

## 2019-11-21 RX ADMIN — Medication 150 MILLIGRAM(S): at 13:24

## 2019-11-21 RX ADMIN — Medication 100 GRAM(S): at 13:24

## 2019-11-21 RX ADMIN — Medication 1 MILLIGRAM(S): at 01:41

## 2019-11-21 RX ADMIN — Medication 1 TABLET(S): at 13:24

## 2019-11-21 RX ADMIN — Medication 100 MILLIGRAM(S): at 13:24

## 2019-11-21 NOTE — DISCHARGE NOTE NURSING/CASE MANAGEMENT/SOCIAL WORK - NSDCFUADDAPPT_GEN_ALL_CORE_FT
Please follow up with [facility], Inpatient Substance Misuse Treatment Program ([phone]) for bed availability.     Refer to other resources provided for Crisis Centers and Inpatient/ Outpatient Treatment Programs related to Substance Misuse.

## 2019-11-21 NOTE — DISCHARGE NOTE NURSING/CASE MANAGEMENT/SOCIAL WORK - NSDCPEXARELTO_GEN_ALL_CORE
Rivaroxaban/Xarelto - Follow up monitoring/Rivaroxaban/Xarelto - Dietary Advice/Rivaroxaban/Xarelto - Potential for adverse drug reactions and interactions/Rivaroxaban/Xarelto - Compliance

## 2019-11-21 NOTE — DISCHARGE NOTE PROVIDER - HOSPITAL COURSE
74F, hx afib on Xarelto, lung ca, depression, arthritis, chronic alcohol abuse presents with  chief complaint of alcohol abuse. patient reports extensive hx of alcohol abuse over the last 50+ years. Reports hx of withdrawal but denies hx seizures. Has been to rehab multiple times w/o success. Reports that her last drink was 4 days ago.         Problem/Plan - 1:    ·  Problem: Alcohol withdrawal syndrome without complication.  Plan: - CIWA protocol with Ativan taper with rescue.     - Social work facilitating inpt vs outpt rehab.         Problem/Plan - 2:    ·  Problem: Longstanding persistent atrial fibrillation.  Plan: - continue with  Xarelto 20mg qd.          Problem/Plan - 3:    ·  Problem: Essential hypertension.  Plan: c/w Cardizem and Toprol    monitor bp.          Problem/Plan - 4:    ·  Problem: Pontine lesion.  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 74F, hx afib on Xarelto, lung ca, depression, arthritis, chronic alcohol abuse presents with  chief complaint of alcohol abuse. patient reports extensive hx of alcohol abuse over the last 50+ years. Reports hx of withdrawal but denies hx seizures. Has been to rehab multiple times w/o success. Reports that her last drink was 4 days ago.         Alcohol withdrawal syndrome without complication.  - CIWA protocol started, pt now scoring zero. CIWA discontinued.      - Social work facilitating outpt rehab.        Longstanding persistent atrial fibrillation.   - continue with  Xarelto 20mg qd.         Essential hypertension.  c/w Cardizem and Toprol.          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 patient reports extensive hx of alcohol abuse over the last 50+ years. Reports hx of withdrawal but denies hx seizures. Has been to rehab multiple times w/o success. Reports that her last drink was 4 days ago.         Alcohol withdrawal syndrome without complication.  - CIWA protocol started, pt now scoring zero. CIWA discontinued.      - Social work facilitating outpt rehab.        Longstanding persistent atrial fibrillation.   - continue with  Xarelto 20mg qd.         Essential hypertension.  c/w Cardizem and Toprol.          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

## 2019-11-21 NOTE — PROGRESS NOTE ADULT - SUBJECTIVE AND OBJECTIVE BOX
Patient is a 74y old  Female who presents with a chief complaint of Tremulousness from today following alcohol binge, chest pressure. (20 Nov 2019 12:34)      SUBJECTIVE / OVERNIGHT EVENTS:    MEDICATIONS  (STANDING):  diltiazem    milliGRAM(s) Oral daily  folic acid 1 milliGRAM(s) Oral daily  influenza   Vaccine 0.5 milliLiter(s) IntraMuscular once  LORazepam     Tablet   Oral   LORazepam     Tablet 0.5 milliGRAM(s) Oral every 4 hours  LORazepam     Tablet 1 milliGRAM(s) Oral every 4 hours  metoprolol succinate ER 50 milliGRAM(s) Oral daily  multivitamin 1 Tablet(s) Oral daily  potassium phosphate IVPB 15 milliMole(s) IV Intermittent once  rivaroxaban 20 milliGRAM(s) Oral with dinner  thiamine 100 milliGRAM(s) Oral daily  venlafaxine XR. 150 milliGRAM(s) Oral daily    MEDICATIONS  (PRN):  LORazepam     Tablet 2 milliGRAM(s) Oral every 2 hours PRN Symptom-triggered: 2 point increase in CIWA -Ar score and a total score of 7 or LESS  LORazepam   Injectable 2 milliGRAM(s) IV Push every 1 hour PRN Symptom-triggered: each CIWA -Ar score 8 or GREATER      Vital Signs Last 24 Hrs  T(C): 36.6 (21 Nov 2019 06:00), Max: 36.7 (20 Nov 2019 18:51)  T(F): 97.8 (21 Nov 2019 06:00), Max: 98 (20 Nov 2019 18:51)  HR: 95 (21 Nov 2019 06:00) (87 - 112)  BP: 148/85 (21 Nov 2019 06:00) (131/88 - 190/79)  BP(mean): --  RR: 18 (21 Nov 2019 06:00) (18 - 20)  SpO2: 95% (21 Nov 2019 06:00) (94% - 98%)  CAPILLARY BLOOD GLUCOSE        I&O's Summary    20 Nov 2019 07:01  -  21 Nov 2019 07:00  --------------------------------------------------------  IN: 675 mL / OUT: 1 mL / NET: 674 mL        PHYSICAL EXAM:  GENERAL: NAD, well-developed  HEAD:  Atraumatic, Normocephalic  EYES: EOMI, PERRLA, conjunctiva and sclera clear  NECK: Supple, No JVD  CHEST/LUNG: Clear to auscultation bilaterally; No wheeze  HEART: Regular rate and rhythm; No murmurs, rubs, or gallops  ABDOMEN: Soft, Nontender, Nondistended; Bowel sounds present  EXTREMITIES:  2+ Peripheral Pulses, No clubbing, cyanosis, or edema  PSYCH: AAOx3  NEUROLOGY: non-focal  SKIN: No rashes or lesions    LABS:                        12.2   4.51  )-----------( 148      ( 20 Nov 2019 08:31 )             36.5     11-21    141  |  103  |  5<L>  ----------------------------<  92  3.9   |  24  |  0.43<L>    Ca    9.2      21 Nov 2019 06:45  Phos  3.6     11-21  Mg     1.5     11-21                RADIOLOGY & ADDITIONAL TESTS:    Imaging Personally Reviewed:    Consultant(s) Notes Reviewed:      Care Discussed with Consultants/Other Providers: Patient is a 74y old  Female who presents with a chief complaint of Tremulousness from today following alcohol binge, chest pressure. (20 Nov 2019 12:34)      SUBJECTIVE / OVERNIGHT EVENTS:  Pt seen and examined with daughters at bedside. No acute events overnight. She denies any new c/o. Eager to go home however her dtrs are concerned that she will not f/up with out pt rehab on discharge. They report that pt is agoraphobic an dhad a suicide attempt a few years ago.      MEDICATIONS  (STANDING):  diltiazem    milliGRAM(s) Oral daily  folic acid 1 milliGRAM(s) Oral daily  influenza   Vaccine 0.5 milliLiter(s) IntraMuscular once  LORazepam     Tablet   Oral   LORazepam     Tablet 0.5 milliGRAM(s) Oral every 4 hours  LORazepam     Tablet 1 milliGRAM(s) Oral every 4 hours  metoprolol succinate ER 50 milliGRAM(s) Oral daily  multivitamin 1 Tablet(s) Oral daily  potassium phosphate IVPB 15 milliMole(s) IV Intermittent once  rivaroxaban 20 milliGRAM(s) Oral with dinner  thiamine 100 milliGRAM(s) Oral daily  venlafaxine XR. 150 milliGRAM(s) Oral daily    MEDICATIONS  (PRN):  LORazepam     Tablet 2 milliGRAM(s) Oral every 2 hours PRN Symptom-triggered: 2 point increase in CIWA -Ar score and a total score of 7 or LESS  LORazepam   Injectable 2 milliGRAM(s) IV Push every 1 hour PRN Symptom-triggered: each CIWA -Ar score 8 or GREATER      Vital Signs Last 24 Hrs  T(C): 36.6 (21 Nov 2019 06:00), Max: 36.7 (20 Nov 2019 18:51)  T(F): 97.8 (21 Nov 2019 06:00), Max: 98 (20 Nov 2019 18:51)  HR: 95 (21 Nov 2019 06:00) (87 - 112)  BP: 148/85 (21 Nov 2019 06:00) (131/88 - 190/79)  BP(mean): --  RR: 18 (21 Nov 2019 06:00) (18 - 20)  SpO2: 95% (21 Nov 2019 06:00) (94% - 98%)  CAPILLARY BLOOD GLUCOSE        I&O's Summary    20 Nov 2019 07:01  -  21 Nov 2019 07:00  --------------------------------------------------------  IN: 675 mL / OUT: 1 mL / NET: 674 mL        PHYSICAL EXAM:  GENERAL: NAD, anicteric, afebrile  HEAD:  Atraumatic, Normocephalic  EYES: EOMI, PERRLA, conjunctiva and sclera clear  NECK: Supple, No JVD  CHEST/LUNG: Clear to auscultation bilaterally; No wheeze  HEART: Regular rate and rhythm; No murmurs, rubs, or gallops  ABDOMEN: Soft, Nontender, Nondistended; Bowel sounds present  EXTREMITIES:   2+ Peripheral Pulses, No clubbing, cyanosis, or edema  PSYCH: AAOx 3  NEUROLOGY: non-focal ; mildly tremulous  SKIN: No rashes or lesions    LABS:                        12.2   4.51  )-----------( 148      ( 20 Nov 2019 08:31 )             36.5     11-21    141  |  103  |  5<L>  ----------------------------<  92  3.9   |  24  |  0.43<L>    Ca    9.2      21 Nov 2019 06:45  Phos  3.6     11-21  Mg     1.5     11-21                RADIOLOGY & ADDITIONAL TESTS:    Imaging Personally Reviewed:    CT BRAIN  No mass effect or hemorrhage. Focal calcification within the   citlalli on the right without associated mass effect. This may represent a   cavernoma although other possibilities are not excluded. Nonemergent MR   of the brain is recommended as clinically warranted for further   evaluation. Patient is a 74y old  Female who presents with a chief complaint of Tremulousness from today following alcohol binge, chest pressure. (20 Nov 2019 12:34)      SUBJECTIVE / OVERNIGHT EVENTS:  Pt seen and examined with daughters at bedside. No acute events overnight. She denies any new c/o. Eager to go home however her dtrs are concerned that she will not f/up with out pt rehab on discharge.       MEDICATIONS  (STANDING):  diltiazem    milliGRAM(s) Oral daily  folic acid 1 milliGRAM(s) Oral daily  influenza   Vaccine 0.5 milliLiter(s) IntraMuscular once  LORazepam     Tablet   Oral   LORazepam     Tablet 0.5 milliGRAM(s) Oral every 4 hours  LORazepam     Tablet 1 milliGRAM(s) Oral every 4 hours  metoprolol succinate ER 50 milliGRAM(s) Oral daily  multivitamin 1 Tablet(s) Oral daily  potassium phosphate IVPB 15 milliMole(s) IV Intermittent once  rivaroxaban 20 milliGRAM(s) Oral with dinner  thiamine 100 milliGRAM(s) Oral daily  venlafaxine XR. 150 milliGRAM(s) Oral daily    MEDICATIONS  (PRN):  LORazepam     Tablet 2 milliGRAM(s) Oral every 2 hours PRN Symptom-triggered: 2 point increase in CIWA -Ar score and a total score of 7 or LESS  LORazepam   Injectable 2 milliGRAM(s) IV Push every 1 hour PRN Symptom-triggered: each CIWA -Ar score 8 or GREATER      Vital Signs Last 24 Hrs  T(C): 36.6 (21 Nov 2019 06:00), Max: 36.7 (20 Nov 2019 18:51)  T(F): 97.8 (21 Nov 2019 06:00), Max: 98 (20 Nov 2019 18:51)  HR: 95 (21 Nov 2019 06:00) (87 - 112)  BP: 148/85 (21 Nov 2019 06:00) (131/88 - 190/79)  BP(mean): --  RR: 18 (21 Nov 2019 06:00) (18 - 20)  SpO2: 95% (21 Nov 2019 06:00) (94% - 98%)  CAPILLARY BLOOD GLUCOSE        I&O's Summary    20 Nov 2019 07:01  -  21 Nov 2019 07:00  --------------------------------------------------------  IN: 675 mL / OUT: 1 mL / NET: 674 mL        PHYSICAL EXAM:  GENERAL: NAD, anicteric, afebrile  HEAD:  Atraumatic, Normocephalic  EYES: EOMI, PERRLA, conjunctiva and sclera clear  NECK: Supple, No JVD  CHEST/LUNG: Clear to auscultation bilaterally; No wheeze  HEART: Regular rate and rhythm; No murmurs, rubs, or gallops  ABDOMEN: Soft, Nontender, Nondistended; Bowel sounds present  EXTREMITIES:   2+ Peripheral Pulses, No clubbing, cyanosis, or edema  PSYCH: AAOx 3  NEUROLOGY: non-focal ; mildly tremulous  SKIN: No rashes or lesions    LABS:                        12.2   4.51  )-----------( 148      ( 20 Nov 2019 08:31 )             36.5     11-21    141  |  103  |  5<L>  ----------------------------<  92  3.9   |  24  |  0.43<L>    Ca    9.2      21 Nov 2019 06:45  Phos  3.6     11-21  Mg     1.5     11-21                RADIOLOGY & ADDITIONAL TESTS:    Imaging Personally Reviewed:    CT BRAIN  No mass effect or hemorrhage. Focal calcification within the   citlalli on the right without associated mass effect. This may represent a   cavernoma although other possibilities are not excluded. Nonemergent MR   of the brain is recommended as clinically warranted for further   evaluation.

## 2019-11-21 NOTE — DISCHARGE NOTE PROVIDER - NSDCCPCAREPLAN_GEN_ALL_CORE_FT
PRINCIPAL DISCHARGE DIAGNOSIS  Diagnosis: Alcohol withdrawal syndrome without complication  Assessment and Plan of Treatment: Follow up with resources provided by       SECONDARY DISCHARGE DIAGNOSES  Diagnosis: Pontine lesion  Assessment and Plan of Treatment: Follow up with your doctor for MRI of your head    Diagnosis: Depression, unspecified depression type  Assessment and Plan of Treatment: Supportive care PRINCIPAL DISCHARGE DIAGNOSIS  Diagnosis: Alcohol withdrawal syndrome without complication  Assessment and Plan of Treatment: Follow up with resources provided by       SECONDARY DISCHARGE DIAGNOSES  Diagnosis: Pontine lesion  Assessment and Plan of Treatment: Seen on CT scan. Follow up with your doctor for MRI of your head    Diagnosis: Atrial fibrillation  Assessment and Plan of Treatment: Continue with  Xarelto    Diagnosis: Depression, unspecified depression type  Assessment and Plan of Treatment: Supportive care

## 2019-11-21 NOTE — PROGRESS NOTE ADULT - PROBLEM SELECTOR PLAN 5
Resuming outpatient medications Cardizem and Toprol
Resuming outpatient medications Cardizem and Toprol  monitor bp
Transitions of Care Status:  1.  Name of PCP:   Ezio Lutz MD  2.  PCP Contacted on Admission: [x] Y    [] N  -contacted on day 3 of admission  3.  PCP contacted at Discharge: [ ] Y    [ ] N    [x ] N/A  4.  Post-Discharge Appointment Date and Location:    5.  Summary of Handoff given to PCP:

## 2019-11-21 NOTE — DISCHARGE NOTE PROVIDER - NSDCFUADDINST_GEN_ALL_CORE_FT
Follow up with your Primary care doctor within one week.   You will need an MRI of your head  Follow up inpatient rehab provided by  Follow up with your Primary care doctor within one week.   You will need an MRI of your head  Follow up with [facility]-([phone]) inpatient substance treatment  rehab

## 2019-11-21 NOTE — PROGRESS NOTE ADULT - PROBLEM SELECTOR PROBLEM 2
Depression, unspecified depression type
Depression, unspecified depression type
Longstanding persistent atrial fibrillation

## 2019-11-21 NOTE — PHYSICAL THERAPY INITIAL EVALUATION ADULT - PERTINENT HX OF CURRENT PROBLEM, REHAB EVAL
Pt is a 75 y/o female hx AF on Xarelto, lung CA s/p MONTY resection, HTN, ETOH abuse p/w ETOH withdrawal.

## 2019-11-21 NOTE — PROGRESS NOTE ADULT - ASSESSMENT
73 yo female hx AF on Xarelto, lung CA s/p MONTY resection, HTN, ETOH abuse p/w ETOH withdrawal
75 yo female hx AF on Xarelto, lung CA s/p MONTY resection, HTN, ETOH abuse p/w ETOH withdrawal
74F, hx afib on Xarelto, lung ca, depression, arthritis, chronic alcohol abuse presents with  chief complaint of alcohol abuse. patient reports extensive hx of alcohol abuse over the last 50+ years. Reports hx of withdrawal but denies hx seizures. Has been to rehab multiple times w/o success. Reports that her last drink was 4 days ago.

## 2019-11-21 NOTE — PHYSICAL THERAPY INITIAL EVALUATION ADULT - PLANNED THERAPY INTERVENTIONS, PT EVAL
balance training/Stair Negotiation Training: GOAL- Patient will be able to negotiate up & down 1 flight of stairs independently with single railing, step to gait pattern, in 1-2 sessions

## 2019-11-21 NOTE — PROGRESS NOTE ADULT - PROBLEM SELECTOR PLAN 3
- c/w Metoprolol c/w Cardizem and Toprol  monitor bp - f/up pharmacy re : med rec for Cardizem , Digoxin  - c/w Ramipril, Toprol  monitor bp

## 2019-11-21 NOTE — PROGRESS NOTE ADULT - PROBLEM SELECTOR PLAN 1
- CIWA protocol with Ativan taper with rescue.   - Social work facilitating inpt vs outpt rehab  - Patient now in agreement with Psych eval ; will need assessment for decisional capacity as well      Will defer to the DAY PROVIDER review of the significance of the pontine findings--and will defer need for non urgent MRI brain. - CIWA protocol with Ativan taper with rescue.   - Social work facilitating inpt vs outpt rehab        Will defer to the DAY PROVIDER review of the significance of the pontine findings--and will defer need for non urgent MRI brain. - CIWA protocol with Ativan taper with rescue.   - Social work facilitating inpt vs outpt rehab

## 2019-11-21 NOTE — CHART NOTE - NSCHARTNOTEFT_GEN_A_CORE
Nutrition Follow Up Note  Patient seen for: Moderate Malnutrition follow up  Pt with Hx of EtOH abuse    Source: Pt and medical record    Diet: DASH/TLC     Patient reports good PO intake and appetite. Reports she consumed >75% breakfast today (11/21). Pt reports no GI distress. States last BM today (11/21).     Enteral /Parenteral Nutrition: N/A    Weights: (11/20) 152.7 pounds (11/18) 149.6 pounds   Weight stable.    Pertinent Medications: Ativan, Cardizem CD, folic acid, Toprol XL, multivitamin, potassium phosphate, thiamine  Pertinent Labs: (11/21) BUN 5 <L>, Creatinine 0.43 <L>, Magnesium 1.5 <L> (11/19) ALT/SGOT 58 <H>, ALT/SGPT 50 <H>    Skin per nursing documentation: intact  Edema: none per flow sheets    Estimated Needs:   [ x] no change since previous assessment  [ ] recalculated:     Previous Nutrition Diagnosis: Moderate malnutrition in the context of social or environmental circumstances  Nutrition Diagnosis is: ongoing; being addressed with encouragement of good PO intake; reinforcement of good eating habits    New Nutrition Diagnosis: N/A    Recommend  1) Continue current diet- DASH/TLC.  2) Reinforce good eating habits and low sodium nutrition therapy education.  3) Monitor and replete electrolytes as needed.    Monitoring and Evaluation:     Continue to monitor Nutritional intake, Tolerance to diet prescription, weights, labs, skin integrity, and further educational needs.    RD remains available upon request and will follow up per protocol.  Natalia Garcia, Dietetic Intern (Pager #[phone]) Nutrition Follow Up Note  Patient seen for: Moderate Malnutrition follow up  Pt with [facility] A-fib, S/P cardioversion (2017), dyslipidemia, HTN, anxiety, depression, alcohol abuse, lung CA S/P upper left lobectomy (now disease free).   Admitted for EtOH abuse.    Source: Pt and medical record    Diet: DASH/TLC     Patient reports good PO intake and appetite. Reports she consumed >75% breakfast today (11/21). Pt reports no GI distress. States last BM today (11/21).     Enteral /Parenteral Nutrition: N/A    Weights: (11/20) 152.7 pounds (11/18) 149.6 pounds   Weight stable.    Pertinent Medications: Ativan, Cardizem CD, folic acid, Toprol XL, multivitamin, potassium phosphate, thiamine  Pertinent Labs: (11/21) BUN 5 <L>, Creatinine 0.43 <L>, Magnesium 1.5 <L> (11/19) ALT/SGOT 58 <H>, ALT/SGPT 50 <H>    Skin per nursing documentation: intact  Edema: none per flow sheets    Estimated Needs:   [ x] no change since previous assessment  [ ] recalculated:     Previous Nutrition Diagnosis: Moderate malnutrition in the context of social or environmental circumstances  Nutrition Diagnosis is: ongoing; being addressed with encouragement of good PO intake; reinforcement of good eating habits    New Nutrition Diagnosis: N/A    Recommend  1) Continue current diet- DASH/TLC.  2) Reinforce good eating habits and low sodium nutrition therapy education.  3) Monitor and replete electrolytes as needed.    Monitoring and Evaluation:     Continue to monitor Nutritional intake, Tolerance to diet prescription, weights, labs, skin integrity, and further educational needs.    RD remains available upon request and will follow up per protocol.  Natalia Garcia, Dietetic Intern (Pager #[phone])

## 2019-11-21 NOTE — DISCHARGE NOTE NURSING/CASE MANAGEMENT/SOCIAL WORK - PATIENT PORTAL LINK FT
You can access the FollowMyHealth Patient Portal offered by [facility] by registering at the following website: [email or web address]. By joining [facility]’s FollowMyHealth portal, you will also be able to view your health information using other applications (apps) compatible with our system.

## 2019-11-21 NOTE — DISCHARGE NOTE PROVIDER - CARE PROVIDER_API CALL
Ezio Lutz)  Cardiology; Internal Medicine  [address] [place] [address], [address]  [place] [address]  Phone: [phone]  Fax: [phone]  Follow Up Time:

## 2019-11-21 NOTE — DISCHARGE NOTE PROVIDER - CONDITIONS AT DISCHARGE
Home with outpatient substance misuse treatment referral Home with substance misuse treatment referral

## 2019-11-21 NOTE — PROGRESS NOTE ADULT - PROBLEM SELECTOR PLAN 4
Transitions of Care Status:  1.  Name of PCP:   Ezio Lutz MD  2.  PCP Contacted on Admission: [ ] Y    [x ] N    3.  PCP contacted at Discharge: [ ] Y    [ ] N    [x ] N/A  4.  Post-Discharge Appointment Date and Location:  to be clarified upon DC  5.  Summary of Handoff given to PCP:  to be clarified upon DC. Julio calcification seen on CT head   MR of brain ordered to evaluate cavernoma Julio calcification?cavernoma seen on CT head  MR of brain ordered to evaluate as pt is unlikely to obtain MRI outpt Julio calcification?cavernoma seen on CT head  Non emergent MR brain can be done as outpt

## 2019-11-21 NOTE — DISCHARGE NOTE PROVIDER - NSDCMRMEDTOKEN_GEN_ALL_CORE_FT
Cardizem  mg/24 hours oral capsule, extended release: 1 cap(s) orally once a day   Ceftin 500 mg oral tablet: 1 tab(s) orally every 12 hours  digoxin 125 mcg (0.125 mg) oral tablet: 1 tab(s) orally once a day  metoprolol succinate 100 mg oral tablet, extended release: 1 tab(s) orally once a day  pantoprazole 40 mg oral delayed release tablet: 1 tab(s) orally once a day  Percocet 5/325 oral tablet: 1 tab(s) orally every 6 hours, As Needed  ramipril 10 mg oral capsule: 1 cap(s) orally once a day  venlafaxine 150 mg oral capsule, extended release: 1 cap(s) orally once a day  Xarelto 20 mg oral tablet: 1 tab(s) orally once a day (in the evening) Cardizem  mg/24 hours oral capsule, extended release: 1 cap(s) orally once a day   Ceftin 500 mg oral tablet: 1 tab(s) orally every 12 hours  digoxin 125 mcg (0.125 mg) oral tablet: 1 tab(s) orally once a day  metoprolol succinate 100 mg oral tablet, extended release: 1 tab(s) orally once a day  pantoprazole 40 mg oral delayed release tablet: 1 tab(s) orally once a day  ramipril 10 mg oral capsule: 1 cap(s) orally once a day  venlafaxine 150 mg oral capsule, extended release: 1 cap(s) orally once a day  Xarelto 20 mg oral tablet: 1 tab(s) orally once a day (in the evening) digoxin 125 mcg (0.125 mg) oral tablet: 1 tab(s) orally once a day  dilTIAZem 120 mg/24 hours oral capsule, extended release: 1 cap(s) orally once a day  metoprolol succinate 50 mg oral tablet, extended release: 1 tab(s) orally once a day  Multiple Vitamins oral tablet: 1 tab(s) orally once a day  pantoprazole 40 mg oral delayed release tablet: 1 tab(s) orally once a day  ramipril 10 mg oral capsule: 1 cap(s) orally once a day  venlafaxine 150 mg oral capsule, extended release: 1 cap(s) orally once a day  Xarelto 20 mg oral tablet: 1 tab(s) orally once a day (in the evening) digoxin 125 mcg (0.125 mg) oral tablet: 1 tab(s) orally once a day  ramipril 10 mg oral capsule: 1 cap(s) orally once a day  Toprol- mg oral tablet, extended release: 1 tab(s) orally once a day  Xarelto 20 mg oral tablet: 1 tab(s) orally once a day (in the evening)

## 2019-11-21 NOTE — PHYSICAL THERAPY INITIAL EVALUATION ADULT - LIVES WITH, PROFILE
Pt lives alone in a private house. Pt reports 2 stairs to enter with single railing. Pt reports 1 flight of stairs to bedroom with single banister.

## 2020-07-08 NOTE — ED ADULT NURSE NOTE - PRIMARY CARE PROVIDER
unknown Rifampin Counseling: I discussed with the patient the risks of rifampin including but not limited to liver damage, kidney damage, red-orange body fluids, nausea/vomiting and severe allergy.

## 2020-07-17 ENCOUNTER — RESULT REVIEW (OUTPATIENT)
Age: 75
End: 2020-07-17

## 2020-07-17 ENCOUNTER — APPOINTMENT (OUTPATIENT)
Dept: CT IMAGING | Facility: IMAGING CENTER | Age: 75
End: 2020-07-17
Payer: MEDICARE

## 2020-07-17 ENCOUNTER — OUTPATIENT (OUTPATIENT)
Dept: OUTPATIENT SERVICES | Facility: HOSPITAL | Age: 75
LOS: 1 days | End: 2020-07-17
Payer: MEDICARE

## 2020-07-17 DIAGNOSIS — Z00.8 ENCOUNTER FOR OTHER GENERAL EXAMINATION: ICD-10-CM

## 2020-07-17 DIAGNOSIS — Z98.890 OTHER SPECIFIED POSTPROCEDURAL STATES: Chronic | ICD-10-CM

## 2020-07-17 DIAGNOSIS — C34.92 MALIGNANT NEOPLASM OF UNSPECIFIED PART OF LEFT BRONCHUS OR LUNG: ICD-10-CM

## 2020-07-17 PROCEDURE — 71250 CT THORAX DX C-: CPT | Mod: 26

## 2020-07-17 PROCEDURE — 71250 CT THORAX DX C-: CPT

## 2020-07-22 ENCOUNTER — APPOINTMENT (OUTPATIENT)
Dept: THORACIC SURGERY | Facility: CLINIC | Age: 75
End: 2020-07-22
Payer: MEDICARE

## 2020-07-22 DIAGNOSIS — C34.92 MALIGNANT NEOPLASM OF UNSPECIFIED PART OF LEFT BRONCHUS OR LUNG: ICD-10-CM

## 2020-07-22 PROCEDURE — 99442: CPT | Mod: 95

## 2020-07-23 NOTE — CONSULT LETTER
[Dear  ___] : Dear  [unfilled], [Courtesy Letter:] : I had the pleasure of seeing your patient, [unfilled], in my office today. [Please see my note below.] : Please see my note below. [Sincerely,] : Sincerely, [FreeTextEntry3] : Zachary Arroyo MD, FACS \par , Division of Thoracic Surgery \par [facility] \par Chief, Thoracic Surgery \par [facility] \par Department of Cardiovascular & Thoracic Surgery \par  \par [facility] \par \par  [FreeTextEntry2] : Dr. Michael Guzman\par Dr. Ezio Lutz

## 2020-07-23 NOTE — DATA REVIEWED
[FreeTextEntry1] : CT chest 7/17/2020: No pulmonary nodules. The lingular lung hernia with atelectasis (series 2 image 61) within the radiated segment of the lung. The remainder of the lungs are clear. Small right pleural effusion.

## 2020-07-23 NOTE — HISTORY OF PRESENT ILLNESS
[Home] : at home, [unfilled] , at the time of the visit. [Medical Office: ([facility])___] : at the medical office located in  [Verbal consent obtained from patient] : the patient, [unfilled] [FreeTextEntry1] : 75 year old female who is s/p Left VATS, anatomical lingula -sparing LULobectomy and MLND on 4/6/18, pathology revealing adenocarcinoma, acinar predominant, T1bN0. She is a former smoker (1/3 PPD for 15 years having quit in 1980),with past medical history of hypertension, atrial fibrillation s/p ablation and cardioversion, chronic back pain depression and anxiety.\par \par CT Chest on 7/2/19 revealed: No nodules, endobronchial lesions, or pleural effusions noted.\par \par CT chest 7/17/2020: No pulmonary nodules. The lingular lung hernia with atelectasis (series 2 image 61) within the radiated segment of the lung. The remainder of the lungs are clear. Small right pleural effusion. \par \par Pt presents today for follow up via telephonic visit. \par \par

## 2020-07-23 NOTE — ASSESSMENT
[FreeTextEntry1] : 75 year old female who is s/p Left VATS, anatomical lingula -sparing LULobectomy and MLND on 4/6/18, pathology revealing adenocarcinoma, acinar predominant, T1bN0.\par \par CT reivewed with pt. Small Rt pleural effusion, I would like to see pt back in 1 yr with CT Chest w/o contrast. \par I spent 15 mins speaking with pt on the phone regarding above results and plan.\par \par \par I personally performed the services described in the documentation, reviewed the documentation recorded by the scribe in my presence and it accurately and completely records my words and actions. \par \par I, Yaneth Sauceda, NP, am scribing for and the presence of Dr. Zachary Arroyo the following sections, HISTORY OF PRESENT ILLNESS, PAST MEDICAL/FAMILY/SOCIAL HISTORY; REVIEW OF SYSTEMS; VITAL SIGNS; PHYSICAL EXAM; DISPOSITION.\par

## 2020-09-21 ENCOUNTER — APPOINTMENT (OUTPATIENT)
Dept: OTOLARYNGOLOGY | Facility: CLINIC | Age: 75
End: 2020-09-21
Payer: MEDICARE

## 2020-09-21 VITALS
HEART RATE: 81 BPM | SYSTOLIC BLOOD PRESSURE: 141 MMHG | DIASTOLIC BLOOD PRESSURE: 98 MMHG | BODY MASS INDEX: 23.39 KG/M2 | HEIGHT: 64 IN | WEIGHT: 137 LBS | TEMPERATURE: 97.2 F

## 2020-09-21 PROCEDURE — 69210 REMOVE IMPACTED EAR WAX UNI: CPT

## 2020-09-21 PROCEDURE — 99203 OFFICE O/P NEW LOW 30 MIN: CPT | Mod: 25

## 2020-09-21 NOTE — PHYSICAL EXAM
[Midline] : trachea located in midline position [Normal] : no rashes [de-identified] : b/l mynorn

## 2020-09-21 NOTE — ASSESSMENT
[FreeTextEntry1] : Wax:\par -Cerumen is removed from the right and left  ear canal with a curette and suction.\par -Rx:Debrox be placed in both ears on a routine basis to keep them free of wax.\par -Routine debridement suggested to keep the ears free of wax.\par -Pressure in ears resolved with wax removal \par \par Pt does not wish to have hearing tested today\par \par f/u 6 months or prn

## 2020-09-21 NOTE — HISTORY OF PRESENT ILLNESS
[Tinnitus] : tinnitus [No] : patient does not have a  history of radiation therapy [de-identified] : 74 yo female\par Patient complains of left ear tinnitus and pressure x 1 month. States she feels like she is on an airplane. No change in hearing. \par Pt has no ear pain, ear drainage, hearing loss, vertigo, nasal congestion, nasal discharge, epistaxis, sinus infections, facial pain, facial pressure, throat pain, dysphagia or fevers\par \par  [Anxiety] : no anxiety [Dizziness] : no dizziness [Headache] : no headache [Hearing Loss] : no hearing loss [Recurrent Otitis Media] : no recurrent otitis media [Otitis Media with Effusion] : no otitis media with effusion [Presbycusis] : no presbycusis [Congenital Ear Malformation] : no congenital ear malformation [Meniere Disease] : no Meniere disease [Otosclerosis] : no otosclerosis [Perilymphatic Fistula] : no perilymphatic fistula [Loud Noise Exposure] : no history of loud noise exposure [Smoking] : no smoking [Early Onset Hearing Loss] : no early onset hearing loss [Stroke] : no stroke [Facial Pain] : no facial pain [Facial Pressure] : no facial pressure [Nasal Congestion] : no nasal congestion [Diplopia] : no diplopia [Ear Fullness] : no ear fullness [Allergic Rhinitis] : no allergic rhinitis [Maxillary Tooth Infection] : no maxillary tooth infection [Septal Deviation] : no septal deviation [Environmental Allergies] : no environmental allergies [Seasonal Allergies] : no seasonal allergies [Environmental Allergens] : no environmental allergens [Adenoidectomy] : no adenoidectomy [Nasal FB Removal] : no nasal foreign body removal [Allergies] : no allergies [Asthma] : no asthma [Neck Mass] : no neck mass [Neck Pain] : no neck pain [Chills] : no chills [Cold Intolerance] : no cold intolerance [Cough] : no cough [Fatigue] : no fatigue [Heat Intolerance] : no heat intolerance [Hyperthyroidism] : no hyperthyroidism [Sialadenitis] : no sialadenitis [Hodgkin Disease] : no hodgkin disease [Non-Hodgkin Lymphoma] : no non-hodgkin lymphoma [Tobacco Use] : no tobacco use [Alcohol Use] : no alcohol use [Graves Disease] : no graves disease [Thyroid Cancer] : no thyroid cancer

## 2021-08-16 ENCOUNTER — INPATIENT (INPATIENT)
Facility: HOSPITAL | Age: 76
LOS: 3 days | Discharge: HOME | End: 2021-08-20
Attending: STUDENT IN AN ORGANIZED HEALTH CARE EDUCATION/TRAINING PROGRAM | Admitting: STUDENT IN AN ORGANIZED HEALTH CARE EDUCATION/TRAINING PROGRAM
Payer: MEDICARE

## 2021-08-16 VITALS
OXYGEN SATURATION: 97 % | TEMPERATURE: 98 F | HEIGHT: 69 IN | DIASTOLIC BLOOD PRESSURE: 84 MMHG | SYSTOLIC BLOOD PRESSURE: 155 MMHG | RESPIRATION RATE: 16 BRPM | HEART RATE: 107 BPM

## 2021-08-16 DIAGNOSIS — Z98.890 OTHER SPECIFIED POSTPROCEDURAL STATES: Chronic | ICD-10-CM

## 2021-08-16 DIAGNOSIS — F10.239 ALCOHOL DEPENDENCE WITH WITHDRAWAL, UNSPECIFIED: ICD-10-CM

## 2021-08-16 LAB
ALBUMIN SERPL ELPH-MCNC: 4.7 G/DL — SIGNIFICANT CHANGE UP (ref 3.3–5)
ALP SERPL-CCNC: 72 U/L — SIGNIFICANT CHANGE UP (ref 40–120)
ALT FLD-CCNC: 16 U/L — SIGNIFICANT CHANGE UP (ref 4–33)
AMPHET UR-MCNC: NEGATIVE — SIGNIFICANT CHANGE UP
ANION GAP SERPL CALC-SCNC: 19 MMOL/L — HIGH (ref 7–14)
APAP SERPL-MCNC: <15 UG/ML — SIGNIFICANT CHANGE UP (ref 15–25)
AST SERPL-CCNC: 34 U/L — HIGH (ref 4–32)
BARBITURATES UR SCN-MCNC: NEGATIVE — SIGNIFICANT CHANGE UP
BASOPHILS # BLD AUTO: 0.01 K/UL — SIGNIFICANT CHANGE UP (ref 0–0.2)
BASOPHILS NFR BLD AUTO: 0.2 % — SIGNIFICANT CHANGE UP (ref 0–2)
BENZODIAZ UR-MCNC: POSITIVE
BILIRUB SERPL-MCNC: 0.3 MG/DL — SIGNIFICANT CHANGE UP (ref 0.2–1.2)
BUN SERPL-MCNC: 6 MG/DL — LOW (ref 7–23)
CALCIUM SERPL-MCNC: 9.4 MG/DL — SIGNIFICANT CHANGE UP (ref 8.4–10.5)
CHLORIDE SERPL-SCNC: 104 MMOL/L — SIGNIFICANT CHANGE UP (ref 98–107)
CO2 SERPL-SCNC: 22 MMOL/L — SIGNIFICANT CHANGE UP (ref 22–31)
COCAINE METAB.OTHER UR-MCNC: NEGATIVE — SIGNIFICANT CHANGE UP
CREAT SERPL-MCNC: 0.44 MG/DL — LOW (ref 0.5–1.3)
CREATININE URINE RESULT, DAU: 17 MG/DL — SIGNIFICANT CHANGE UP
EOSINOPHIL # BLD AUTO: 0.02 K/UL — SIGNIFICANT CHANGE UP (ref 0–0.5)
EOSINOPHIL NFR BLD AUTO: 0.4 % — SIGNIFICANT CHANGE UP (ref 0–6)
ETHANOL SERPL-MCNC: 225 MG/DL — HIGH
GLUCOSE SERPL-MCNC: 93 MG/DL — SIGNIFICANT CHANGE UP (ref 70–99)
HCT VFR BLD CALC: 40.5 % — SIGNIFICANT CHANGE UP (ref 34.5–45)
HGB BLD-MCNC: 13.1 G/DL — SIGNIFICANT CHANGE UP (ref 11.5–15.5)
IANC: 2.46 K/UL — SIGNIFICANT CHANGE UP (ref 1.5–8.5)
IMM GRANULOCYTES NFR BLD AUTO: 0.4 % — SIGNIFICANT CHANGE UP (ref 0–1.5)
LYMPHOCYTES # BLD AUTO: 1.75 K/UL — SIGNIFICANT CHANGE UP (ref 1–3.3)
LYMPHOCYTES # BLD AUTO: 38 % — SIGNIFICANT CHANGE UP (ref 13–44)
MCHC RBC-ENTMCNC: 32.3 GM/DL — SIGNIFICANT CHANGE UP (ref 32–36)
MCHC RBC-ENTMCNC: 33.2 PG — SIGNIFICANT CHANGE UP (ref 27–34)
MCV RBC AUTO: 102.5 FL — HIGH (ref 80–100)
METHADONE UR-MCNC: NEGATIVE — SIGNIFICANT CHANGE UP
MONOCYTES # BLD AUTO: 0.34 K/UL — SIGNIFICANT CHANGE UP (ref 0–0.9)
MONOCYTES NFR BLD AUTO: 7.4 % — SIGNIFICANT CHANGE UP (ref 2–14)
NEUTROPHILS # BLD AUTO: 2.46 K/UL — SIGNIFICANT CHANGE UP (ref 1.8–7.4)
NEUTROPHILS NFR BLD AUTO: 53.6 % — SIGNIFICANT CHANGE UP (ref 43–77)
NRBC # BLD: 0 /100 WBCS — SIGNIFICANT CHANGE UP
NRBC # FLD: 0 K/UL — SIGNIFICANT CHANGE UP
OPIATES UR-MCNC: NEGATIVE — SIGNIFICANT CHANGE UP
OXYCODONE UR-MCNC: POSITIVE
PCP SPEC-MCNC: SIGNIFICANT CHANGE UP
PCP UR-MCNC: NEGATIVE — SIGNIFICANT CHANGE UP
PLATELET # BLD AUTO: 191 K/UL — SIGNIFICANT CHANGE UP (ref 150–400)
POTASSIUM SERPL-MCNC: 4.1 MMOL/L — SIGNIFICANT CHANGE UP (ref 3.5–5.3)
POTASSIUM SERPL-SCNC: 4.1 MMOL/L — SIGNIFICANT CHANGE UP (ref 3.5–5.3)
PROT SERPL-MCNC: 7.4 G/DL — SIGNIFICANT CHANGE UP (ref 6–8.3)
RBC # BLD: 3.95 M/UL — SIGNIFICANT CHANGE UP (ref 3.8–5.2)
RBC # FLD: 12.6 % — SIGNIFICANT CHANGE UP (ref 10.3–14.5)
SALICYLATES SERPL-MCNC: <5 MG/DL — LOW (ref 15–30)
SODIUM SERPL-SCNC: 145 MMOL/L — SIGNIFICANT CHANGE UP (ref 135–145)
THC UR QL: NEGATIVE — SIGNIFICANT CHANGE UP
TOXICOLOGY SCREEN, DRUGS OF ABUSE, SERUM RESULT: SIGNIFICANT CHANGE UP
TSH SERPL-MCNC: 0.57 UIU/ML — SIGNIFICANT CHANGE UP (ref 0.27–4.2)
WBC # BLD: 4.6 K/UL — SIGNIFICANT CHANGE UP (ref 3.8–10.5)
WBC # FLD AUTO: 4.6 K/UL — SIGNIFICANT CHANGE UP (ref 3.8–10.5)

## 2021-08-16 PROCEDURE — 99285 EMERGENCY DEPT VISIT HI MDM: CPT | Mod: 25

## 2021-08-16 PROCEDURE — 93010 ELECTROCARDIOGRAM REPORT: CPT

## 2021-08-16 RX ORDER — DIAZEPAM 5 MG
10 TABLET ORAL ONCE
Refills: 0 | Status: DISCONTINUED | OUTPATIENT
Start: 2021-08-16 | End: 2021-08-16

## 2021-08-16 RX ORDER — RIVAROXABAN 15 MG-20MG
20 KIT ORAL ONCE
Refills: 0 | Status: COMPLETED | OUTPATIENT
Start: 2021-08-16 | End: 2021-08-16

## 2021-08-16 RX ORDER — SODIUM CHLORIDE 9 MG/ML
1000 INJECTION INTRAMUSCULAR; INTRAVENOUS; SUBCUTANEOUS ONCE
Refills: 0 | Status: COMPLETED | OUTPATIENT
Start: 2021-08-16 | End: 2021-08-16

## 2021-08-16 RX ORDER — DIAZEPAM 5 MG
5 TABLET ORAL ONCE
Refills: 0 | Status: DISCONTINUED | OUTPATIENT
Start: 2021-08-16 | End: 2021-08-16

## 2021-08-16 RX ORDER — ACETAMINOPHEN 500 MG
650 TABLET ORAL ONCE
Refills: 0 | Status: COMPLETED | OUTPATIENT
Start: 2021-08-16 | End: 2021-08-16

## 2021-08-16 RX ADMIN — SODIUM CHLORIDE 1000 MILLILITER(S): 9 INJECTION INTRAMUSCULAR; INTRAVENOUS; SUBCUTANEOUS at 19:45

## 2021-08-16 RX ADMIN — Medication 5 MILLIGRAM(S): at 19:45

## 2021-08-16 RX ADMIN — Medication 10 MILLIGRAM(S): at 21:38

## 2021-08-16 RX ADMIN — Medication 5 MILLIGRAM(S): at 18:39

## 2021-08-16 RX ADMIN — Medication 650 MILLIGRAM(S): at 21:38

## 2021-08-16 RX ADMIN — SODIUM CHLORIDE 1000 MILLILITER(S): 9 INJECTION INTRAMUSCULAR; INTRAVENOUS; SUBCUTANEOUS at 18:39

## 2021-08-16 RX ADMIN — RIVAROXABAN 20 MILLIGRAM(S): KIT at 21:38

## 2021-08-16 NOTE — ED PROVIDER NOTE - CADM POA PRESS ULCER
What Type Of Note Output Would You Prefer (Optional)?: Standard Output Hpi Title: Evaluation of Skin Lesions How Severe Are Your Spot(S)?: moderate Have Your Spot(S) Been Treated In The Past?: has not been treated No

## 2021-08-16 NOTE — ED ADULT NURSE REASSESSMENT NOTE - NS ED NURSE REASSESS COMMENT FT1
Pt resting in stretcher. Pt denies cp, sob, abd pain, dizziness, n//v/d. Pt c/o a HA, very anxious. Pt has mild tremors with arms extended. Respirations even and unlabored, no accessory muscle use. CIWA as noted. Vitas as noted. MD aware of vitals

## 2021-08-16 NOTE — ED PROVIDER NOTE - PHYSICAL EXAMINATION
Physical Exam:  Gen: minimally slurred speech, mild tremors to fingers noted, awake alert   Head: NCAT  HEENT: EOMI, PEERL, normal conjunctiva, oral mucosa moist  Lung: CTAB, no respiratory distress, no wheezes/rhonchi/rales B/L, speaking in full sentences  CV: RRR, no murmurs, rubs or gallops  Abd: soft, NT, ND, no guarding, no rigidity, no rebound tenderness, no CVA tenderness   MSK: no visible deformities  Neuro: No focal sensory or motor deficits  Skin: Warm, well perfused, no rash, no leg swelling  Psych: normal affect, calm  ~Yobani Devries MD (PGY-2) Physical Exam:  Gen: minimally slurred speech, awake alert   HEENT: tongue fasciculations noted, EOMI, normal conjunctiva, oral mucosa moist  Lung: CTAB, no respiratory distress, no wheezes/rhonchi/rales B/L, speaking in full sentences  CV: RRR, no murmurs, rubs or gallops  Abd: soft, NT, ND, no guarding, no rigidity, no rebound tenderness, no CVA tenderness   MSK: pt with mild visible tremors in UE's, no visible deformities  Neuro: No focal sensory or motor deficits  Skin: Warm, well perfused, no rash, no leg swelling  Psych: normal affect, calm  ~Yobani Devries MD (PGY-2)

## 2021-08-16 NOTE — ED ADULT NURSE NOTE - OBJECTIVE STATEMENT
Pt awake, alert and oriented x 4 presents with PMH alcohol abuse requesting admission for detox and rehab.   Pt also reports passive SI stating "I wish I would go to sleep and not wake up."    Denies any suicidal plan, denies HI.   Calm and cooperative.   CIWA score 4 - diazepam given as ordered.   IV placed, blood work sent.   Respirations even and unlabored.   Denies headache, denies n/v/d.    Awaiting further plan of care.

## 2021-08-16 NOTE — ED PROVIDER NOTE - OBJECTIVE STATEMENT
76F PMH afib on Xarelto, lung ca, depression, arthritis, chronic alcohol abuse presents with depression. States she has had depression since she was a child 76F PMH afib on Xarelto, lung ca, depression, arthritis, chronic alcohol abuse presents with depression. States she has had depression since she was a child. For the past 2-3 months, she has gotten more depressed due to feeling old, having decreased libido, and hearing stories about people dying from COVID. She states she wants to die and would like to sleep and not wake up. She denies having any suicidal plan. Did attempt to cut her wrists many years ago as a suicide attempt. Endorses minimal suprapubic pressure w/o urinary symptoms. Drinks about 6 shots of vodka and 1.5 bottles of wine daily. Today she drank about 7-8 shots of vodka thus far. No HI, hallucinations, fevers/chills, chest pain, SOB, nausea/vomiting. Reports feeling shaky

## 2021-08-16 NOTE — ED ADULT TRIAGE NOTE - CHIEF COMPLAINT QUOTE
c/o depression and anxiety. states "I just wanna go to sleep and die." denies plan. states many years ago slit her wrists. admits to drinking alcohol today, unsure amount. states has been drinking every day. A+OX4. requesting detox.

## 2021-08-16 NOTE — ED PROVIDER NOTE - CLINICAL SUMMARY MEDICAL DECISION MAKING FREE TEXT BOX
76F presenting for alcohol abuse and suicidal ideation. VSS in ED. Exam w/o any grossly abnormal findings, including no abd tenderness  Will order psych labs, ua/ucx to evaluate subjective suprapubic pressure, psych consult, reassess 76F presenting for alcohol abuse and suicidal ideation. VSS in ED. Exam displaying signs of mild alcohol withdrawal  Will order psych labs, ua/ucx to evaluate subjective suprapubic pressure, psych consult, likely admit for alcohol withdrawal management

## 2021-08-16 NOTE — ED PROVIDER NOTE - ATTENDING CONTRIBUTION TO CARE
I (Yen) agree with above, I performed a history and physical. Counseled cl medical staff on medical decision making as documented, additionally and/or with the following exceptions: The patient presented with signs and symptoms of etoh withdrawal. Furthermore, her etoh level was >200, indicating she was high risk for DT's. Although she had reported SI, the SI was passive in nature and she had no plan for a suicide. She was admitted for EtOH WD and psych C/L service.

## 2021-08-16 NOTE — ED ADULT NURSE REASSESSMENT NOTE - NS ED NURSE REASSESS COMMENT FT1
pt changed into hospital gown. belongings checked, placed across 21. awaiting room assignment in no acute distress.

## 2021-08-16 NOTE — ED PROVIDER NOTE - NS ED ROS FT
CONST: +depression, no fevers, no chills  EYES:  no vision changes  ENT: no sore throat  CV: no chest pain, no leg swelling  RESP: no shortness of breath, no cough  ABD: +suprapubic pressure, no nausea, no vomiting, no diarrhea  : no dysuria, no flank pain, no hematuria  MSK: +shaky, no back pain, no extremity pain  NEURO: no headache or additional neurologic complaints  SKIN:  no rash

## 2021-08-17 DIAGNOSIS — C34.90 MALIGNANT NEOPLASM OF UNSPECIFIED PART OF UNSPECIFIED BRONCHUS OR LUNG: ICD-10-CM

## 2021-08-17 DIAGNOSIS — Z79.899 OTHER LONG TERM (CURRENT) DRUG THERAPY: ICD-10-CM

## 2021-08-17 DIAGNOSIS — R10.2 PELVIC AND PERINEAL PAIN: ICD-10-CM

## 2021-08-17 DIAGNOSIS — I48.91 UNSPECIFIED ATRIAL FIBRILLATION: ICD-10-CM

## 2021-08-17 DIAGNOSIS — I10 ESSENTIAL (PRIMARY) HYPERTENSION: ICD-10-CM

## 2021-08-17 DIAGNOSIS — C25.9 MALIGNANT NEOPLASM OF PANCREAS, UNSPECIFIED: ICD-10-CM

## 2021-08-17 DIAGNOSIS — F32.2 MAJOR DEPRESSIVE DISORDER, SINGLE EPISODE, SEVERE WITHOUT PSYCHOTIC FEATURES: ICD-10-CM

## 2021-08-17 DIAGNOSIS — R45.851 SUICIDAL IDEATIONS: ICD-10-CM

## 2021-08-17 DIAGNOSIS — Z29.9 ENCOUNTER FOR PROPHYLACTIC MEASURES, UNSPECIFIED: ICD-10-CM

## 2021-08-17 DIAGNOSIS — F32.9 MAJOR DEPRESSIVE DISORDER, SINGLE EPISODE, UNSPECIFIED: ICD-10-CM

## 2021-08-17 DIAGNOSIS — F10.230 ALCOHOL DEPENDENCE WITH WITHDRAWAL, UNCOMPLICATED: ICD-10-CM

## 2021-08-17 DIAGNOSIS — F10.239 ALCOHOL DEPENDENCE WITH WITHDRAWAL, UNSPECIFIED: ICD-10-CM

## 2021-08-17 LAB
A1C WITH ESTIMATED AVERAGE GLUCOSE RESULT: 4.7 % — SIGNIFICANT CHANGE UP (ref 4–5.6)
ANION GAP SERPL CALC-SCNC: 15 MMOL/L — HIGH (ref 7–14)
APPEARANCE UR: CLEAR — SIGNIFICANT CHANGE UP
BASOPHILS # BLD AUTO: 0.01 K/UL — SIGNIFICANT CHANGE UP (ref 0–0.2)
BASOPHILS NFR BLD AUTO: 0.2 % — SIGNIFICANT CHANGE UP (ref 0–2)
BILIRUB UR-MCNC: NEGATIVE — SIGNIFICANT CHANGE UP
BUN SERPL-MCNC: 6 MG/DL — LOW (ref 7–23)
CALCIUM SERPL-MCNC: 8.5 MG/DL — SIGNIFICANT CHANGE UP (ref 8.4–10.5)
CHLORIDE SERPL-SCNC: 102 MMOL/L — SIGNIFICANT CHANGE UP (ref 98–107)
CHOLEST SERPL-MCNC: 198 MG/DL — SIGNIFICANT CHANGE UP
CO2 SERPL-SCNC: 24 MMOL/L — SIGNIFICANT CHANGE UP (ref 22–31)
COLOR SPEC: YELLOW — SIGNIFICANT CHANGE UP
CREAT SERPL-MCNC: 0.41 MG/DL — LOW (ref 0.5–1.3)
CULTURE RESULTS: SIGNIFICANT CHANGE UP
DIFF PNL FLD: ABNORMAL
EOSINOPHIL # BLD AUTO: 0.02 K/UL — SIGNIFICANT CHANGE UP (ref 0–0.5)
EOSINOPHIL NFR BLD AUTO: 0.4 % — SIGNIFICANT CHANGE UP (ref 0–6)
ESTIMATED AVERAGE GLUCOSE: 88 — SIGNIFICANT CHANGE UP
FOLATE SERPL-MCNC: 15.1 NG/ML — SIGNIFICANT CHANGE UP (ref 3.1–17.5)
GLUCOSE SERPL-MCNC: 87 MG/DL — SIGNIFICANT CHANGE UP (ref 70–99)
GLUCOSE UR QL: NEGATIVE — SIGNIFICANT CHANGE UP
HCT VFR BLD CALC: 34.1 % — LOW (ref 34.5–45)
HDLC SERPL-MCNC: 91 MG/DL — SIGNIFICANT CHANGE UP
HGB BLD-MCNC: 11.3 G/DL — LOW (ref 11.5–15.5)
IANC: 2.47 K/UL — SIGNIFICANT CHANGE UP (ref 1.5–8.5)
IMM GRANULOCYTES NFR BLD AUTO: 0.2 % — SIGNIFICANT CHANGE UP (ref 0–1.5)
KETONES UR-MCNC: ABNORMAL
LEUKOCYTE ESTERASE UR-ACNC: ABNORMAL
LIPID PNL WITH DIRECT LDL SERPL: 99 MG/DL — SIGNIFICANT CHANGE UP
LYMPHOCYTES # BLD AUTO: 1.54 K/UL — SIGNIFICANT CHANGE UP (ref 1–3.3)
LYMPHOCYTES # BLD AUTO: 33.9 % — SIGNIFICANT CHANGE UP (ref 13–44)
MAGNESIUM SERPL-MCNC: 1.4 MG/DL — LOW (ref 1.6–2.6)
MCHC RBC-ENTMCNC: 33.1 GM/DL — SIGNIFICANT CHANGE UP (ref 32–36)
MCHC RBC-ENTMCNC: 33.3 PG — SIGNIFICANT CHANGE UP (ref 27–34)
MCV RBC AUTO: 100.6 FL — HIGH (ref 80–100)
MONOCYTES # BLD AUTO: 0.49 K/UL — SIGNIFICANT CHANGE UP (ref 0–0.9)
MONOCYTES NFR BLD AUTO: 10.8 % — SIGNIFICANT CHANGE UP (ref 2–14)
NEUTROPHILS # BLD AUTO: 2.47 K/UL — SIGNIFICANT CHANGE UP (ref 1.8–7.4)
NEUTROPHILS NFR BLD AUTO: 54.5 % — SIGNIFICANT CHANGE UP (ref 43–77)
NITRITE UR-MCNC: NEGATIVE — SIGNIFICANT CHANGE UP
NON HDL CHOLESTEROL: 107 MG/DL — SIGNIFICANT CHANGE UP
NRBC # BLD: 0 /100 WBCS — SIGNIFICANT CHANGE UP
NRBC # FLD: 0 K/UL — SIGNIFICANT CHANGE UP
PH UR: 7.5 — SIGNIFICANT CHANGE UP (ref 5–8)
PHOSPHATE SERPL-MCNC: 2.5 MG/DL — SIGNIFICANT CHANGE UP (ref 2.5–4.5)
PLATELET # BLD AUTO: 166 K/UL — SIGNIFICANT CHANGE UP (ref 150–400)
POTASSIUM SERPL-MCNC: 3.5 MMOL/L — SIGNIFICANT CHANGE UP (ref 3.5–5.3)
POTASSIUM SERPL-SCNC: 3.5 MMOL/L — SIGNIFICANT CHANGE UP (ref 3.5–5.3)
PROT UR-MCNC: ABNORMAL
RBC # BLD: 3.39 M/UL — LOW (ref 3.8–5.2)
RBC # FLD: 12.5 % — SIGNIFICANT CHANGE UP (ref 10.3–14.5)
SARS-COV-2 RNA SPEC QL NAA+PROBE: SIGNIFICANT CHANGE UP
SODIUM SERPL-SCNC: 141 MMOL/L — SIGNIFICANT CHANGE UP (ref 135–145)
SP GR SPEC: 1.02 — SIGNIFICANT CHANGE UP (ref 1.01–1.02)
SPECIMEN SOURCE: SIGNIFICANT CHANGE UP
TRIGL SERPL-MCNC: 40 MG/DL — SIGNIFICANT CHANGE UP
TSH SERPL-MCNC: 1.4 UIU/ML — SIGNIFICANT CHANGE UP (ref 0.27–4.2)
UROBILINOGEN FLD QL: SIGNIFICANT CHANGE UP
VIT B12 SERPL-MCNC: 209 PG/ML — SIGNIFICANT CHANGE UP (ref 200–900)
WBC # BLD: 4.54 K/UL — SIGNIFICANT CHANGE UP (ref 3.8–10.5)
WBC # FLD AUTO: 4.54 K/UL — SIGNIFICANT CHANGE UP (ref 3.8–10.5)

## 2021-08-17 PROCEDURE — 99223 1ST HOSP IP/OBS HIGH 75: CPT

## 2021-08-17 PROCEDURE — 12345: CPT | Mod: NC

## 2021-08-17 PROCEDURE — 90792 PSYCH DIAG EVAL W/MED SRVCS: CPT

## 2021-08-17 RX ORDER — DIGOXIN 250 MCG
1 TABLET ORAL
Qty: 0 | Refills: 0 | DISCHARGE

## 2021-08-17 RX ORDER — RAMIPRIL 5 MG
1 CAPSULE ORAL
Qty: 0 | Refills: 0 | DISCHARGE

## 2021-08-17 RX ORDER — THIAMINE MONONITRATE (VIT B1) 100 MG
500 TABLET ORAL THREE TIMES A DAY
Refills: 0 | Status: COMPLETED | OUTPATIENT
Start: 2021-08-17 | End: 2021-08-20

## 2021-08-17 RX ORDER — TRAZODONE HCL 50 MG
100 TABLET ORAL AT BEDTIME
Refills: 0 | Status: DISCONTINUED | OUTPATIENT
Start: 2021-08-17 | End: 2021-08-17

## 2021-08-17 RX ORDER — SERTRALINE 25 MG/1
50 TABLET, FILM COATED ORAL DAILY
Refills: 0 | Status: DISCONTINUED | OUTPATIENT
Start: 2021-08-17 | End: 2021-08-20

## 2021-08-17 RX ORDER — FOLIC ACID 0.8 MG
1 TABLET ORAL DAILY
Refills: 0 | Status: DISCONTINUED | OUTPATIENT
Start: 2021-08-17 | End: 2021-08-20

## 2021-08-17 RX ORDER — MAGNESIUM SULFATE 500 MG/ML
1 VIAL (ML) INJECTION ONCE
Refills: 0 | Status: COMPLETED | OUTPATIENT
Start: 2021-08-17 | End: 2021-08-17

## 2021-08-17 RX ORDER — METOPROLOL TARTRATE 50 MG
100 TABLET ORAL DAILY
Refills: 0 | Status: DISCONTINUED | OUTPATIENT
Start: 2021-08-17 | End: 2021-08-20

## 2021-08-17 RX ORDER — TRAZODONE HCL 50 MG
50 TABLET ORAL AT BEDTIME
Refills: 0 | Status: DISCONTINUED | OUTPATIENT
Start: 2021-08-17 | End: 2021-08-20

## 2021-08-17 RX ORDER — ALPRAZOLAM 0.25 MG
1 TABLET ORAL
Qty: 0 | Refills: 0 | DISCHARGE

## 2021-08-17 RX ORDER — SODIUM CHLORIDE 9 MG/ML
3 INJECTION INTRAMUSCULAR; INTRAVENOUS; SUBCUTANEOUS EVERY 8 HOURS
Refills: 0 | Status: DISCONTINUED | OUTPATIENT
Start: 2021-08-17 | End: 2021-08-20

## 2021-08-17 RX ORDER — THIAMINE MONONITRATE (VIT B1) 100 MG
100 TABLET ORAL DAILY
Refills: 0 | Status: DISCONTINUED | OUTPATIENT
Start: 2021-08-17 | End: 2021-08-17

## 2021-08-17 RX ORDER — PREGABALIN 225 MG/1
1000 CAPSULE ORAL DAILY
Refills: 0 | Status: DISCONTINUED | OUTPATIENT
Start: 2021-08-17 | End: 2021-08-20

## 2021-08-17 RX ORDER — DIGOXIN 250 MCG
125 TABLET ORAL DAILY
Refills: 0 | Status: DISCONTINUED | OUTPATIENT
Start: 2021-08-17 | End: 2021-08-20

## 2021-08-17 RX ORDER — RIVAROXABAN 15 MG-20MG
20 KIT ORAL
Refills: 0 | Status: DISCONTINUED | OUTPATIENT
Start: 2021-08-17 | End: 2021-08-20

## 2021-08-17 RX ADMIN — Medication 2 MILLIGRAM(S): at 03:44

## 2021-08-17 RX ADMIN — Medication 2 MILLIGRAM(S): at 14:43

## 2021-08-17 RX ADMIN — SERTRALINE 50 MILLIGRAM(S): 25 TABLET, FILM COATED ORAL at 14:43

## 2021-08-17 RX ADMIN — RIVAROXABAN 20 MILLIGRAM(S): KIT at 17:31

## 2021-08-17 RX ADMIN — SODIUM CHLORIDE 3 MILLILITER(S): 9 INJECTION INTRAMUSCULAR; INTRAVENOUS; SUBCUTANEOUS at 15:38

## 2021-08-17 RX ADMIN — Medication 105 MILLIGRAM(S): at 23:05

## 2021-08-17 RX ADMIN — PREGABALIN 1000 MICROGRAM(S): 225 CAPSULE ORAL at 12:19

## 2021-08-17 RX ADMIN — Medication 100 GRAM(S): at 09:44

## 2021-08-17 RX ADMIN — Medication 2 MILLIGRAM(S): at 01:21

## 2021-08-17 RX ADMIN — Medication 1.5 MILLIGRAM(S): at 23:05

## 2021-08-17 RX ADMIN — Medication 1 MILLIGRAM(S): at 12:19

## 2021-08-17 RX ADMIN — Medication 125 MICROGRAM(S): at 14:44

## 2021-08-17 RX ADMIN — Medication 2 MILLIGRAM(S): at 18:07

## 2021-08-17 RX ADMIN — Medication 2 MILLIGRAM(S): at 05:38

## 2021-08-17 RX ADMIN — Medication 50 MILLIGRAM(S): at 23:11

## 2021-08-17 RX ADMIN — SODIUM CHLORIDE 3 MILLILITER(S): 9 INJECTION INTRAMUSCULAR; INTRAVENOUS; SUBCUTANEOUS at 23:22

## 2021-08-17 RX ADMIN — SODIUM CHLORIDE 3 MILLILITER(S): 9 INJECTION INTRAMUSCULAR; INTRAVENOUS; SUBCUTANEOUS at 05:44

## 2021-08-17 RX ADMIN — Medication 2 MILLIGRAM(S): at 09:35

## 2021-08-17 RX ADMIN — Medication 105 MILLIGRAM(S): at 16:09

## 2021-08-17 RX ADMIN — Medication 100 MILLIGRAM(S): at 14:43

## 2021-08-17 NOTE — PROGRESS NOTE ADULT - PROBLEM SELECTOR PLAN 3
Rate controlled   - c/w home digoxin 125mcg daily and metoprolol succinate 100mg daily   - [id number] 3 - c/w xarelto 20mg daily Rate controlled   - c/w home digoxin 125mcg daily (check level) and metoprolol succinate 100mg daily   - [id number] 3 - c/w xarelto 20mg daily Rate controlled   - c/w home digoxin 125mcg daily (check level) and metoprolol succinate 100mg daily   - [id number] 3 - c/w Xarelto 20mg daily

## 2021-08-17 NOTE — BH CONSULTATION LIAISON ASSESSMENT NOTE - HPI (INCLUDE ILLNESS QUALITY, SEVERITY, DURATION, TIMING, CONTEXT, MODIFYING FACTORS, ASSOCIATED SIGNS AND SYMPTOMS)
76F , domiciled w/ , 2 children, homemaker w/ PMH of Lung CA, arthritis, and AFib w/ PPHx of depression, anxiety, chronic alcohol abuse, BZD/Opioid use w/ 1 prior psychiatric admission 20 years ago for suicide attempt via cutting wrist and multiple attempts at detox via rehab presents to the ED complaining of depression w/ SI and EtOH withdrawal. Psychiatry consulted for SI/EtOH management.     Patient seen AOx4, anxious, full affect, linear, endorsing depression and passive suicidality. Since COVID has been more depressed/anxious and not wanting to leave the house, therefore, has been drinking more often about 5-6 shots of vodka and 1-2 bottles of wine per day and has attempted multiple times to detox at different rehab facilities in the last couple of years. Reports 1 SA over 20 years ago during menopause and has not attempted since. She reports being more often than not compliant w/ medications prescribed by PCP - Unsuccessful attempts in seeing a psychiatrist. Patient taking Xanax 2-3 times per day for "panic attacks" and also Percocet BID for OA pain. Reports decreased appetite and adequate sleep also taking Trazodone. Currently patient still passively suicidal not wanting to live in such an overwhelming environment, however, she has no plan/intent. Also denies HI/AH/VH and remains future-oriented and goal-oriented to return home to sit outside and enjoy her yard and .    I-STOP reviewed # [id number]; Attempted to speak to , however, no answer.

## 2021-08-17 NOTE — H&P ADULT - PROBLEM SELECTOR PLAN 2
Psych consult called.  Patient not on 1:1.  Patient without suicidal plan.  Monitor closely. Pt described passive SI stating "I just want to die" but denies active SI or plan.   Psych consult called.

## 2021-08-17 NOTE — ED ADULT NURSE REASSESSMENT NOTE - NS ED NURSE REASSESS COMMENT FT1
Pt resting in stretcher. Pt denies cp, sob, abd pain, ha, dizziness, n./v/d, fevers/chills. Respirations even and unlabored, no accessory muscle use. CIWA as noted. Vitals as noted. Respirations even and unlabored, no accessory muscle use. Pt resting in stretcher. Pt denies cp, sob, abd pain, ha, dizziness, n./v/d, fevers/chills. Pt denies any SI/HI, auditory or visual hallucinations.  Respirations even and unlabored, no accessory muscle use. CIWA as noted. Vitals as noted. Respirations even and unlabored, no accessory muscle use.

## 2021-08-17 NOTE — H&P ADULT - PROBLEM SELECTOR PLAN 1
Admit to Medicine, continue monitoring.  Patient placed on high risk CIWA.  Last CIWA score 9.  MonItor closely.  B12, Folate ordered. Follow up b12 level in AM.  Fall, aspiration and seizure precautions ordered. Admit to Medicine, continue monitoring.  Patient placed on high risk CIWA.  Patient also placed on ativan taper.  Last CIWA score 9.  MonItor closely.  B12, Folate ordered. Follow up b12 level in AM.  Fall, aspiration and seizure precautions ordered.

## 2021-08-17 NOTE — BH CONSULTATION LIAISON ASSESSMENT NOTE - NSBHCHARTREVIEWVS_PSY_A_CORE FT
Vital Signs Last 24 Hrs  T(C): 37.3 (17 Aug 2021 12:15), Max: 37.3 (17 Aug 2021 12:15)  T(F): 99.2 (17 Aug 2021 12:15), Max: 99.2 (17 Aug 2021 12:15)  HR: 105 (17 Aug 2021 12:15) (97 - 107)  BP: 150/94 (17 Aug 2021 12:15) (125/66 - 163/110)  BP(mean): --  RR: 17 (17 Aug 2021 12:15) (16 - 18)  SpO2: 100% (17 Aug 2021 12:15) (97% - 100%)

## 2021-08-17 NOTE — BH CONSULTATION LIAISON ASSESSMENT NOTE - NSSUICPROTFACT_PSY_ALL_CORE
Responsibility to children, family, or others/Identifies reasons for living/Supportive social network of family or friends/Fear of death or the actual act of killing self/Cultural, spiritual and/or moral attitudes against suicide/Restorationism beliefs

## 2021-08-17 NOTE — H&P ADULT - HISTORY OF PRESENT ILLNESS
77 y/o F with PMH Atrial fibrillation on Xarelto Lung Caner, Depression, arthritis, chronic alcohol abuse presents to the ED complaining of Depression. Patient states that  77 y/o F with PMH Atrial fibrillation on Xarelto Lung Caner, Depression, arthritis, chronic alcohol abuse presents to the ED complaining of Depression. Patient states that she has been depressed since March 2020. Patient states that seeing alberto many people die from COVID has made her depressed. Patient states that since then she has began drinking more. Patient states that she drink 5-6 shots of vodka and 1.5 bottles of wine a day. Patient states she doesn't remember how much vodka she drank today. Per ED note she stated 7-8. Patient states that earlier today she call 311 seeking a psychiatrist. She reports that the  called 911 because she states that she wanted to die. Patient denies having a plan to kill herself and denies homicidal ideation, auditory or visual hallucinations. Patient denies fever and chills but endorses epigastric discomfort. 77 y/o F with PMH Atrial fibrillation on Xarelto(CHADVASc score of 3) Lung Caner, Depression, arthritis, chronic alcohol abuse presents to the ED complaining of Depression. Patient states that she has been depressed since March 2020. Patient states that seeing alberto many people die from COVID has made her depressed. Patient states that since then she has began drinking more. Patient states that she drink 5-6 shots of vodka and 1.5 bottles of wine a day. Patient states she doesn't remember how much vodka she drank today. Per ED note she stated 7-8. Patient states that earlier today she call 311 seeking a psychiatrist. She reports that the  called 911 because she states that she wanted to die. Patient reports If you feel unhappy, depressed, anxious, fearful, isabel, or in need of emotional help, call your PMD, Psychiatrist or 911.  Follow Psych recommendations, continue current medications as prescribed, Patient reports If you feel unhappy, depressed, anxious, fearful, isabel, or in need of emotional help, call your PMD, Psychiatrist or 911.  Follow Psych recommendations, continue current medications as prescribed, attempt years ago by slitting wrist. Patient denies having a plan to kill herself and denies homicidal ideation, auditory or visual hallucinations. Patient denies fever and chills but endorses epigastric discomfort. Patient states is on digoxin, metoprolol and zoloft at home. 75 y/o F with PMH Atrial fibrillation on Xarelto(CHADVASc score of 3) Lung Caner, Depression, arthritis, chronic alcohol abuse presents to the ED complaining of Depression. Patient states that she has been depressed since March 2020. Patient states that seeing alberto many people die from COVID has made her depressed. Patient states that since then she has began drinking more. Patient states that she drink 5-6 shots of vodka and 1.5 bottles of wine a day. Patient states she doesn't remember how much vodka she drank today. Per ED note she stated 7-8. Patient states that earlier today she call 311 seeking a psychiatrist. She reports that the  called 911 because she states that she wanted to die. Patient reports If you feel unhappy, depressed, anxious, fearful, isabel, or in need of emotional help, call your PMD, Psychiatrist or 911.  Follow Psych recommendations, continue current medications as prescribed, Patient reports If you feel unhappy, depressed, anxious, fearful, isabel, or in need of emotional help, call your PMD, Psychiatrist or 911.  Follow Psych recommendations, continue current medications as prescribed, attempt years ago by slitting wrist. Patient denies having a plan to kill herself and denies homicidal ideation, auditory or visual hallucinations. Patient denies fever and chills but endorses epigastric discomfort. Patient states is on digoxin, metoprolol, zoloft and oxycodone for osteoarthritis at home. 77 y/o F with PMH Atrial fibrillation on Xarelto(CHADVASc score of 3) Lung Caner, Depression, arthritis, chronic alcohol abuse presents to the ED complaining of Depression. Patient states that she has been depressed since March 2020. Patient states that seeing alberto many people die from COVID has made her depressed. Patient states that since then she has began drinking more. Patient states that she drink 5-6 shots of vodka and 1.5 bottles of wine a day. Patient states she doesn't remember how much vodka she drank today. Per ED note she stated 7-8. Patient states that earlier today she call 311 seeking a psychiatrist. She reports that the  called 911 because she states that she wanted to die. Patient reports Patient denies having a plan to kill herself and denies homicidal ideation, auditory or visual hallucinations. Patient denies fever and chills but endorses epigastric discomfort. Patient states is on digoxin, metoprolol, zoloft and oxycodone for osteoarthritis at home. 77 y/o F with PMH Atrial fibrillation on Xarelto(CHADVASc score of 3) Lung Caner, Depression, arthritis, chronic alcohol abuse presents to the ED complaining of Depression. Patient states that she has been depressed since March 2020. Patient states that seeing alberto many people die from COVID has made her depressed. Patient states that since then she has began drinking more. Patient states that she drink 5-6 shots of vodka and 1.5 bottles of wine a day. Patient states she doesn't remember how much vodka she drank today. Per ED note she stated 7-8. Patient states that earlier today she call 311 seeking a psychiatrist. She reports that the  called 911 because she states that she wanted to die. Patient reports Patient denies having a plan to kill herself and denies homicidal ideation, auditory or visual hallucinations. Patient denies fever and chills but endorses epigastric discomfort. Patient states is on digoxin, metoprolol, zoloft and oxycodone for osteoarthritis at home. Patient endorses suprapubic pressure but denies dysuria.

## 2021-08-17 NOTE — PHARMACOTHERAPY INTERVENTION NOTE - COMMENTS
Medication history verified with patient's outpatient pharmacy, [facility].   Per [facility]- previously in early April 2021 filled Pantoprazole 40mg once daily and Digoxin 125mcg once daily, however, has not been filled since

## 2021-08-17 NOTE — H&P ADULT - NSHPLABSRESULTS_GEN_ALL_CORE
08-16    145  |  104  |  6<L>  ----------------------------<  93  4.1   |  22  |  0.44<L>    Ca    9.4      16 Aug 2021 19:09    TPro  7.4  /  Alb  4.7  /  TBili  0.3  /  DBili  x   /  AST  34<H>  /  ALT  16  /  AlkPhos  72  08-16    Urine toxicology positive for Benzodiazepines, Oxycodone.  Blood alcohol: 225    COVID 19 PCR: Negative.    EKG:

## 2021-08-17 NOTE — BH CONSULTATION LIAISON ASSESSMENT NOTE - RISK ASSESSMENT
Risk factors:  Protective factors:  Risk factors: Acute medical issues, pain, substance abuse, depression w/ SI, past admissions for SA  Protective factors: Supportive , residential stability, goal/future-oriented, wants to detox, denies plan/intent to end life

## 2021-08-17 NOTE — H&P ADULT - NSHPSOCIALHISTORY_GEN_ALL_CORE
Patient lives with . Patient denies use of cigarettes or drugs. Patient endorses drinking 1.5 bottles of wine a day and endorses drinking 5-6 shots of vodka daily.

## 2021-08-17 NOTE — H&P ADULT - PROBLEM SELECTOR PLAN 4
Continue monitoring. Patient reports feeling depressed for more than a year.  Psych consult called. Patient endorses suprapubic pressure but denies dysuria.  UA ordered.

## 2021-08-17 NOTE — BH CONSULTATION LIAISON ASSESSMENT NOTE - SUMMARY
PLAN:  - Routine observation   - Continue with CIWA protocol and Ativan taper as ordered for now and psychiatry to reassess patient tomorrow if adjustments are necessary   - Thiamine 500mg IV TID x 3 days (Ordered) then switch to 100mg PO QD  - Obtain EKG for QTc  - Continue folic acid and vitamin B12 (Level in 200s)  - Re-start patient on home Trazodone at lower dose of 50mg PO qHS PRN for sleep (Ordered)  - C/w home Zoloft 50mg PO QD  - Patient also taking Percocet 5/325mg BID for OA pain will defer to primary team  - Do not re-start home Xanax at this time as patient on Ativan taper  - Ativan 1mg PO/IV/IM q 6 hours PRN for any agitation   - Monitor vital signs closely for any changes that may indicate worsening withdrawal (HTN/Tachycardia)  - May provide patient w/ the following outpatient referrals upon DC: [facility] outpatient Geriatric clinic [phone]; [facility] Substance clinic ([facility]) [phone] [address] [place] [address] 76F , domiciled w/ , 2 children, homemaker w/ PMH of Lung CA, arthritis, and AFib w/ PPHx of depression, anxiety, chronic alcohol abuse, BZD/Opioid use w/ 1 prior psychiatric admission 20 years ago for suicide attempt via cutting wrist and multiple attempts at detox via rehab presents to the ED complaining of depression w/ SI and EtOH withdrawal. Psychiatry consulted for SI/EtOH management.     Patient seen AOx4, anxious, full affect, linear, endorsing depression and passive suicidality. Since COVID has been more depressed/anxious and not wanting to leave the house, therefore, has been drinking more often about 5-6 shots of vodka and 1-2 bottles of wine per day and has attempted multiple times to detox at different rehab facilities in the last couple of years. Reports 1 SA over 20 years ago during menopause and has not attempted since. She reports being more often than not compliant w/ medications prescribed by PCP - Unsuccessful attempts in seeing a psychiatrist. Patient taking Xanax 2-3 times per day for "panic attacks" and also Percocet BID for OA pain. Reports decreased appetite and adequate sleep also taking Trazodone. Currently patient still passively suicidal not wanting to live in such an overwhelming environment, however, she has no plan/intent. Also denies HI/AH/VH and remains future-oriented and goal-oriented to return home to sit outside and enjoy her yard and .    CIWA scores 4 currently; VSS stable; Folate/TSH WNL; B12 low; Tox screen + BZD and opiates; EtOH level 225    PLAN:  - Routine observation   - Continue with CIWA protocol and Ativan taper as ordered for now and psychiatry to reassess patient tomorrow if adjustments are necessary   - Thiamine 500mg IV TID x 3 days (Ordered) then switch to 100mg PO QD  - Obtain EKG for QTc  - Continue folic acid and vitamin B12 (Level in 200s)  - Re-start patient on home Trazodone at lower dose of 50mg PO qHS PRN for sleep (Ordered)  - C/w home Zoloft 50mg PO QD  - Patient also taking Percocet 5/325mg BID for OA pain will defer to primary team  - Do not re-start home Xanax at this time as patient on Ativan taper  - Ativan 1mg PO/IV/IM q 6 hours PRN for any agitation   - Monitor vital signs closely for any changes that may indicate worsening withdrawal (HTN/Tachycardia)  - May provide patient w/ the following outpatient referrals upon DC: [facility] outpatient Geriatric clinic [phone]; [facility] Substance clinic ([facility]) [phone] [address] [place] [address]

## 2021-08-17 NOTE — BH CONSULTATION LIAISON ASSESSMENT NOTE - CASE SUMMARY
Met with the patient on 8/18. Discussed case with RAHEEM Singh impression and plan discussed and agreed upon. Agree with plan as above

## 2021-08-17 NOTE — PROGRESS NOTE ADULT - ASSESSMENT
76F with hx of Afib on Xarelto, Lung Cancer, Depression, EtOH use, who presents complaining of passive suicidal ideation, admitted with for EtOH withdrawal and psychiatry evaluation.  76F with hx of Afib on Xarelto, Lung Cancer, Depression, EtOH use, who presents complaining of passive suicidal ideation, admitted for EtOH withdrawal and psychiatry evaluation.

## 2021-08-17 NOTE — PROGRESS NOTE ADULT - PROBLEM SELECTOR PLAN 5
DVT ppx: therapeutically anticoagulated on xarelto  DIET: DASH/TLC  DISPO: Pending improvement of EtOH withdrawal, ativan taper, psych eval.     Discussed with patient and ACP Balwinder. DVT ppx: therapeutically anticoagulated on Xarelto  DIET: DASH/TLC  DISPO: Pending improvement of EtOH withdrawal, ativan taper, psych eval.     Discussed with patient and ACP Balwinder.

## 2021-08-17 NOTE — PROGRESS NOTE ADULT - PROBLEM SELECTOR PLAN 2
On admission endorsed passive SI saying "I just want to die"  - currently denies SI/HI  - psychiatry consulted, f/u recommendations  - c/w home Trazadone 100mg qhs and Sertraline 50mg daily   - hold home alprazolam 1mg TID (ISTOP [id number]) as patient on ativan taper as above On admission endorsed passive SI saying "I just want to die"  - currently denies SI/HI  - psychiatry consulted, f/u recommendations  - Trazadone reduced to 50mg qhs PRN per psych, c/w Sertraline 50mg daily   - hold home alprazolam 1mg TID (ISTOP [id number]) as patient on ativan taper as above

## 2021-08-17 NOTE — H&P ADULT - PROBLEM SELECTOR PLAN 3
CHADVASc score of 3.  Patient reports that she is on digoxin and Metroprolol at home. Patient unsure of dose.  Will email [facility] rec pharmacist.  Per [facility] patient was prescribed Digoxin 125 mcg.  Continue Xarelto 20 mg daily.

## 2021-08-17 NOTE — H&P ADULT - PROBLEM SELECTOR PLAN 6
Continue Metoprolol once dose is confirmed.  DASH/TLC diet ordered. Med rec pharmacist emailed regarding medication.

## 2021-08-17 NOTE — BH CONSULTATION LIAISON ASSESSMENT NOTE - CURRENT MEDICATION
MEDICATIONS  (STANDING):  cyanocobalamin 1000 MICROGram(s) Oral daily  folic acid 1 milliGRAM(s) Oral daily  LORazepam     Tablet 2 milliGRAM(s) Oral every 4 hours  LORazepam     Tablet   Oral   LORazepam     Tablet 1.5 milliGRAM(s) Oral every 4 hours  metoprolol succinate  milliGRAM(s) Oral daily  rivaroxaban 20 milliGRAM(s) Oral with dinner  sertraline 50 milliGRAM(s) Oral daily  sodium chloride 0.9% lock flush 3 milliLiter(s) IV Push every 8 hours  thiamine 100 milliGRAM(s) Oral daily  traZODone 100 milliGRAM(s) Oral at bedtime    MEDICATIONS  (PRN):  LORazepam   Injectable 2 milliGRAM(s) IV Push every 2 hours PRN Symptom-triggered: 2 point increase in CIWA -Ar score and a total score of 7 or LESS  LORazepam   Injectable 2 milliGRAM(s) IV Push every 1 hour PRN Symptom-triggered: each CIWA -Ar score 8 or GREATER

## 2021-08-17 NOTE — PROGRESS NOTE ADULT - SUBJECTIVE AND OBJECTIVE BOX
Frances Faye MD  [facility] Division of Hospital Medicine  Pager [phone] (M-F 8AM-5PM)  Other Times: [phone]    Patient is a 76y old  Female who presents with a chief complaint of Alcohol Withdrawal (17 Aug 2021 03:10)    SUBJECTIVE / OVERNIGHT EVENTS: Patient seen and examined at bedside. Feels anxious, denies tremors, says had passive SI thoughts before, but currently denies SI/HI.     MEDICATIONS  (STANDING):  cyanocobalamin 1000 MICROGram(s) Oral daily  folic acid 1 milliGRAM(s) Oral daily  LORazepam     Tablet 2 milliGRAM(s) Oral every 4 hours  LORazepam     Tablet   Oral   LORazepam     Tablet 1.5 milliGRAM(s) Oral every 4 hours  metoprolol succinate  milliGRAM(s) Oral daily  rivaroxaban 20 milliGRAM(s) Oral with dinner  sertraline 50 milliGRAM(s) Oral daily  sodium chloride 0.9% lock flush 3 milliLiter(s) IV Push every 8 hours  thiamine 100 milliGRAM(s) Oral daily  traZODone 100 milliGRAM(s) Oral at bedtime    MEDICATIONS  (PRN):  LORazepam   Injectable 2 milliGRAM(s) IV Push every 2 hours PRN Symptom-triggered: 2 point increase in CIWA -Ar score and a total score of 7 or LESS  LORazepam   Injectable 2 milliGRAM(s) IV Push every 1 hour PRN Symptom-triggered: each CIWA -Ar score 8 or GREATER      PHYSICAL EXAM:  Vital Signs Last 24 Hrs  T(C): 37.3 (17 Aug 2021 12:15), Max: 37.3 (17 Aug 2021 12:15)  T(F): 99.2 (17 Aug 2021 12:15), Max: 99.2 (17 Aug 2021 12:15)  HR: 105 (17 Aug 2021 12:15) (97 - 107)  BP: 150/94 (17 Aug 2021 12:15) (125/66 - 163/110)  RR: 17 (17 Aug 2021 12:15) (16 - 18)  SpO2: 100% (17 Aug 2021 12:15) (97% - 100%)    CONSTITUTIONAL: NAD, well-developed, well-groomed  RESPIRATORY: Normal respiratory effort; lungs are clear to auscultation bilaterally  CARDIOVASCULAR: Regular rate and rhythm, normal S1 and S2, no murmur/rub/gallop; No lower extremity edema  GASTROINTESTINAL: Nontender to palpation, normoactive bowel sounds, no rebound/guarding; No hepatosplenomegaly  MUSCULOSKELETAL:  no clubbing or cyanosis of digits; no joint swelling or tenderness to palpation  NEUROLOGY: non-focal; no gross sensory deficits   PSYCH: A+O to person, place, and time; affect appropriate  SKIN: No rashes; warm     LABS:                        11.3   4.54  )-----------( 166      ( 17 Aug 2021 05:22 )             34.1     -    141  |  102  |  6<L>  ----------------------------<  87  3.5   |  24  |  0.41<L>    Ca    8.5      17 Aug 2021 05:22  Phos  2.5       Mg     1.40         TPro  7.4  /  Alb  4.7  /  TBili  0.3  /  DBili  x   /  AST  34<H>  /  ALT  16  /  AlkPhos  72  08-16          Urinalysis Basic - ( 17 Aug 2021 11:44 )    Color: Yellow / Appearance: Clear / S.023 / pH: x  Gluc: x / Ketone: Small  / Bili: Negative / Urobili: <2 mg/dL   Blood: x / Protein: 30 mg/dL / Nitrite: Negative   Leuk Esterase: Small / RBC: 12 /HPF / WBC 9 /HPF   Sq Epi: x / Non Sq Epi: 3 /HPF / Bacteria: Negative          RADIOLOGY & ADDITIONAL TESTS:  Results Reviewed:   Imaging Personally Reviewed:  Electrocardiogram Personally Reviewed:    COORDINATION OF CARE:  Care Discussed with Consultants/Other Providers [Y/N]:  Prior or Outpatient Records Reviewed [Y/N]:   Frances Faye MD  [facility] Division of Hospital Medicine  Pager [phone] (M-F 8AM-5PM)  Other Times: [phone]    Patient is a 76y old  Female who presents with a chief complaint of Alcohol Withdrawal (17 Aug 2021 03:10)    SUBJECTIVE / OVERNIGHT EVENTS: Patient seen and examined at bedside. Feels anxious, denies tremors, says had passive SI thoughts before, but currently denies SI/HI.     MEDICATIONS  (STANDING):  cyanocobalamin 1000 MICROGram(s) Oral daily  folic acid 1 milliGRAM(s) Oral daily  LORazepam     Tablet 2 milliGRAM(s) Oral every 4 hours  LORazepam     Tablet   Oral   LORazepam     Tablet 1.5 milliGRAM(s) Oral every 4 hours  metoprolol succinate  milliGRAM(s) Oral daily  rivaroxaban 20 milliGRAM(s) Oral with dinner  sertraline 50 milliGRAM(s) Oral daily  sodium chloride 0.9% lock flush 3 milliLiter(s) IV Push every 8 hours  thiamine 100 milliGRAM(s) Oral daily  traZODone 100 milliGRAM(s) Oral at bedtime    MEDICATIONS  (PRN):  LORazepam   Injectable 2 milliGRAM(s) IV Push every 2 hours PRN Symptom-triggered: 2 point increase in CIWA -Ar score and a total score of 7 or LESS  LORazepam   Injectable 2 milliGRAM(s) IV Push every 1 hour PRN Symptom-triggered: each CIWA -Ar score 8 or GREATER    PHYSICAL EXAM:  Vital Signs Last 24 Hrs  T(C): 37.3 (17 Aug 2021 12:15), Max: 37.3 (17 Aug 2021 12:15)  T(F): 99.2 (17 Aug 2021 12:15), Max: 99.2 (17 Aug 2021 12:15)  HR: 105 (17 Aug 2021 12:15) (97 - 107)  BP: 150/94 (17 Aug 2021 12:15) (125/66 - 163/110)  RR: 17 (17 Aug 2021 12:15) (16 - 18)  SpO2: 100% (17 Aug 2021 12:15) (97% - 100%)    CONSTITUTIONAL: NAD, well-developed, well-groomed  RESPIRATORY: Normal respiratory effort; lungs are clear to auscultation bilaterally  CARDIOVASCULAR: Regular rate and rhythm, normal S1 and S2, no murmur/rub/gallop; No lower extremity edema  GASTROINTESTINAL: Nontender to palpation, normoactive bowel sounds, no rebound/guarding; No hepatosplenomegaly  MUSCULOSKELETAL:  no clubbing or cyanosis of digits; no joint swelling or tenderness to palpation  NEUROLOGY: non-focal; no gross sensory deficits, chronic right eye droop (Vale's syndrome)  PSYCH: A+O to person, place, and time; affect appropriate  SKIN: No rashes; warm     LABS:                        11.3   4.54  )-----------( 166      ( 17 Aug 2021 05:22 )             34.1     08-    141  |  102  |  6<L>  ----------------------------<  87  3.5   |  24  |  0.41<L>    Ca    8.5      17 Aug 2021 05:22  Phos  2.5     -  Mg     1.40         TPro  7.4  /  Alb  4.7  /  TBili  0.3  /  DBili  x   /  AST  34<H>  /  ALT  16  /  AlkPhos  72  08-16          Urinalysis Basic - ( 17 Aug 2021 11:44 )    Color: Yellow / Appearance: Clear / S.023 / pH: x  Gluc: x / Ketone: Small  / Bili: Negative / Urobili: <2 mg/dL   Blood: x / Protein: 30 mg/dL / Nitrite: Negative   Leuk Esterase: Small / RBC: 12 /HPF / WBC 9 /HPF   Sq Epi: x / Non Sq Epi: 3 /HPF / Bacteria: Negative          RADIOLOGY & ADDITIONAL TESTS:  Results Reviewed:   Imaging Personally Reviewed:  Electrocardiogram Personally Reviewed:    COORDINATION OF CARE:  Care Discussed with Consultants/Other Providers [Y/N]:  Prior or Outpatient Records Reviewed [Y/N]:

## 2021-08-17 NOTE — BH CONSULTATION LIAISON ASSESSMENT NOTE - NSBHCHARTREVIEWLAB_PSY_A_CORE FT
11.3   4.54  )-----------( 166      ( 17 Aug 2021 05:22 )             34.1   08-17    141  |  102  |  6<L>  ----------------------------<  87  3.5   |  24  |  0.41<L>    Ca    8.5      17 Aug 2021 05:22  Phos  2.5     08-17  Mg     1.40     08-17    TPro  7.4  /  Alb  4.7  /  TBili  0.3  /  DBili  x   /  AST  34<H>  /  ALT  16  /  AlkPhos  72  08-16

## 2021-08-17 NOTE — H&P ADULT - PROBLEM SELECTOR PLAN 5
Patient reports feeling depressed for more than a year.  Psych consult called. Patient endorses suprapubic pressure but denies dysuria.  UA ordered. Continue Metoprolol once dose is confirmed.  DASH/TLC diet ordered.

## 2021-08-17 NOTE — ED ADULT NURSE REASSESSMENT NOTE - NS ED NURSE REASSESS COMMENT FT1
RAHEEM Dempsey notified of pt ciwa increase from 7 to 9. Notified and ware of vitals as well. No orders to be placed for vitals. Respirations even and unlabored, no accessory muscle use. Pt denies cp, sob, abd pain, dizziness, n/v/d at this time. Pt c/o of ha, and anxiety. pt has tremors to arms.

## 2021-08-17 NOTE — H&P ADULT - ASSESSMENT
75 y/o F with PMH Atrial fibrillation on Xarelto(CHADVASc score of 3) Lung Caner, Depression, arthritis, chronic alcohol abuse presents to the ED complaining of Depression. Admitted Alcohol withdrawal, Depression and Suicidal ideation.

## 2021-08-18 DIAGNOSIS — F41.9 ANXIETY DISORDER, UNSPECIFIED: ICD-10-CM

## 2021-08-18 DIAGNOSIS — R19.5 OTHER FECAL ABNORMALITIES: ICD-10-CM

## 2021-08-18 DIAGNOSIS — F32.9 MAJOR DEPRESSIVE DISORDER, SINGLE EPISODE, UNSPECIFIED: ICD-10-CM

## 2021-08-18 LAB
ANION GAP SERPL CALC-SCNC: 14 MMOL/L — SIGNIFICANT CHANGE UP (ref 7–14)
BUN SERPL-MCNC: 5 MG/DL — LOW (ref 7–23)
CALCIUM SERPL-MCNC: 9.2 MG/DL — SIGNIFICANT CHANGE UP (ref 8.4–10.5)
CHLORIDE SERPL-SCNC: 104 MMOL/L — SIGNIFICANT CHANGE UP (ref 98–107)
CO2 SERPL-SCNC: 22 MMOL/L — SIGNIFICANT CHANGE UP (ref 22–31)
CREAT SERPL-MCNC: 0.5 MG/DL — SIGNIFICANT CHANGE UP (ref 0.5–1.3)
CULTURE RESULTS: SIGNIFICANT CHANGE UP
DIGOXIN SERPL-MCNC: <0.3 NG/ML — LOW (ref 0.8–2)
GLUCOSE SERPL-MCNC: 93 MG/DL — SIGNIFICANT CHANGE UP (ref 70–99)
HCT VFR BLD CALC: 40.3 % — SIGNIFICANT CHANGE UP (ref 34.5–45)
HGB BLD-MCNC: 13.2 G/DL — SIGNIFICANT CHANGE UP (ref 11.5–15.5)
MAGNESIUM SERPL-MCNC: 1.8 MG/DL — SIGNIFICANT CHANGE UP (ref 1.6–2.6)
MCHC RBC-ENTMCNC: 32.8 GM/DL — SIGNIFICANT CHANGE UP (ref 32–36)
MCHC RBC-ENTMCNC: 33.5 PG — SIGNIFICANT CHANGE UP (ref 27–34)
MCV RBC AUTO: 102.3 FL — HIGH (ref 80–100)
NRBC # BLD: 0 /100 WBCS — SIGNIFICANT CHANGE UP
NRBC # FLD: 0 K/UL — SIGNIFICANT CHANGE UP
PHOSPHATE SERPL-MCNC: 2.4 MG/DL — LOW (ref 2.5–4.5)
PLATELET # BLD AUTO: 127 K/UL — LOW (ref 150–400)
POTASSIUM SERPL-MCNC: 3.4 MMOL/L — LOW (ref 3.5–5.3)
POTASSIUM SERPL-SCNC: 3.4 MMOL/L — LOW (ref 3.5–5.3)
RBC # BLD: 3.94 M/UL — SIGNIFICANT CHANGE UP (ref 3.8–5.2)
RBC # FLD: 12.7 % — SIGNIFICANT CHANGE UP (ref 10.3–14.5)
SODIUM SERPL-SCNC: 140 MMOL/L — SIGNIFICANT CHANGE UP (ref 135–145)
SPECIMEN SOURCE: SIGNIFICANT CHANGE UP
WBC # BLD: 4.76 K/UL — SIGNIFICANT CHANGE UP (ref 3.8–10.5)
WBC # FLD AUTO: 4.76 K/UL — SIGNIFICANT CHANGE UP (ref 3.8–10.5)

## 2021-08-18 PROCEDURE — 99233 SBSQ HOSP IP/OBS HIGH 50: CPT

## 2021-08-18 RX ORDER — SODIUM,POTASSIUM PHOSPHATES 278-250MG
1 POWDER IN PACKET (EA) ORAL
Refills: 0 | Status: COMPLETED | OUTPATIENT
Start: 2021-08-18 | End: 2021-08-19

## 2021-08-18 RX ORDER — POTASSIUM CHLORIDE 20 MEQ
40 PACKET (EA) ORAL EVERY 4 HOURS
Refills: 0 | Status: COMPLETED | OUTPATIENT
Start: 2021-08-18 | End: 2021-08-18

## 2021-08-18 RX ADMIN — SODIUM CHLORIDE 3 MILLILITER(S): 9 INJECTION INTRAMUSCULAR; INTRAVENOUS; SUBCUTANEOUS at 21:05

## 2021-08-18 RX ADMIN — PREGABALIN 1000 MICROGRAM(S): 225 CAPSULE ORAL at 12:17

## 2021-08-18 RX ADMIN — Medication 1.5 MILLIGRAM(S): at 18:04

## 2021-08-18 RX ADMIN — SODIUM CHLORIDE 3 MILLILITER(S): 9 INJECTION INTRAMUSCULAR; INTRAVENOUS; SUBCUTANEOUS at 06:20

## 2021-08-18 RX ADMIN — Medication 1 MILLIGRAM(S): at 21:08

## 2021-08-18 RX ADMIN — RIVAROXABAN 20 MILLIGRAM(S): KIT at 18:04

## 2021-08-18 RX ADMIN — Medication 1.5 MILLIGRAM(S): at 10:58

## 2021-08-18 RX ADMIN — Medication 105 MILLIGRAM(S): at 15:02

## 2021-08-18 RX ADMIN — Medication 1 MILLIGRAM(S): at 12:14

## 2021-08-18 RX ADMIN — Medication 1 PACKET(S): at 12:17

## 2021-08-18 RX ADMIN — Medication 105 MILLIGRAM(S): at 06:21

## 2021-08-18 RX ADMIN — Medication 105 MILLIGRAM(S): at 21:08

## 2021-08-18 RX ADMIN — Medication 125 MICROGRAM(S): at 06:09

## 2021-08-18 RX ADMIN — Medication 1.5 MILLIGRAM(S): at 15:02

## 2021-08-18 RX ADMIN — SERTRALINE 50 MILLIGRAM(S): 25 TABLET, FILM COATED ORAL at 12:14

## 2021-08-18 RX ADMIN — SODIUM CHLORIDE 3 MILLILITER(S): 9 INJECTION INTRAMUSCULAR; INTRAVENOUS; SUBCUTANEOUS at 14:24

## 2021-08-18 RX ADMIN — Medication 100 MILLIGRAM(S): at 06:09

## 2021-08-18 RX ADMIN — Medication 40 MILLIEQUIVALENT(S): at 10:58

## 2021-08-18 RX ADMIN — Medication 50 MILLIGRAM(S): at 21:07

## 2021-08-18 RX ADMIN — Medication 1.5 MILLIGRAM(S): at 06:08

## 2021-08-18 RX ADMIN — Medication 1.5 MILLIGRAM(S): at 02:13

## 2021-08-18 NOTE — PHYSICAL THERAPY INITIAL EVALUATION ADULT - DISCHARGE DISPOSITION, PT EVAL
discharge home; no skilled PT needs. Will continue to follow while at [facility].

## 2021-08-18 NOTE — PROGRESS NOTE ADULT - PROBLEM SELECTOR PLAN 2
On admission endorsed passive SI saying "I just want to die"  - currently denies SI/HI  - appreciate psychiatry recommendations - trazadone reduced to 50mg qhs PRN, c/w Sertraline 50mg daily, outpatient followup at [facility]  - hold home alprazolam 1mg TID (ISTOP [id number]) as patient on ativan taper as above

## 2021-08-18 NOTE — PHYSICAL THERAPY INITIAL EVALUATION ADULT - PERTINENT HX OF CURRENT PROBLEM, REHAB EVAL
Pt is a 76 year old female presenting with depression. Pt reported seeing many people from COVID die - impacted mood. Admitted alcohol withdrawal, depression and suicidal ideation. PMH: Atrial fibrillation on Xarelto (CHADVASc score of 3) lung caner, depression, arthritis, chronic alcohol abuse.

## 2021-08-18 NOTE — PROGRESS NOTE ADULT - SUBJECTIVE AND OBJECTIVE BOX
Frances Faye MD  [facility] Division of Hospital Medicine  Pager [phone] (M-F 8AM-5PM)  Other Times: [phone]    Patient is a 76y old  Female who presents with a chief complaint of Alcohol Withdrawal (17 Aug 2021 12:55)    SUBJECTIVE / OVERNIGHT EVENTS: Patient seen and examined at bedside. Feels less anxious than before. Having some soft BMs.     MEDICATIONS  (STANDING):  cyanocobalamin 1000 MICROGram(s) Oral daily  digoxin     Tablet 125 MICROGram(s) Oral daily  folic acid 1 milliGRAM(s) Oral daily  LORazepam     Tablet   Oral   LORazepam     Tablet 1.5 milliGRAM(s) Oral every 4 hours  LORazepam     Tablet 1 milliGRAM(s) Oral every 4 hours  metoprolol succinate  milliGRAM(s) Oral daily  potassium chloride   Powder 40 milliEquivalent(s) Oral every 4 hours  potassium phosphate / sodium phosphate Powder (PHOS-NaK) 1 Packet(s) Oral three times a day with meals  rivaroxaban 20 milliGRAM(s) Oral with dinner  sertraline 50 milliGRAM(s) Oral daily  sodium chloride 0.9% lock flush 3 milliLiter(s) IV Push every 8 hours  thiamine IVPB 500 milliGRAM(s) IV Intermittent three times a day    MEDICATIONS  (PRN):  LORazepam   Injectable 2 milliGRAM(s) IV Push every 2 hours PRN Symptom-triggered: 2 point increase in CIWA -Ar score and a total score of 7 or LESS  LORazepam   Injectable 2 milliGRAM(s) IV Push every 1 hour PRN Symptom-triggered: each CIWA -Ar score 8 or GREATER  traZODone 50 milliGRAM(s) Oral at bedtime PRN Sleep      PHYSICAL EXAM:  Vital Signs Last 24 Hrs  T(C): 36.9 (18 Aug 2021 10:00), Max: 37.3 (17 Aug 2021 12:15)  T(F): 98.5 (18 Aug 2021 10:00), Max: 99.2 (17 Aug 2021 12:15)  HR: 94 (18 Aug 2021 10:00) (81 - 105)  BP: 136/90 (18 Aug 2021 10:00) (133/80 - 160/87)  RR: 18 (18 Aug 2021 10:00) (16 - 18)  SpO2: 99% (18 Aug 2021 10:00) (97% - 100%)    CONSTITUTIONAL: NAD, well-developed, well-groomed  RESPIRATORY: Normal respiratory effort; lungs are clear to auscultation bilaterally  CARDIOVASCULAR: Regular rate and rhythm, normal S1 and S2, no murmur/rub/gallop; No lower extremity edema  GASTROINTESTINAL: Nontender to palpation, normoactive bowel sounds, no rebound/guarding; No hepatosplenomegaly  MUSCULOSKELETAL:  no clubbing or cyanosis of digits; no joint swelling or tenderness to palpation  NEUROLOGY: non-focal; no gross sensory deficits   PSYCH: A+O to person, place, and time; affect appropriate  SKIN: No rashes; warm     LABS:                        13.2   4.76  )-----------( 127      ( 18 Aug 2021 07:48 )             40.3     08-18    140  |  104  |  5<L>  ----------------------------<  93  3.4<L>   |  22  |  0.50    Ca    9.2      18 Aug 2021 07:48  Phos  2.4     08-18  Mg     1.80     08-18    TPro  7.4  /  Alb  4.7  /  TBili  0.3  /  DBili  x   /  AST  34<H>  /  ALT  16  /  AlkPhos  72  08-16          Urinalysis Basic - ( 17 Aug 2021 11:44 )    Color: Yellow / Appearance: Clear / S.023 / pH: x  Gluc: x / Ketone: Small  / Bili: Negative / Urobili: <2 mg/dL   Blood: x / Protein: 30 mg/dL / Nitrite: Negative   Leuk Esterase: Small / RBC: 12 /HPF / WBC 9 /HPF   Sq Epi: x / Non Sq Epi: 3 /HPF / Bacteria: Negative        Culture - Urine (collected 16 Aug 2021 21:27)  Source: Clean Catch Clean Catch (Midstream)  Final Report (17 Aug 2021 21:51):    <10,000 CFU/mL Normal Urogenital Mily        RADIOLOGY & ADDITIONAL TESTS:  Results Reviewed:   Imaging Personally Reviewed:  Electrocardiogram Personally Reviewed:    COORDINATION OF CARE:  Care Discussed with Consultants/Other Providers [Y/N]:  Prior or Outpatient Records Reviewed [Y/N]:

## 2021-08-18 NOTE — PROGRESS NOTE ADULT - PROBLEM SELECTOR PLAN 3
Patient with 2-3 loose BMs   - no recent abx use or hospitalizations  - takes imodium PRN at home   - low suspicion for c.diff, will check GI PCR if negative can start imodium PRN  - monitor stool count

## 2021-08-18 NOTE — PHYSICAL THERAPY INITIAL EVALUATION ADULT - PATIENT PROFILE REVIEW, REHAB EVAL
PT orders received: ambulate with assistance. Consult with RN Promise, patient may participate in PT evaluation./yes

## 2021-08-18 NOTE — PROGRESS NOTE ADULT - ASSESSMENT
76F with hx of Afib on Xarelto, Lung Cancer, Depression, EtOH use, who presents complaining of passive suicidal ideation, admitted for EtOH withdrawal and psychiatry evaluation.

## 2021-08-18 NOTE — PHYSICAL THERAPY INITIAL EVALUATION ADULT - ADDITIONAL COMMENTS
Pt lives in a house with + flight of stairs to negotiate (handrail). Pt reports ambulating mostly independently prior to admission, however walks slowly and can only stand for ~15 minutes at a time.     Pt was left semisupine with head of bed elevated, all lines intact and call bell within reach, RN aware.

## 2021-08-19 LAB
ANION GAP SERPL CALC-SCNC: 12 MMOL/L — SIGNIFICANT CHANGE UP (ref 7–14)
BUN SERPL-MCNC: 6 MG/DL — LOW (ref 7–23)
CALCIUM SERPL-MCNC: 9.2 MG/DL — SIGNIFICANT CHANGE UP (ref 8.4–10.5)
CHLORIDE SERPL-SCNC: 107 MMOL/L — SIGNIFICANT CHANGE UP (ref 98–107)
CO2 SERPL-SCNC: 23 MMOL/L — SIGNIFICANT CHANGE UP (ref 22–31)
CREAT SERPL-MCNC: 0.47 MG/DL — LOW (ref 0.5–1.3)
GLUCOSE SERPL-MCNC: 95 MG/DL — SIGNIFICANT CHANGE UP (ref 70–99)
HCT VFR BLD CALC: 37 % — SIGNIFICANT CHANGE UP (ref 34.5–45)
HGB BLD-MCNC: 12.3 G/DL — SIGNIFICANT CHANGE UP (ref 11.5–15.5)
MAGNESIUM SERPL-MCNC: 1.6 MG/DL — SIGNIFICANT CHANGE UP (ref 1.6–2.6)
MCHC RBC-ENTMCNC: 33.2 GM/DL — SIGNIFICANT CHANGE UP (ref 32–36)
MCHC RBC-ENTMCNC: 33.9 PG — SIGNIFICANT CHANGE UP (ref 27–34)
MCV RBC AUTO: 101.9 FL — HIGH (ref 80–100)
NRBC # BLD: 0 /100 WBCS — SIGNIFICANT CHANGE UP
NRBC # FLD: 0 K/UL — SIGNIFICANT CHANGE UP
PHOSPHATE SERPL-MCNC: 3.3 MG/DL — SIGNIFICANT CHANGE UP (ref 2.5–4.5)
PLATELET # BLD AUTO: 158 K/UL — SIGNIFICANT CHANGE UP (ref 150–400)
POTASSIUM SERPL-MCNC: 3.5 MMOL/L — SIGNIFICANT CHANGE UP (ref 3.5–5.3)
POTASSIUM SERPL-SCNC: 3.5 MMOL/L — SIGNIFICANT CHANGE UP (ref 3.5–5.3)
RBC # BLD: 3.63 M/UL — LOW (ref 3.8–5.2)
RBC # FLD: 12.6 % — SIGNIFICANT CHANGE UP (ref 10.3–14.5)
SODIUM SERPL-SCNC: 142 MMOL/L — SIGNIFICANT CHANGE UP (ref 135–145)
WBC # BLD: 4.35 K/UL — SIGNIFICANT CHANGE UP (ref 3.8–10.5)
WBC # FLD AUTO: 4.35 K/UL — SIGNIFICANT CHANGE UP (ref 3.8–10.5)

## 2021-08-19 PROCEDURE — 99232 SBSQ HOSP IP/OBS MODERATE 35: CPT

## 2021-08-19 PROCEDURE — 99231 SBSQ HOSP IP/OBS SF/LOW 25: CPT

## 2021-08-19 RX ORDER — POTASSIUM CHLORIDE 20 MEQ
40 PACKET (EA) ORAL ONCE
Refills: 0 | Status: COMPLETED | OUTPATIENT
Start: 2021-08-19 | End: 2021-08-19

## 2021-08-19 RX ORDER — MAGNESIUM OXIDE 400 MG ORAL TABLET 241.3 MG
400 TABLET ORAL
Refills: 0 | Status: DISCONTINUED | OUTPATIENT
Start: 2021-08-19 | End: 2021-08-20

## 2021-08-19 RX ADMIN — MAGNESIUM OXIDE 400 MG ORAL TABLET 400 MILLIGRAM(S): 241.3 TABLET ORAL at 14:17

## 2021-08-19 RX ADMIN — Medication 105 MILLIGRAM(S): at 18:45

## 2021-08-19 RX ADMIN — SODIUM CHLORIDE 3 MILLILITER(S): 9 INJECTION INTRAMUSCULAR; INTRAVENOUS; SUBCUTANEOUS at 14:48

## 2021-08-19 RX ADMIN — Medication 1 MILLIGRAM(S): at 18:45

## 2021-08-19 RX ADMIN — Medication 125 MICROGRAM(S): at 08:39

## 2021-08-19 RX ADMIN — Medication 105 MILLIGRAM(S): at 05:58

## 2021-08-19 RX ADMIN — PREGABALIN 1000 MICROGRAM(S): 225 CAPSULE ORAL at 14:13

## 2021-08-19 RX ADMIN — Medication 1 MILLIGRAM(S): at 02:05

## 2021-08-19 RX ADMIN — Medication 1 MILLIGRAM(S): at 10:19

## 2021-08-19 RX ADMIN — Medication 40 MILLIEQUIVALENT(S): at 10:19

## 2021-08-19 RX ADMIN — RIVAROXABAN 20 MILLIGRAM(S): KIT at 18:47

## 2021-08-19 RX ADMIN — Medication 1 MILLIGRAM(S): at 14:13

## 2021-08-19 RX ADMIN — Medication 1 PACKET(S): at 08:39

## 2021-08-19 RX ADMIN — Medication 0.5 MILLIGRAM(S): at 22:34

## 2021-08-19 RX ADMIN — SODIUM CHLORIDE 3 MILLILITER(S): 9 INJECTION INTRAMUSCULAR; INTRAVENOUS; SUBCUTANEOUS at 06:27

## 2021-08-19 RX ADMIN — MAGNESIUM OXIDE 400 MG ORAL TABLET 400 MILLIGRAM(S): 241.3 TABLET ORAL at 18:48

## 2021-08-19 RX ADMIN — SERTRALINE 50 MILLIGRAM(S): 25 TABLET, FILM COATED ORAL at 14:13

## 2021-08-19 RX ADMIN — SODIUM CHLORIDE 3 MILLILITER(S): 9 INJECTION INTRAMUSCULAR; INTRAVENOUS; SUBCUTANEOUS at 22:27

## 2021-08-19 RX ADMIN — Medication 1 MILLIGRAM(S): at 14:12

## 2021-08-19 RX ADMIN — Medication 105 MILLIGRAM(S): at 22:34

## 2021-08-19 RX ADMIN — Medication 1 MILLIGRAM(S): at 05:57

## 2021-08-19 RX ADMIN — Medication 100 MILLIGRAM(S): at 05:57

## 2021-08-19 RX ADMIN — Medication 50 MILLIGRAM(S): at 22:34

## 2021-08-19 NOTE — DIETITIAN INITIAL EVALUATION ADULT. - OTHER INFO
77 y/o female admitted with dx alcohol dependence with withdrawal. Nutrition consult generated for Bariatric Surgery. Visited with pt to obtain nutrition hx and inquired of hx bariatric surgery. Pt denies having any type of bariatric surgery in the past. She presently has fair oral intake due to feeling "down." She denies food allergies, nausea/vomiting/diarrhea/constipation, or issues with chewing/swallowing; last BM 8/17. No food preferences offered at this time. She has been binge drinking heavily for over a year due to the ongoing pandemic and "so much wrong with the world." BH following with pt. Offered ONS to optimize oral intake of patient - she is amenable to trying Ensure Clear 2x daily (360 kannan and 16 gm protein). No significant weight changes PTA. Reinforced DASH/TLC restrictions with patient - she was not "up to" a full education at this time. Provided patient with RDN availability should she need further diet education or nutrition intervention. RDN services to remain available as needed.

## 2021-08-19 NOTE — DIETITIAN INITIAL EVALUATION ADULT. - ORAL INTAKE PTA/DIET HISTORY
Pt said she had been drinking 5 - 6 shots of vodka and 1 - 2 bottles of wine daily to help with feelings of depression and anxiety regarding the pandemic.

## 2021-08-19 NOTE — PROGRESS NOTE ADULT - PROBLEM SELECTOR PLAN 2
On admission endorsed passive SI saying "I just want to die"  - currently denies SI/HI  - appreciate psychiatry recommendations - trazadone reduced to 50mg qhs PRN, c/w Sertraline 50mg daily, outpatient followup at [facility] [place]  - hold home alprazolam 1mg TID (ISTOP [id number]) as patient on ativan taper as above

## 2021-08-19 NOTE — DIETITIAN INITIAL EVALUATION ADULT. - PERTINENT MEDS FT
MEDICATIONS  (STANDING):  cyanocobalamin 1000 MICROGram(s) Oral daily  digoxin     Tablet 125 MICROGram(s) Oral daily  folic acid 1 milliGRAM(s) Oral daily  LORazepam     Tablet   Oral   LORazepam     Tablet 1 milliGRAM(s) Oral every 4 hours  LORazepam     Tablet 0.5 milliGRAM(s) Oral every 4 hours  magnesium oxide 400 milliGRAM(s) Oral three times a day with meals  metoprolol succinate  milliGRAM(s) Oral daily  rivaroxaban 20 milliGRAM(s) Oral with dinner  sertraline 50 milliGRAM(s) Oral daily  sodium chloride 0.9% lock flush 3 milliLiter(s) IV Push every 8 hours  thiamine IVPB 500 milliGRAM(s) IV Intermittent three times a day

## 2021-08-19 NOTE — PROGRESS NOTE ADULT - PROBLEM SELECTOR PLAN 3
Patient with 2-3 loose BMs yesterday now improved   - no recent abx use or hospitalizations  - low suspicion for c.diff, GI PCR negative   - monitor stool count

## 2021-08-19 NOTE — BH CONSULTATION LIAISON PROGRESS NOTE - NSBHFUPINTERVALHXFT_PSY_A_CORE
Met with patient. Calm, engages well. Oriented x 3. Denies any mood symptoms, denies any si or hi, denies any a/vh or paranoia. She states that she had not been taking Zoloft, Trazodone or Xanax consistently at home. Denies any w/d symptoms today.   Educated patient that she is on Zoloft now, has to be compliant with it. Educated that since she has not been taking xanax at home, and she will complete an ativan taper here, there is no indication for her to go back on Xanax. She understands.     Gave her written information for [facility], and she knows that she must call to make appt.  Called  Michael [phone]. He states that he will assist with making above appt. He states that there is no safety concerns at home re: si or hi.

## 2021-08-19 NOTE — BH CONSULTATION LIAISON PROGRESS NOTE - NSBHCHARTREVIEWLAB_PSY_A_CORE FT
CBC Full  -  ( 19 Aug 2021 07:28 )  WBC Count : 4.35 K/uL  RBC Count : 3.63 M/uL  Hemoglobin : 12.3 g/dL  Hematocrit : 37.0 %  Platelet Count - Automated : 158 K/uL  Mean Cell Volume : 101.9 fL  Mean Cell Hemoglobin : 33.9 pg  Mean Cell Hemoglobin Concentration : 33.2 gm/dL  Auto Neutrophil # : x  Auto Lymphocyte # : x  Auto Monocyte # : x  Auto Eosinophil # : x  Auto Basophil # : x  Auto Neutrophil % : x  Auto Lymphocyte % : x  Auto Monocyte % : x  Auto Eosinophil % : x  Auto Basophil % : x  08-19    142  |  107  |  6<L>  ----------------------------<  95  3.5   |  23  |  0.47<L>    Ca    9.2      19 Aug 2021 07:28  Phos  3.3     08-19  Mg     1.60     08-19

## 2021-08-19 NOTE — BH CONSULTATION LIAISON PROGRESS NOTE - NSBHCHARTREVIEWVS_PSY_A_CORE FT
Vital Signs Last 24 Hrs  T(C): 36.8 (19 Aug 2021 13:24), Max: 37.1 (18 Aug 2021 22:00)  T(F): 98.2 (19 Aug 2021 13:24), Max: 98.7 (18 Aug 2021 22:00)  HR: 83 (19 Aug 2021 13:24) (74 - 99)  BP: 134/80 (19 Aug 2021 13:24) (131/81 - 148/92)  BP(mean): --  RR: 17 (19 Aug 2021 13:24) (17 - 18)  SpO2: 100% (19 Aug 2021 13:24) (96% - 100%)

## 2021-08-19 NOTE — BH CONSULTATION LIAISON PROGRESS NOTE - CURRENT MEDICATION
MEDICATIONS  (STANDING):  cyanocobalamin 1000 MICROGram(s) Oral daily  digoxin     Tablet 125 MICROGram(s) Oral daily  folic acid 1 milliGRAM(s) Oral daily  LORazepam     Tablet   Oral   LORazepam     Tablet 1 milliGRAM(s) Oral every 4 hours  LORazepam     Tablet 0.5 milliGRAM(s) Oral every 4 hours  magnesium oxide 400 milliGRAM(s) Oral three times a day with meals  metoprolol succinate  milliGRAM(s) Oral daily  rivaroxaban 20 milliGRAM(s) Oral with dinner  sertraline 50 milliGRAM(s) Oral daily  sodium chloride 0.9% lock flush 3 milliLiter(s) IV Push every 8 hours  thiamine IVPB 500 milliGRAM(s) IV Intermittent three times a day    MEDICATIONS  (PRN):  LORazepam   Injectable 2 milliGRAM(s) IV Push every 2 hours PRN Symptom-triggered: 2 point increase in CIWA -Ar score and a total score of 7 or LESS  LORazepam   Injectable 2 milliGRAM(s) IV Push every 1 hour PRN Symptom-triggered: each CIWA -Ar score 8 or GREATER  traZODone 50 milliGRAM(s) Oral at bedtime PRN Sleep

## 2021-08-19 NOTE — BH CONSULTATION LIAISON PROGRESS NOTE - NSBHASSESSMENTFT_PSY_ALL_CORE
76F , domiciled w/ , 2 children, homemaker w/ PMH of Lung CA, arthritis, and AFib w/ PPHx of depression, anxiety, chronic alcohol abuse, BZD/Opioid use w/ 1 prior psychiatric admission 20 years ago for suicide attempt via cutting wrist and multiple attempts at detox via rehab presents to the ED complaining of depression w/ SI and EtOH withdrawal. Psychiatry consulted for SI/EtOH management.     Patient seen AOx4, anxious, full affect, linear, endorsing depression and passive suicidality. Since COVID has been more depressed/anxious and not wanting to leave the house, therefore, has been drinking more often about 5-6 shots of vodka and 1-2 bottles of wine per day and has attempted multiple times to detox at different rehab facilities in the last couple of years. Reports 1 SA over 20 years ago during menopause and has not attempted since. She reports being more often than not compliant w/ medications prescribed by PCP - Unsuccessful attempts in seeing a psychiatrist. Patient taking Xanax 2-3 times per day for "panic attacks" and also Percocet BID for OA pain. Reports decreased appetite and adequate sleep also taking Trazodone. Currently patient still passively suicidal not wanting to live in such an overwhelming environment, however, she has no plan/intent. Also denies HI/AH/VH and remains future-oriented and goal-oriented to return home to sit outside and enjoy her yard and .    CIWA scores 4 currently; VSS stable; Folate/TSH WNL; B12 low; Tox screen + BZD and opiates; EtOH level 225    8/19--- denies any mood symptoms, denies any si or hi, no psychosis. Coordinated care with . Ativan taper ongoing    PLAN:  - Routine observation   - Continue with CIWA protocol and Ativan taper   - Thiamine 500mg IV TID x 3 days (Ordered) then switch to 100mg PO QD  - Trazodone 50mg PO qHS PRN for sleep   - C/w home Zoloft 50mg PO QD--- now taking consistently  - Patient also taking Percocet 5/325mg BID for OA pain will defer to primary team  - Do not re-start home Xanax at this time as patient on Ativan taper. Patient has not been consistently taking this at home. No need to restart  - Ativan 1mg PO/IV/IM q 6 hours PRN for any agitation   - Monitor vital signs closely for any changes that may indicate worsening withdrawal (HTN/Tachycardia)  - May provide patient w/ the following outpatient referrals upon DC: [facility] outpatient Geriatric clinic [phone]; [facility] Substance clinic ([facility]) [phone] [address] [place] [address]

## 2021-08-19 NOTE — PROGRESS NOTE ADULT - SUBJECTIVE AND OBJECTIVE BOX
once daily 18  Yes Historical Provider, MD   metoprolol succinate (TOPROL XL) 25 MG extended release tablet Take 25 mg by mouth 17 Yes Historical Provider, MD   metFORMIN (GLUCOPHAGE) 500 MG tablet take 1 tablet by mouth twice a day with food 18  Yes Historical Provider, MD   memantine ER (NAMENDA XR) 28 MG CP24 extended release capsule take 1 capsule by mouth daily 18 Yes Historical Provider, MD   Calcium Carb-Cholecalciferol 500-600 MG-UNIT CHEW Take by mouth    Historical Provider, MD   lacosamide (VIMPAT) 100 MG TABS tablet Take 100 mg by mouth. . 18  Historical Provider, MD        Allergies:     Patient has no allergy information on record. Social History:     Tobacco:    has no tobacco history on file. Alcohol:      has no alcohol history on file. Drug Use:  has no drug history on file. Family History:     No family history on file. Review of Systems:     ROS:  Constitutional  Negative for fever and chills    HEENT  Negative for ear discharge, ear pain, nosebleed    Eyes  Negative for photophobia, pain and discharge    Respiratory  Negative for hemoptysis and sputum    Cardiovascular  Negative for orthopnea, claudication and PND    Gastrointestinal  Negative for abdominal pain, diarrhea, blood in stool    Musculoskeletal  Negative for joint pain, negative for myalgia    Skin  Negative for rash or itching    Endo/heme/allergies  Negative for polydipsia, environmental allergy    Psychiatric/behavioral  Negative for suicidal ideation. Patient is not anxious        Physical Exam:   BP 96/64   Pulse 71   Temp 98.8 °F (37.1 °C) (Oral)   Resp 18   Ht 5' 1\" (1.549 m)   Wt 155 lb 13.8 oz (70.7 kg)   BMI 29.45 kg/m²   Temp (24hrs), Av.8 °F (37.1 °C), Min:98.8 °F (37.1 °C), Max:98.8 °F (37.1 °C)    No results for input(s): POCGLU in the last 72 hours.   No intake or output data in the 24 hours ending 18 0412    General Appearance:  alert, well Frances Faye MD  [facility] Division of Hospital Medicine  Pager [phone] (M-F 8AM-5PM)  Other Times: [phone]    Patient is a 76y old  Female who presents with a chief complaint of Alcohol Withdrawal (19 Aug 2021 10:41)    SUBJECTIVE / OVERNIGHT EVENTS: Patient seen and examined at bedside. Feeling well, denies anxiety or tremors. No further diarrhea.     MEDICATIONS  (STANDING):  cyanocobalamin 1000 MICROGram(s) Oral daily  digoxin     Tablet 125 MICROGram(s) Oral daily  folic acid 1 milliGRAM(s) Oral daily  LORazepam     Tablet   Oral   LORazepam     Tablet 1 milliGRAM(s) Oral every 4 hours  LORazepam     Tablet 0.5 milliGRAM(s) Oral every 4 hours  magnesium oxide 400 milliGRAM(s) Oral three times a day with meals  metoprolol succinate  milliGRAM(s) Oral daily  rivaroxaban 20 milliGRAM(s) Oral with dinner  sertraline 50 milliGRAM(s) Oral daily  sodium chloride 0.9% lock flush 3 milliLiter(s) IV Push every 8 hours  thiamine IVPB 500 milliGRAM(s) IV Intermittent three times a day    MEDICATIONS  (PRN):  LORazepam   Injectable 2 milliGRAM(s) IV Push every 2 hours PRN Symptom-triggered: 2 point increase in CIWA -Ar score and a total score of 7 or LESS  LORazepam   Injectable 2 milliGRAM(s) IV Push every 1 hour PRN Symptom-triggered: each CIWA -Ar score 8 or GREATER  traZODone 50 milliGRAM(s) Oral at bedtime PRN Sleep      PHYSICAL EXAM:  Vital Signs Last 24 Hrs  T(C): 36.6 (19 Aug 2021 10:00), Max: 37.1 (18 Aug 2021 22:00)  T(F): 97.9 (19 Aug 2021 10:00), Max: 98.7 (18 Aug 2021 22:00)  HR: 74 (19 Aug 2021 10:00) (74 - 99)  BP: 143/88 (19 Aug 2021 10:00) (131/81 - 148/92)  BP(mean): --  RR: 17 (19 Aug 2021 10:00) (17 - 18)  SpO2: 96% (19 Aug 2021 10:00) (96% - 100%)    CONSTITUTIONAL: NAD, well-developed, well-groomed  RESPIRATORY: Normal respiratory effort; lungs are clear to auscultation bilaterally  CARDIOVASCULAR: Regular rate and rhythm, normal S1 and S2, no murmur/rub/gallop; No lower extremity edema  GASTROINTESTINAL: Nontender to palpation, normoactive bowel sounds, no rebound/guarding; No hepatosplenomegaly  MUSCULOSKELETAL:  no clubbing or cyanosis of digits; no joint swelling or tenderness to palpation  NEUROLOGY: non-focal; no gross sensory deficits   PSYCH: A+O to person, place, and time; affect appropriate  SKIN: No rashes; warm     LABS:                        12.3   4.35  )-----------( 158      ( 19 Aug 2021 07:28 )             37.0     08-19    142  |  107  |  6<L>  ----------------------------<  95  3.5   |  23  |  0.47<L>    Ca    9.2      19 Aug 2021 07:28  Phos  3.3     08-19  Mg     1.60     08-19                GI PCR Panel, Stool (collected 18 Aug 2021 15:05)  Source: .Stool Feces  Final Report (18 Aug 2021 19:27):    GI PCR Results: NOT detected    *******Please Note:*******    GI panel PCR evaluates for:    Campylobacter, Plesiomonas shigelloides, Salmonella,    Vibrio, Yersinia enterocolitica, Enteroaggregative    Escherichia coli (EAEC), Enteropathogenic E.coli (EPEC),    Enterotoxigenic E. coli (ETEC) lt/st, Shiga-like    toxin-producing E. coli (STEC) stx1/stx2,    Shigella/ Enteroinvasive E. coli (EIEC), Cryptosporidium,    Cyclospora cayetanensis, Entamoeba histolytica,    Giardia lamblia, Adenovirus F 40/41, Astrovirus,    Norovirus GI/GII, Rotavirus A, Sapovirus    Culture - Urine (collected 16 Aug 2021 21:27)  Source: Clean Catch Clean Catch (Midstream)  Final Report (17 Aug 2021 21:51):    <10,000 CFU/mL Normal Urogenital Mily        RADIOLOGY & ADDITIONAL TESTS:  Results Reviewed:   Imaging Personally Reviewed:  Electrocardiogram Personally Reviewed:    COORDINATION OF CARE:  Care Discussed with Consultants/Other Providers [Y/N]:  Prior or Outpatient Records Reviewed [Y/N]:

## 2021-08-20 ENCOUNTER — TRANSCRIPTION ENCOUNTER (OUTPATIENT)
Age: 76
End: 2021-08-20

## 2021-08-20 VITALS
RESPIRATION RATE: 18 BRPM | HEART RATE: 67 BPM | OXYGEN SATURATION: 99 % | DIASTOLIC BLOOD PRESSURE: 80 MMHG | SYSTOLIC BLOOD PRESSURE: 131 MMHG | TEMPERATURE: 99 F

## 2021-08-20 PROCEDURE — 99239 HOSP IP/OBS DSCHRG MGMT >30: CPT

## 2021-08-20 RX ORDER — TRAZODONE HCL 50 MG
1 TABLET ORAL
Qty: 0 | Refills: 0 | DISCHARGE

## 2021-08-20 RX ORDER — PREGABALIN 225 MG/1
1 CAPSULE ORAL
Qty: 30 | Refills: 0
Start: 2021-08-20 | End: 2021-09-18

## 2021-08-20 RX ORDER — METOPROLOL TARTRATE 50 MG
1 TABLET ORAL
Qty: 30 | Refills: 0
Start: 2021-08-20 | End: 2021-09-18

## 2021-08-20 RX ORDER — RIVAROXABAN 15 MG-20MG
1 KIT ORAL
Qty: 0 | Refills: 0 | DISCHARGE

## 2021-08-20 RX ORDER — TRAZODONE HCL 50 MG
1 TABLET ORAL
Qty: 30 | Refills: 0
Start: 2021-08-20 | End: 2021-09-18

## 2021-08-20 RX ORDER — ALPRAZOLAM 0.25 MG
1 TABLET ORAL
Qty: 0 | Refills: 0 | DISCHARGE

## 2021-08-20 RX ORDER — SERTRALINE 25 MG/1
1 TABLET, FILM COATED ORAL
Qty: 30 | Refills: 0
Start: 2021-08-20 | End: 2021-09-18

## 2021-08-20 RX ORDER — FOLIC ACID 0.8 MG
1 TABLET ORAL
Qty: 30 | Refills: 0
Start: 2021-08-20 | End: 2021-09-18

## 2021-08-20 RX ORDER — METOPROLOL TARTRATE 50 MG
1 TABLET ORAL
Qty: 0 | Refills: 0 | DISCHARGE

## 2021-08-20 RX ORDER — DIGOXIN 250 MCG
1 TABLET ORAL
Qty: 30 | Refills: 0
Start: 2021-08-20 | End: 2021-09-18

## 2021-08-20 RX ORDER — RIVAROXABAN 15 MG-20MG
1 KIT ORAL
Qty: 30 | Refills: 0
Start: 2021-08-20 | End: 2021-09-18

## 2021-08-20 RX ORDER — DIGOXIN 250 MCG
1 TABLET ORAL
Qty: 0 | Refills: 0 | DISCHARGE

## 2021-08-20 RX ORDER — SERTRALINE 25 MG/1
1 TABLET, FILM COATED ORAL
Qty: 0 | Refills: 0 | DISCHARGE

## 2021-08-20 RX ADMIN — Medication 0.5 MILLIGRAM(S): at 15:31

## 2021-08-20 RX ADMIN — Medication 100 MILLIGRAM(S): at 06:53

## 2021-08-20 RX ADMIN — Medication 0.5 MILLIGRAM(S): at 11:14

## 2021-08-20 RX ADMIN — PREGABALIN 1000 MICROGRAM(S): 225 CAPSULE ORAL at 11:14

## 2021-08-20 RX ADMIN — Medication 105 MILLIGRAM(S): at 06:56

## 2021-08-20 RX ADMIN — MAGNESIUM OXIDE 400 MG ORAL TABLET 400 MILLIGRAM(S): 241.3 TABLET ORAL at 11:13

## 2021-08-20 RX ADMIN — SODIUM CHLORIDE 3 MILLILITER(S): 9 INJECTION INTRAMUSCULAR; INTRAVENOUS; SUBCUTANEOUS at 07:35

## 2021-08-20 RX ADMIN — MAGNESIUM OXIDE 400 MG ORAL TABLET 400 MILLIGRAM(S): 241.3 TABLET ORAL at 08:12

## 2021-08-20 RX ADMIN — Medication 0.5 MILLIGRAM(S): at 02:35

## 2021-08-20 RX ADMIN — Medication 1 MILLIGRAM(S): at 11:13

## 2021-08-20 RX ADMIN — SERTRALINE 50 MILLIGRAM(S): 25 TABLET, FILM COATED ORAL at 11:14

## 2021-08-20 RX ADMIN — Medication 125 MICROGRAM(S): at 08:12

## 2021-08-20 RX ADMIN — RIVAROXABAN 20 MILLIGRAM(S): KIT at 17:30

## 2021-08-20 RX ADMIN — SODIUM CHLORIDE 3 MILLILITER(S): 9 INJECTION INTRAMUSCULAR; INTRAVENOUS; SUBCUTANEOUS at 15:13

## 2021-08-20 RX ADMIN — Medication 0.5 MILLIGRAM(S): at 06:52

## 2021-08-20 NOTE — DISCHARGE NOTE PROVIDER - NSDCMRMEDTOKEN_GEN_ALL_CORE_FT
ALPRAZolam 1 mg oral tablet: 1 tab(s) orally 3 times a day  digoxin 125 mcg (0.125 mg) oral tablet: 1 tab(s) orally once a day  oxycodone-acetaminophen 5 mg-325 mg oral tablet: 1 tab(s) orally every 6 hours, As Needed  sertraline 50 mg oral tablet: 1 tab(s) orally once a day  Toprol- mg oral tablet, extended release: 1 tab(s) orally once a day  traZODone 100 mg oral tablet: 1 tab(s) orally once a day (at bedtime)  Xarelto 20 mg oral tablet: 1 tab(s) orally once a day (in the evening)    ALPRAZolam 1 mg oral tablet: 1 tab(s) orally 3 times a day, As Needed  cyanocobalamin 1000 mcg oral tablet: 1 tab(s) orally once a day  digoxin 125 mcg (0.125 mg) oral tablet: 1 tab(s) orally once a day  folic acid 1 mg oral tablet: 1 tab(s) orally once a day  oxycodone-acetaminophen 5 mg-325 mg oral tablet: 1 tab(s) orally every 6 hours, As Needed  sertraline 50 mg oral tablet: 1 tab(s) orally once a day  Toprol- mg oral tablet, extended release: 1 tab(s) orally once a day  traZODone 50 mg oral tablet: 1 tab(s) orally once a day (at bedtime), As needed, Sleep  Xarelto 20 mg oral tablet: 1 tab(s) orally once a day (in the evening)

## 2021-08-20 NOTE — PROGRESS NOTE ADULT - PROBLEM SELECTOR PLAN 6
DVT ppx: therapeutically anticoagulated on Xarelto  DIET: DASH/TLC  DISPO: Home. Pending completion of ativan taper.     Discussed with patient and ACP Milka.
DVT ppx: therapeutically anticoagulated on Xarelto  DIET: DASH/TLC  DISPO: Home. Patient stable for discharge home today with followup with outpatient psychiatry. DC planning - 40 minutes.     Discussed with patient and ACP Milka.
DVT ppx: therapeutically anticoagulated on Xarelto  DIET: DASH/TLC  DISPO: Pending improvement of EtOH withdrawal, ativan taper.     Discussed with patient and ACP Zonia.

## 2021-08-20 NOTE — DISCHARGE NOTE PROVIDER - HOSPITAL COURSE
76F with hx of Afib on Xarelto, Lung Cancer, Depression, EtOH use, who presents complaining of passive suicidal ideation, admitted for EtOH withdrawal and psychiatry evaluation.   Patient was admitted to medicine service and placed on CIWA protocol and an ativan taper, electrolyte abnormalities were supplemented.  Patient was evaluated by psychiatry for concerns of severe depression and medication adjustments were made with improvements in behavior.  Digoxin and xarelto continued for atrial fibrillation.  Stool Studies were sent d/t complaints of diarrhea which were negative.  Patient was seen by physical therapy and determined to have no skilled PT needs.    On 8/20/21 patient medically cleared for discharge home by  _________, she has been provided with the [facility] behavioral clinic and [facility] program for outpatient followup.           76F with hx of Afib on Xarelto, Lung Cancer, Depression, EtOH use, who presents complaining of passive suicidal ideation, admitted for EtOH withdrawal and psychiatry evaluation.   Patient was admitted to medicine service and placed on CIWA protocol and an ativan taper, electrolyte abnormalities were supplemented.  Patient was evaluated by psychiatry for concerns of severe depression and medication adjustments were made with improvements in behavior.  Digoxin and xarelto continued for atrial fibrillation.  Stool Studies were sent d/t complaints of diarrhea which were negative.  Patient was seen by physical therapy and determined to have no skilled PT needs.    On 8/20/21 patient medically cleared for discharge home by Dr. Faye, she has been provided with the [facility] behavioral clinic and [facility] program for outpatient followup.

## 2021-08-20 NOTE — PROGRESS NOTE ADULT - PROBLEM SELECTOR PROBLEM 1
Alcohol dependence with withdrawal, uncomplicated

## 2021-08-20 NOTE — PROGRESS NOTE ADULT - PROBLEM SELECTOR PLAN 3
Resolved   - no recent abx use or hospitalizations  - low suspicion for c.diff, GI PCR negative   - monitor stool count

## 2021-08-20 NOTE — DISCHARGE NOTE PROVIDER - CARE PROVIDER_API CALL
Ezio Lutz  CARDIOLOGY  [address] [place] [address], [address]  [place] [address]  Phone: [phone]  Fax: [phone]  Established Patient  Follow Up Time: 1 week

## 2021-08-20 NOTE — PROGRESS NOTE ADULT - PROBLEM SELECTOR PLAN 2
On admission endorsed passive SI saying "I just want to die"  - currently denies SI/HI  - appreciate psychiatry recommendations - trazadone reduced to 50mg qhs PRN, c/w Sertraline 50mg daily, outpatient followup at [facility]  - hold home alprazolam 1mg TID (ISTOP [id number]) as patient on ativan taper as above

## 2021-08-20 NOTE — PROGRESS NOTE ADULT - SUBJECTIVE AND OBJECTIVE BOX
Frances Faye MD  [facility] Division of Hospital Medicine  Pager [phone] (M-F 8AM-5PM)  Other Times: [phone]    Patient is a 76y old  Female who presents with a chief complaint of Alcohol Withdrawal (20 Aug 2021 07:14)    SUBJECTIVE / OVERNIGHT EVENTS: Patient seen and examined at bedside. No complaints.     MEDICATIONS  (STANDING):  cyanocobalamin 1000 MICROGram(s) Oral daily  digoxin     Tablet 125 MICROGram(s) Oral daily  folic acid 1 milliGRAM(s) Oral daily  metoprolol succinate  milliGRAM(s) Oral daily  rivaroxaban 20 milliGRAM(s) Oral with dinner  sertraline 50 milliGRAM(s) Oral daily  sodium chloride 0.9% lock flush 3 milliLiter(s) IV Push every 8 hours    MEDICATIONS  (PRN):  LORazepam   Injectable 2 milliGRAM(s) IV Push every 2 hours PRN Symptom-triggered: 2 point increase in CIWA -Ar score and a total score of 7 or LESS  LORazepam   Injectable 2 milliGRAM(s) IV Push every 1 hour PRN Symptom-triggered: each CIWA -Ar score 8 or GREATER  traZODone 50 milliGRAM(s) Oral at bedtime PRN Sleep      PHYSICAL EXAM:  Vital Signs Last 24 Hrs  T(C): 37 (20 Aug 2021 13:36), Max: 37.2 (19 Aug 2021 22:30)  T(F): 98.6 (20 Aug 2021 13:36), Max: 99 (19 Aug 2021 22:30)  HR: 67 (20 Aug 2021 13:36) (67 - 89)  BP: 131/80 (20 Aug 2021 13:36) (128/80 - 147/92)  RR: 18 (20 Aug 2021 13:36) (17 - 18)  SpO2: 99% (20 Aug 2021 13:36) (96% - 100%)    CONSTITUTIONAL: NAD, well-developed, well-groomed  RESPIRATORY: Normal respiratory effort; lungs are clear to auscultation bilaterally  CARDIOVASCULAR: Regular rate and rhythm, normal S1 and S2, no murmur/rub/gallop; No lower extremity edema  GASTROINTESTINAL: Nontender to palpation, normoactive bowel sounds, no rebound/guarding; No hepatosplenomegaly  MUSCULOSKELETAL:  no clubbing or cyanosis of digits; no joint swelling or tenderness to palpation  NEUROLOGY: non-focal; no gross sensory deficits   PSYCH: A+O to person, place, and time; affect appropriate  SKIN: No rashes; warm     LABS:                        12.3   4.35  )-----------( 158      ( 19 Aug 2021 07:28 )             37.0     08-19    142  |  107  |  6<L>  ----------------------------<  95  3.5   |  23  |  0.47<L>    Ca    9.2      19 Aug 2021 07:28  Phos  3.3     08-19  Mg     1.60     08-19                GI PCR Panel, Stool (collected 18 Aug 2021 15:05)  Source: .Stool Feces  Final Report (18 Aug 2021 19:27):    GI PCR Results: NOT detected    *******Please Note:*******    GI panel PCR evaluates for:    Campylobacter, Plesiomonas shigelloides, Salmonella,    Vibrio, Yersinia enterocolitica, Enteroaggregative    Escherichia coli (EAEC), Enteropathogenic E.coli (EPEC),    Enterotoxigenic E. coli (ETEC) lt/st, Shiga-like    toxin-producing E. coli (STEC) stx1/stx2,    Shigella/ Enteroinvasive E. coli (EIEC), Cryptosporidium,    Cyclospora cayetanensis, Entamoeba histolytica,    Giardia lamblia, Adenovirus F 40/41, Astrovirus,    Norovirus GI/GII, Rotavirus A, Sapovirus        RADIOLOGY & ADDITIONAL TESTS:  Results Reviewed:   Imaging Personally Reviewed:  Electrocardiogram Personally Reviewed:    COORDINATION OF CARE:  Care Discussed with Consultants/Other Providers [Y/N]:  Prior or Outpatient Records Reviewed [Y/N]:

## 2021-08-20 NOTE — PROGRESS NOTE ADULT - PROBLEM SELECTOR PLAN 1
Chronic EtOH use, last drink 8/16  - high risk CIWA: ativan taper (to complete 8/22) + symptom triggered   - c/w folate, thiamine multivitamin   - monitor CIWA scores, fall/seizure/aspiration precautions  - SBIRT evaluation
Chronic EtOH use, last drink 8/16  - CIWA scores improving 0-1s  - high risk CIWA: c/w ativan taper + symptom triggered   - c/w folate, thiamine multivitamin   - monitor CIWA scores, fall/seizure/aspiration precautions, SBIRT eval
Chronic EtOH use, last drink 8/16  - CIWA scores improving 0-1s x72 hours  - high risk CIWA: complete ativan taper today   - c/w folate, thiamine multivitamin   - monitor CIWA scores, fall/seizure/aspiration precautions, SBIRT eval
Chronic EtOH use, last drink 8/16  - high risk CIWA: ativan taper (to complete 8/22) + symptom triggered   - c/w folate, thiamine multivitamin   - monitor CIWA scores, fall/seizure/aspiration precautions  - SBIRT evaluation

## 2021-08-20 NOTE — DISCHARGE NOTE NURSING/CASE MANAGEMENT/SOCIAL WORK - PATIENT PORTAL LINK FT
You can access the FollowMyHealth Patient Portal offered by [facility] by registering at the following website: [email or web address]. By joining [email or web address]’s FollowMyHealth portal, you will also be able to view your health information using other applications (apps) compatible with our system.

## 2021-08-20 NOTE — PROGRESS NOTE ADULT - PROBLEM SELECTOR PLAN 4
Endorses suprapubic pressure but denies dysuria  - UA negative  - continue to monitor
Rate controlled   - c/w home digoxin 125mcg daily and metoprolol succinate 100mg daily   - [id number] 3 - c/w Xarelto 20mg daily
Rate controlled   - c/w home digoxin 125mcg daily and metoprolol succinate 100mg daily   - [id number] 3 - c/w Xarelto 20mg daily
Rate controlled   - c/w home digoxin 125mcg daily and metoprolol succinate 100mg daily   - [id number] 3 - c/w Xarelto 20mg daily

## 2021-08-20 NOTE — DISCHARGE NOTE PROVIDER - NSDCCPCAREPLAN_GEN_ALL_CORE_FT
PRINCIPAL DISCHARGE DIAGNOSIS  Diagnosis: Alcohol withdrawal  Assessment and Plan of Treatment: Please abstain from ingesting alcohol in order to optimize your health and prevent adverse events. Continue to supplement with vitamins and follow-up with your primary care provider for further medical care. If you need immediate assistance with substance abuse you may contact the [facility] [phone]; [facility] ([facility]) [phone] [address] [place] [address]      SECONDARY DISCHARGE DIAGNOSES  Diagnosis: Anxiety disorder  Assessment and Plan of Treatment: Continue to take your medication as ordered. Follow up with your primary care provider or psychiatrist.You can call the [facility] [phone].    Diagnosis: Severe major depression  Assessment and Plan of Treatment: Continue to take your medication as ordered. Follow up with your primary care provider or psychiatrist.You can call the [facility] [phone].    Diagnosis: Atrial fibrillation  Assessment and Plan of Treatment: Continue to take your digoxin & Xarelto as ordered, follow up with your cardiologist for continued monitoring and treament.    Diagnosis: Hypertension  Assessment and Plan of Treatment: Continue current blood pressure medication regimen as directed. Monitor for any visual changes, headaches or dizziness.  Monitor blood pressure regularly.  Follow up with your PCP for further management for high blood pressure, please call to make appointment within 1 week of discharge

## 2021-08-20 NOTE — DISCHARGE NOTE NURSING/CASE MANAGEMENT/SOCIAL WORK - NSDCPEFALRISK_GEN_ALL_CORE
For information on Fall & injury Prevention, visit [email or web address]

## 2021-08-23 LAB
CULTURE RESULTS: SIGNIFICANT CHANGE UP
SPECIMEN SOURCE: SIGNIFICANT CHANGE UP

## 2022-01-04 NOTE — ED ADULT NURSE NOTE - CCCP TRG CHIEF CMPLNT
alcohol intoxication
Detail Level: Generalized
Quality 226: Preventive Care And Screening: Tobacco Use: Screening And Cessation Intervention: Patient screened for tobacco use and is an ex/non-smoker

## 2022-01-05 ENCOUNTER — APPOINTMENT (OUTPATIENT)
Dept: RADIOLOGY | Facility: IMAGING CENTER | Age: 77
End: 2022-01-05

## 2022-01-12 ENCOUNTER — OUTPATIENT (OUTPATIENT)
Dept: OUTPATIENT SERVICES | Facility: HOSPITAL | Age: 77
LOS: 1 days | End: 2022-01-12
Payer: MEDICARE

## 2022-01-12 ENCOUNTER — APPOINTMENT (OUTPATIENT)
Dept: CT IMAGING | Facility: IMAGING CENTER | Age: 77
End: 2022-01-12
Payer: MEDICARE

## 2022-01-12 DIAGNOSIS — Z98.890 OTHER SPECIFIED POSTPROCEDURAL STATES: Chronic | ICD-10-CM

## 2022-01-12 DIAGNOSIS — C34.92 MALIGNANT NEOPLASM OF UNSPECIFIED PART OF LEFT BRONCHUS OR LUNG: ICD-10-CM

## 2022-01-12 PROCEDURE — 71250 CT THORAX DX C-: CPT | Mod: 26,MH

## 2022-01-12 PROCEDURE — 71250 CT THORAX DX C-: CPT | Mod: MH

## 2022-03-17 NOTE — ED PROVIDER NOTE - NS ED MD DISPO SPECIAL CONSIDERATION1
Low Suspicion of COVID-19 I personally performed the service described in the documentation recorded by the scribe in my presence, and it accurately and completely records my words and actions.

## 2022-04-14 NOTE — BH CONSULTATION LIAISON ASSESSMENT NOTE - NSCURPASTPSYDX_PSY_ALL_CORE
EUA revealed small anteverted uterus, adnexa nonpalpable b/l. Diagnostic hysteroscope revealed b/l ostia wnl and 2 posterior polyps approximately 3cm in size removed with Myosure. Sharp curettage performed. Excellent hemostasis noted
Mood disorder/Alcohol/Substance Use disorders

## 2022-07-05 ENCOUNTER — APPOINTMENT (OUTPATIENT)
Dept: OTOLARYNGOLOGY | Facility: CLINIC | Age: 77
End: 2022-07-05

## 2022-07-05 VITALS
HEIGHT: 64 IN | HEART RATE: 69 BPM | SYSTOLIC BLOOD PRESSURE: 106 MMHG | BODY MASS INDEX: 21.17 KG/M2 | DIASTOLIC BLOOD PRESSURE: 68 MMHG | WEIGHT: 124 LBS

## 2022-07-05 DIAGNOSIS — J31.0 CHRONIC RHINITIS: ICD-10-CM

## 2022-07-05 DIAGNOSIS — H61.23 IMPACTED CERUMEN, BILATERAL: ICD-10-CM

## 2022-07-05 DIAGNOSIS — H90.3 SENSORINEURAL HEARING LOSS, BILATERAL: ICD-10-CM

## 2022-07-05 DIAGNOSIS — H69.82 OTHER SPECIFIED DISORDERS OF EUSTACHIAN TUBE, LEFT EAR: ICD-10-CM

## 2022-07-05 DIAGNOSIS — J34.2 DEVIATED NASAL SEPTUM: ICD-10-CM

## 2022-07-05 PROCEDURE — 99214 OFFICE O/P EST MOD 30 MIN: CPT | Mod: 25

## 2022-07-05 PROCEDURE — 92557 COMPREHENSIVE HEARING TEST: CPT

## 2022-07-05 PROCEDURE — 31231 NASAL ENDOSCOPY DX: CPT

## 2022-07-05 PROCEDURE — 92567 TYMPANOMETRY: CPT

## 2022-07-05 PROCEDURE — 69210 REMOVE IMPACTED EAR WAX UNI: CPT

## 2022-07-05 RX ORDER — TRAZODONE HYDROCHLORIDE 50 MG/1
50 TABLET ORAL
Qty: 30 | Refills: 0 | Status: ACTIVE | COMMUNITY
Start: 2022-05-10

## 2022-07-05 RX ORDER — TRAZODONE HYDROCHLORIDE 100 MG/1
100 TABLET ORAL
Qty: 30 | Refills: 0 | Status: ACTIVE | COMMUNITY
Start: 2021-07-06

## 2022-07-05 RX ORDER — FLUTICASONE PROPIONATE 50 UG/1
50 SPRAY, METERED NASAL
Qty: 1 | Refills: 5 | Status: ACTIVE | COMMUNITY
Start: 2022-07-05 | End: 1900-01-01

## 2022-07-05 RX ORDER — DIGOXIN 125 UG/1
125 TABLET ORAL
Qty: 90 | Refills: 0 | Status: ACTIVE | COMMUNITY
Start: 2021-11-05

## 2022-07-05 RX ORDER — SERTRALINE HYDROCHLORIDE 100 MG/1
100 TABLET, FILM COATED ORAL
Qty: 90 | Refills: 0 | Status: ACTIVE | COMMUNITY
Start: 2022-04-01

## 2022-07-05 NOTE — DATA REVIEWED
[de-identified] : Hearing Test performed to evaluate the extent of hearing loss and  to explain pt's symptoms\par today's hearing test was personally reviewed and revealed\par bilat [facility]

## 2022-07-05 NOTE — PROCEDURE
[FreeTextEntry3] : After informed verbal consent is obtained, cerumen is removed from the right and left ear canal with a curette and suction.\par  [FreeTextEntry6] : Anterior Rhinoscopy insufficient to account for symptoms.\par \par After informed verbal consent is obtained, the fiberoptic nasal endoscope #9 is passed via the right nasal cavity.\par The following anatomic sites were directly examined in a sequential fashion:\par The scope was introduced in both  nasal passages between the middle and inferior turbinates to exam the inferior portion of the middle meatus and the fontanelle, as well as the maxillary ostia.  Next, the scope was passed medially and posteriorly to the middle turbinates to examine the sphenoethmoid recess and the superior turbinate region.\par  \par \par   There is [ 0 ]% obstruction of the nasopharynx with adenoid tissue.\par \par A deviated nasal septum was noted causing obstruction.\par The turbinates were congested-bilateral.\par \par Right Side:\par ·               Mucosa: wnl\par ·               Mucous: none\par ·               Polyp: none\par ·               Inferior Turbinate: sl congested\par ·               Middle Turbinate:sl congested\par ·               Superior Turbinate: wnl\par ·               Inferior Meatus:clear\par ·               Middle Meatus: clear\par ·               Super Meatus: clear\par ·               Sphenoethmoidal Recess: wnl\par \par \par \par Left Side:\par ·               Mucosa: wnl\par ·               Mucous:none\par ·               Polyp: none\par ·               Inferior Turbinate: sl congested\par ·               Middle Turbinate: sl congested\par ·               Superior Turbinate:wnl\par ·               Inferior Meatus: clear\par ·               Middle Meatus: clear\par ·               Super Meatus:clear\par ·               Sphenoethmoidal Recess: wnl\par \par

## 2022-07-05 NOTE — ASSESSMENT
[FreeTextEntry1] : Ms. MAURICIO 77 year F complains of clogged left ear with muffled hearing x 1 month. She feels like she is on a plain and wants to pop her ears. \par \par Wax\par -Cerumen is removed from the right and left  ear canal with a curette and suction.\par -Routine debridement suggested to keep the ears free of wax.\par \par Left ETD and DNS:\par -Hearing Test performed to evaluate the extent of hearing loss and to explain pt's symptoms\par -Rx: Flonase \par \par \par bilateral sensorineural hearing loss-cleared for hearing aids\par \par f/u prn

## 2022-07-05 NOTE — PHYSICAL EXAM
[Midline] : trachea located in midline position [Normal] : no rashes [Nasal Endoscopy Performed] : nasal endoscopy was performed, see procedure section for findings [] : septum deviated to the right [de-identified] : b/l mynorn

## 2022-07-05 NOTE — END OF VISIT
[Time Spent: ___ minutes] : I have spent [unfilled] minutes of time on the encounter. [FreeTextEntry3] : I personally saw and examined SANTIAGO MAURICIO in detail. I spoke to RAHEEM Freitas regarding the assessment and plan of care. I reviewed the above assessment and plan of care, and agree. I have made changes in changes in the body of the note where appropriate.I personally reviewed the HPI, PMH, FH, SH, ROS and medications/allergies. I have spoken to RAHEEM Freitas regarding the history and have personally determined the assessment and plan of care, and documented this myself. I was present and participated in all key portions of the encounter and all procedures noted above. I have made changes in the body of the note where appropriate.\par \par Attesting Faculty: See Attending Signature Below \par \par \par

## 2022-07-05 NOTE — HISTORY OF PRESENT ILLNESS
[No] : patient does not have a  history of radiation therapy [Tinnitus] : tinnitus [de-identified] : 76 YO FEMALE\par Patient complains of clogged left ear and tinnitus x 1 month. She feels like she is on a plain. When she blows her nose her ears pop. SHe cant unclog her ear. Denies nasal congestion or stuffiness. Hearing is muffled in the left ear. Pt has no ear pain, ear drainage, vertigo, nasal congestion, nasal discharge, epistaxis, sinus infections, facial pain, facial pressure, throat pain, dysphagia or fevers\par \par \par  [Anxiety] : no anxiety [Dizziness] : no dizziness [Headache] : no headache [Hearing Loss] : no hearing loss [Recurrent Otitis Media] : no recurrent otitis media [Otitis Media with Effusion] : no otitis media with effusion [Presbycusis] : no presbycusis [Congenital Ear Malformation] : no congenital ear malformation [Meniere Disease] : no Meniere disease [Otosclerosis] : no otosclerosis [Perilymphatic Fistula] : no perilymphatic fistula [Loud Noise Exposure] : no history of loud noise exposure [Smoking] : no smoking [Early Onset Hearing Loss] : no early onset hearing loss [Stroke] : no stroke [Facial Pain] : no facial pain [Facial Pressure] : no facial pressure [Nasal Congestion] : no nasal congestion [Diplopia] : no diplopia [Ear Fullness] : no ear fullness [Allergic Rhinitis] : no allergic rhinitis [Maxillary Tooth Infection] : no maxillary tooth infection [Septal Deviation] : no septal deviation [Environmental Allergies] : no environmental allergies [Seasonal Allergies] : no seasonal allergies [Environmental Allergens] : no environmental allergens [Adenoidectomy] : no adenoidectomy [Nasal FB Removal] : no nasal foreign body removal [Allergies] : no allergies [Asthma] : no asthma [Neck Mass] : no neck mass [Neck Pain] : no neck pain [Chills] : no chills [Cold Intolerance] : no cold intolerance [Cough] : no cough [Fatigue] : no fatigue [Heat Intolerance] : no heat intolerance [Hyperthyroidism] : no hyperthyroidism [Sialadenitis] : no sialadenitis [Hodgkin Disease] : no hodgkin disease [Non-Hodgkin Lymphoma] : no non-hodgkin lymphoma [Tobacco Use] : no tobacco use [Alcohol Use] : no alcohol use [Graves Disease] : no graves disease [Thyroid Cancer] : no thyroid cancer

## 2022-10-04 ENCOUNTER — OUTPATIENT (OUTPATIENT)
Dept: OUTPATIENT SERVICES | Facility: HOSPITAL | Age: 77
LOS: 1 days | End: 2022-10-04
Payer: MEDICARE

## 2022-10-04 ENCOUNTER — APPOINTMENT (OUTPATIENT)
Dept: CT IMAGING | Facility: IMAGING CENTER | Age: 77
End: 2022-10-04

## 2022-10-04 DIAGNOSIS — Z98.890 OTHER SPECIFIED POSTPROCEDURAL STATES: Chronic | ICD-10-CM

## 2022-10-04 DIAGNOSIS — R63.4 ABNORMAL WEIGHT LOSS: ICD-10-CM

## 2022-10-04 PROCEDURE — 74176 CT ABD & PELVIS W/O CONTRAST: CPT | Mod: 26,MH

## 2022-10-04 PROCEDURE — 71250 CT THORAX DX C-: CPT | Mod: 26,MH

## 2022-10-04 PROCEDURE — 71250 CT THORAX DX C-: CPT | Mod: MH

## 2022-10-04 PROCEDURE — 74176 CT ABD & PELVIS W/O CONTRAST: CPT | Mod: MH

## 2022-11-21 NOTE — PHYSICAL THERAPY INITIAL EVALUATION ADULT - DIAGNOSIS, PT EVAL
Last office visit 8/18/2022, advised to follow-up 4 months, next appointment 0. Decreased functional mobility

## 2022-11-30 ENCOUNTER — RESULT REVIEW (OUTPATIENT)
Age: 77
End: 2022-11-30

## 2022-11-30 ENCOUNTER — APPOINTMENT (OUTPATIENT)
Dept: ORTHOPEDIC SURGERY | Facility: CLINIC | Age: 77
End: 2022-11-30

## 2022-11-30 DIAGNOSIS — M17.0 BILATERAL PRIMARY OSTEOARTHRITIS OF KNEE: ICD-10-CM

## 2022-11-30 DIAGNOSIS — M16.0 BILATERAL PRIMARY OSTEOARTHRITIS OF HIP: ICD-10-CM

## 2022-11-30 PROCEDURE — 72100 X-RAY EXAM L-S SPINE 2/3 VWS: CPT

## 2022-11-30 PROCEDURE — 73564 X-RAY EXAM KNEE 4 OR MORE: CPT | Mod: 50

## 2022-11-30 PROCEDURE — 72170 X-RAY EXAM OF PELVIS: CPT

## 2022-11-30 PROCEDURE — 99204 OFFICE O/P NEW MOD 45 MIN: CPT

## 2022-12-12 ENCOUNTER — APPOINTMENT (OUTPATIENT)
Dept: ORTHOPEDIC SURGERY | Facility: CLINIC | Age: 77
End: 2022-12-12

## 2022-12-12 ENCOUNTER — OUTPATIENT (OUTPATIENT)
Dept: OUTPATIENT SERVICES | Facility: HOSPITAL | Age: 77
LOS: 1 days | End: 2022-12-12
Payer: MEDICARE

## 2022-12-12 ENCOUNTER — NON-APPOINTMENT (OUTPATIENT)
Age: 77
End: 2022-12-12

## 2022-12-12 ENCOUNTER — APPOINTMENT (OUTPATIENT)
Dept: MRI IMAGING | Facility: HOSPITAL | Age: 77
End: 2022-12-12

## 2022-12-12 DIAGNOSIS — Z98.890 OTHER SPECIFIED POSTPROCEDURAL STATES: Chronic | ICD-10-CM

## 2022-12-12 DIAGNOSIS — Z00.8 ENCOUNTER FOR OTHER GENERAL EXAMINATION: ICD-10-CM

## 2022-12-12 PROCEDURE — 72197 MRI PELVIS W/O & W/DYE: CPT

## 2022-12-12 PROCEDURE — A9579: CPT

## 2022-12-12 PROCEDURE — 72197 MRI PELVIS W/O & W/DYE: CPT | Mod: 26,MH

## 2022-12-22 NOTE — ED ADULT NURSE NOTE - TOBACCO SCREENING (FROM THE ASSIST)
My signature below certifies that the above stated patient is homebound and upon completion of the Face-To-Face encounter, has the need for intermittent skilled nursing, physical therapy and/or speech or occupational therapy services in their home for their current diagnosis as outlined in their initial plan of care. These services will continue to be monitored by myself or another physician. Statement Selected

## 2023-01-04 ENCOUNTER — APPOINTMENT (OUTPATIENT)
Dept: ORTHOPEDIC SURGERY | Facility: CLINIC | Age: 78
End: 2023-01-04
Payer: MEDICARE

## 2023-01-04 DIAGNOSIS — M87.052 IDIOPATHIC ASEPTIC NECROSIS OF LEFT FEMUR: ICD-10-CM

## 2023-01-04 DIAGNOSIS — M48.061 SPINAL STENOSIS, LUMBAR REGION WITHOUT NEUROGENIC CLAUDICATION: ICD-10-CM

## 2023-01-04 PROCEDURE — 99214 OFFICE O/P EST MOD 30 MIN: CPT

## 2023-01-14 NOTE — ED PROVIDER NOTE - TOBACCO USE
Former smoker [Antalgic] : antalgic [LE] : Sensory: Intact in bilateral lower extremities [ALL] : dorsalis pedis, posterior tibial, femoral, popliteal, and radial 2+ and symmetric bilaterally [de-identified] : On physical examination of both hips.  Skin is clean dry and intact with no areas of redness swelling or heat noted.  Patient has adequate range of motion.  No evidence of any motor or sensory deficit.  No evidence of limb length discrepancy.  Patient has good distal pulses and no calf tenderness.\par \par On physical examination of both knees.  The skin is clean dry and intact with no areas of redness swelling or heat noted.  There is diffuse pain and tenderness in all 3 compartments.  Adequate range of motion with some crepitus.  No evidence of ligamentous laxity.  No evidence of any muscle atrophy.  No evidence of any motor or sensory deficit.  Patient has good distal pulses with no calf tenderness. [de-identified] : X-rays of the left hip reveal status post left total hip replacement.  The hardware is in place and shows excellent alignment as well as fixation.  There is no evidence of any fracture dislocation or loosening of any hardware.\par \par X-rays of the right hip show some narrowing of the joint spacing.  No evidence of any fracture or dislocation.\par \par X-rays of both knees reveal significant osteoarthritic changes in all 3 compartments.  Primarily the patellofemoral as well as the medial femoral-tibial compartment.  There is flattening of the medial femoral condyles in both knees.  With osteophyte formation.  There is no evidence of any fracture dislocation

## 2023-01-19 NOTE — PATIENT PROFILE ADULT - HISTORY OF COVID-19 VACCINATION
Called and spoke with Shanon, Care Coordinator with Dr. Lziama at [facility].     Shanon spoke with Dr. Lizama who stated, three views of his right ankle and an AP lateral of his right TIB FIB.    Mary Mirza RN on 1/19/2023 at 2:33 PM     Yes

## 2023-06-06 NOTE — ED PROVIDER NOTE - NS ED MD DISPO DIVISION
Attempted to contact patient    Call went straight to voicemail    Message left to call office back and ask for triage nurse    Reason for Disposition  • [1] MODERATE back pain (e.g., interferes with normal activities) AND [2] present > 3 days    Protocols used: BACK PAIN-A-AH     Patient states she has had severe back pain for about a month. It hurts to even sit. She requests a call back.  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 at a bar    She has been trying to do a lot of stretching, using heating pad, taking NSAID's with very little relief    Denies: loss of bowel/ bladder control, no flank pain, numbness or tingling, pain does not radiative to hips, legs, knees, no sciatica, no swelling, redness, bruising, no urinary symptoms, no fevers or chills    PLAN:  -patient scheduled with PCP 4/13/22  -rest in between activities  -ice for first 24-48 hours then may use moist heat and alternate PRN, avoid burning or frostbite  -tylenol PRN  -OTC lidocaine 4% gel, icy/ hot, Voltaren cream  -elevate upper/ lower extremities  -avoid standing or sitting for prolonged period of time   -walk slowly, good walking shoes/ slippers with rubber soles  -pillows between knees, legs, back as needed for comfort  -avoid sleeping on abdomen  -mild massage, stretching  -call office if any changes, questions or concerns  -if severe pain, chest pain, if any difficulties breathing, shortness of breath, confusion, altered mental status, uncontrollable fever, urinary symptoms, flank pain- seek ER  -patient verbalized understanding, agreeable to treatment plan  -message routed to PCP   [FreeTextEntry1] : ANTHONY LIMA had BILATERAL SACROILIAC JOINT INJECTION WITH FLUOROSCOPIC GUIDANCE on 5/23/23. Today patient is here for follow up with his wife. Patient reports greater than 80% reduction in pain as tested by the NRS pain scale. Patient also reports improvement in quality of life. Overall he is happier than when he initially came in for his first visit. He can now sit and lay down for longer period of time. He is here using 2 straight canes to walk around. He is asking about going back to PT. Patient also had hip/thigh pains and is wondering what to do about it.\par \par Denies any new pain, numbness or weakness, bowel/bladder dysfunction, saddle anesthesia, fevers, chills, weight loss, night pain, or night sweats at this time. VAN

## 2023-10-03 ENCOUNTER — APPOINTMENT (OUTPATIENT)
Dept: CT IMAGING | Facility: IMAGING CENTER | Age: 78
End: 2023-10-03
Payer: MEDICARE

## 2023-10-03 ENCOUNTER — OUTPATIENT (OUTPATIENT)
Dept: OUTPATIENT SERVICES | Facility: HOSPITAL | Age: 78
LOS: 1 days | End: 2023-10-03
Payer: MEDICARE

## 2023-10-03 DIAGNOSIS — Z98.890 OTHER SPECIFIED POSTPROCEDURAL STATES: Chronic | ICD-10-CM

## 2023-10-03 DIAGNOSIS — C34.90 MALIGNANT NEOPLASM OF UNSPECIFIED PART OF UNSPECIFIED BRONCHUS OR LUNG: ICD-10-CM

## 2023-10-03 PROCEDURE — 71250 CT THORAX DX C-: CPT | Mod: 26,MH

## 2023-10-03 PROCEDURE — 71250 CT THORAX DX C-: CPT | Mod: MH

## 2023-11-17 NOTE — BH CONSULTATION LIAISON ASSESSMENT NOTE - NS ED BHA REVIEW OF ED CHART AVAILABLE INVESTIGATIONS REVIEWED
UV Treatment Record      Patient Information  Phototherapy orders per Dr. Quinones  Diagnosis: psoriasis  Treatment:UVB  Skin Type: II  Treatment order 1: full body    Treatment Information Area 1   Treatment #: [id number]  Date: 2018  Chrissie-joules: 645  Mins: 2:27  CUM: 14,210  Special Shields: face shield, goggles, theraplex and zinc oxide    Reaction to Treatment Area 1  Red: 0 - None  Tender/Itchin - None    No new medications.  Dr Fulton-Toure covering on site for lights.   
Anesthesia
None available

## 2024-07-01 ENCOUNTER — EMERGENCY (EMERGENCY)
Facility: HOSPITAL | Age: 79
LOS: 1 days | Discharge: HOME | End: 2024-07-01
Attending: EMERGENCY MEDICINE
Payer: MEDICARE

## 2024-07-01 VITALS
RESPIRATION RATE: 16 BRPM | TEMPERATURE: 98 F | OXYGEN SATURATION: 98 % | SYSTOLIC BLOOD PRESSURE: 165 MMHG | WEIGHT: 149.91 LBS | HEIGHT: 66 IN | DIASTOLIC BLOOD PRESSURE: 90 MMHG | HEART RATE: 80 BPM

## 2024-07-01 DIAGNOSIS — Z98.890 OTHER SPECIFIED POSTPROCEDURAL STATES: Chronic | ICD-10-CM

## 2024-07-01 DIAGNOSIS — F19.94 OTHER PSYCHOACTIVE SUBSTANCE USE, UNSPECIFIED WITH PSYCHOACTIVE SUBSTANCE-INDUCED MOOD DISORDER: ICD-10-CM

## 2024-07-01 DIAGNOSIS — F10.920 ALCOHOL USE, UNSPECIFIED WITH INTOXICATION, UNCOMPLICATED: ICD-10-CM

## 2024-07-01 LAB
ALBUMIN SERPL ELPH-MCNC: 4.8 G/DL — SIGNIFICANT CHANGE UP (ref 3.3–5)
ALP SERPL-CCNC: 68 U/L — SIGNIFICANT CHANGE UP (ref 40–120)
ALT FLD-CCNC: 28 U/L — SIGNIFICANT CHANGE UP (ref 10–45)
ANION GAP SERPL CALC-SCNC: 17 MMOL/L — SIGNIFICANT CHANGE UP (ref 5–17)
APAP SERPL-MCNC: <15 UG/ML — SIGNIFICANT CHANGE UP (ref 10–30)
APPEARANCE UR: CLEAR — SIGNIFICANT CHANGE UP
AST SERPL-CCNC: 49 U/L — HIGH (ref 10–40)
BACTERIA # UR AUTO: NEGATIVE /HPF — SIGNIFICANT CHANGE UP
BASOPHILS # BLD AUTO: 0.03 K/UL — SIGNIFICANT CHANGE UP (ref 0–0.2)
BASOPHILS NFR BLD AUTO: 0.6 % — SIGNIFICANT CHANGE UP (ref 0–2)
BILIRUB SERPL-MCNC: 0.4 MG/DL — SIGNIFICANT CHANGE UP (ref 0.2–1.2)
BILIRUB UR-MCNC: NEGATIVE — SIGNIFICANT CHANGE UP
BUN SERPL-MCNC: 10 MG/DL — SIGNIFICANT CHANGE UP (ref 7–23)
CALCIUM SERPL-MCNC: 10.3 MG/DL — SIGNIFICANT CHANGE UP (ref 8.4–10.5)
CAST: 0 /LPF — SIGNIFICANT CHANGE UP (ref 0–4)
CHLORIDE SERPL-SCNC: 104 MMOL/L — SIGNIFICANT CHANGE UP (ref 96–108)
CO2 SERPL-SCNC: 24 MMOL/L — SIGNIFICANT CHANGE UP (ref 22–31)
COLOR SPEC: YELLOW — SIGNIFICANT CHANGE UP
CREAT SERPL-MCNC: 0.52 MG/DL — SIGNIFICANT CHANGE UP (ref 0.5–1.3)
DIFF PNL FLD: ABNORMAL
EGFR: 96 ML/MIN/1.73M2 — SIGNIFICANT CHANGE UP
EOSINOPHIL # BLD AUTO: 0.05 K/UL — SIGNIFICANT CHANGE UP (ref 0–0.5)
EOSINOPHIL NFR BLD AUTO: 1 % — SIGNIFICANT CHANGE UP (ref 0–6)
ETHANOL SERPL-MCNC: 139 MG/DL — HIGH (ref 0–10)
GLUCOSE SERPL-MCNC: 89 MG/DL — SIGNIFICANT CHANGE UP (ref 70–99)
GLUCOSE UR QL: NEGATIVE MG/DL — SIGNIFICANT CHANGE UP
HCT VFR BLD CALC: 39.4 % — SIGNIFICANT CHANGE UP (ref 34.5–45)
HGB BLD-MCNC: 13.2 G/DL — SIGNIFICANT CHANGE UP (ref 11.5–15.5)
IMM GRANULOCYTES NFR BLD AUTO: 0.2 % — SIGNIFICANT CHANGE UP (ref 0–0.9)
KETONES UR-MCNC: NEGATIVE MG/DL — SIGNIFICANT CHANGE UP
LEUKOCYTE ESTERASE UR-ACNC: ABNORMAL
LYMPHOCYTES # BLD AUTO: 1.83 K/UL — SIGNIFICANT CHANGE UP (ref 1–3.3)
LYMPHOCYTES # BLD AUTO: 36.2 % — SIGNIFICANT CHANGE UP (ref 13–44)
MCHC RBC-ENTMCNC: 33.5 GM/DL — SIGNIFICANT CHANGE UP (ref 32–36)
MCHC RBC-ENTMCNC: 33.9 PG — SIGNIFICANT CHANGE UP (ref 27–34)
MCV RBC AUTO: 101.3 FL — HIGH (ref 80–100)
MONOCYTES # BLD AUTO: 0.51 K/UL — SIGNIFICANT CHANGE UP (ref 0–0.9)
MONOCYTES NFR BLD AUTO: 10.1 % — SIGNIFICANT CHANGE UP (ref 2–14)
NEUTROPHILS # BLD AUTO: 2.62 K/UL — SIGNIFICANT CHANGE UP (ref 1.8–7.4)
NEUTROPHILS NFR BLD AUTO: 51.9 % — SIGNIFICANT CHANGE UP (ref 43–77)
NITRITE UR-MCNC: NEGATIVE — SIGNIFICANT CHANGE UP
NRBC # BLD: 0 /100 WBCS — SIGNIFICANT CHANGE UP (ref 0–0)
PH UR: 6 — SIGNIFICANT CHANGE UP (ref 5–8)
PLATELET # BLD AUTO: 161 K/UL — SIGNIFICANT CHANGE UP (ref 150–400)
POTASSIUM SERPL-MCNC: 3.9 MMOL/L — SIGNIFICANT CHANGE UP (ref 3.5–5.3)
POTASSIUM SERPL-SCNC: 3.9 MMOL/L — SIGNIFICANT CHANGE UP (ref 3.5–5.3)
PROT SERPL-MCNC: 8 G/DL — SIGNIFICANT CHANGE UP (ref 6–8.3)
PROT UR-MCNC: NEGATIVE MG/DL — SIGNIFICANT CHANGE UP
RBC # BLD: 3.89 M/UL — SIGNIFICANT CHANGE UP (ref 3.8–5.2)
RBC # FLD: 13.3 % — SIGNIFICANT CHANGE UP (ref 10.3–14.5)
RBC CASTS # UR COMP ASSIST: 0 /HPF — SIGNIFICANT CHANGE UP (ref 0–4)
REVIEW: SIGNIFICANT CHANGE UP
SALICYLATES SERPL-MCNC: <2 MG/DL — LOW (ref 15–30)
SODIUM SERPL-SCNC: 145 MMOL/L — SIGNIFICANT CHANGE UP (ref 135–145)
SP GR SPEC: <1.005 — LOW (ref 1–1.03)
SQUAMOUS # UR AUTO: 1 /HPF — SIGNIFICANT CHANGE UP (ref 0–5)
UROBILINOGEN FLD QL: 0.2 MG/DL — SIGNIFICANT CHANGE UP (ref 0.2–1)
WBC # BLD: 5.05 K/UL — SIGNIFICANT CHANGE UP (ref 3.8–10.5)
WBC # FLD AUTO: 5.05 K/UL — SIGNIFICANT CHANGE UP (ref 3.8–10.5)
WBC UR QL: 3 /HPF — SIGNIFICANT CHANGE UP (ref 0–5)

## 2024-07-01 PROCEDURE — 90792 PSYCH DIAG EVAL W/MED SRVCS: CPT | Mod: 93

## 2024-07-01 PROCEDURE — 80307 DRUG TEST PRSMV CHEM ANLYZR: CPT

## 2024-07-01 PROCEDURE — 80053 COMPREHEN METABOLIC PANEL: CPT

## 2024-07-01 PROCEDURE — 81001 URINALYSIS AUTO W/SCOPE: CPT

## 2024-07-01 PROCEDURE — 93010 ELECTROCARDIOGRAM REPORT: CPT

## 2024-07-01 PROCEDURE — 99285 EMERGENCY DEPT VISIT HI MDM: CPT | Mod: GC

## 2024-07-01 PROCEDURE — 93005 ELECTROCARDIOGRAM TRACING: CPT

## 2024-07-01 PROCEDURE — 85025 COMPLETE CBC W/AUTO DIFF WBC: CPT

## 2024-07-01 PROCEDURE — 99285 EMERGENCY DEPT VISIT HI MDM: CPT | Mod: 25

## 2024-07-01 RX ORDER — ACETAMINOPHEN 325 MG
650 TABLET ORAL ONCE
Refills: 0 | Status: COMPLETED | OUTPATIENT
Start: 2024-07-01 | End: 2024-07-01

## 2024-07-01 RX ORDER — CHLORDIAZEPOXIDE HYDROCHLORIDE 10 MG/1
50 CAPSULE ORAL ONCE
Refills: 0 | Status: DISCONTINUED | OUTPATIENT
Start: 2024-07-01 | End: 2024-07-01

## 2024-07-01 RX ADMIN — Medication 650 MILLIGRAM(S): at 21:20

## 2024-07-01 RX ADMIN — CHLORDIAZEPOXIDE HYDROCHLORIDE 50 MILLIGRAM(S): 10 CAPSULE ORAL at 21:43

## 2024-07-02 VITALS
RESPIRATION RATE: 18 BRPM | OXYGEN SATURATION: 99 % | DIASTOLIC BLOOD PRESSURE: 75 MMHG | TEMPERATURE: 98 F | SYSTOLIC BLOOD PRESSURE: 116 MMHG | HEART RATE: 73 BPM

## 2024-07-09 LAB — DRUG SCREEN, SERUM: SIGNIFICANT CHANGE UP

## 2024-08-13 ENCOUNTER — APPOINTMENT (OUTPATIENT)
Dept: MRI IMAGING | Facility: IMAGING CENTER | Age: 79
End: 2024-08-13

## 2025-03-03 ENCOUNTER — INPATIENT (INPATIENT)
Facility: HOSPITAL | Age: 80
LOS: 0 days | Discharge: LEFT BEFORE DISCHARGE | End: 2025-03-04
Attending: HOSPITALIST | Admitting: HOSPITALIST
Payer: MEDICARE

## 2025-03-03 VITALS
TEMPERATURE: 98 F | RESPIRATION RATE: 18 BRPM | HEIGHT: 64 IN | SYSTOLIC BLOOD PRESSURE: 138 MMHG | OXYGEN SATURATION: 94 % | WEIGHT: 164.02 LBS | HEART RATE: 98 BPM | DIASTOLIC BLOOD PRESSURE: 86 MMHG

## 2025-03-03 DIAGNOSIS — Z98.890 OTHER SPECIFIED POSTPROCEDURAL STATES: Chronic | ICD-10-CM

## 2025-03-03 DIAGNOSIS — R29.6 REPEATED FALLS: ICD-10-CM

## 2025-03-03 LAB
ALBUMIN SERPL ELPH-MCNC: 4.2 G/DL — SIGNIFICANT CHANGE UP (ref 3.3–5)
ALP SERPL-CCNC: 53 U/L — SIGNIFICANT CHANGE UP (ref 40–120)
ALT FLD-CCNC: 22 U/L — SIGNIFICANT CHANGE UP (ref 4–33)
ANION GAP SERPL CALC-SCNC: 11 MMOL/L — SIGNIFICANT CHANGE UP (ref 7–14)
AST SERPL-CCNC: 45 U/L — HIGH (ref 4–32)
BASOPHILS # BLD AUTO: 0.01 K/UL — SIGNIFICANT CHANGE UP (ref 0–0.2)
BASOPHILS NFR BLD AUTO: 0.2 % — SIGNIFICANT CHANGE UP (ref 0–2)
BILIRUB SERPL-MCNC: 0.6 MG/DL — SIGNIFICANT CHANGE UP (ref 0.2–1.2)
BUN SERPL-MCNC: 10 MG/DL — SIGNIFICANT CHANGE UP (ref 7–23)
CALCIUM SERPL-MCNC: 9.4 MG/DL — SIGNIFICANT CHANGE UP (ref 8.4–10.5)
CHLORIDE SERPL-SCNC: 105 MMOL/L — SIGNIFICANT CHANGE UP (ref 98–107)
CO2 SERPL-SCNC: 27 MMOL/L — SIGNIFICANT CHANGE UP (ref 22–31)
CREAT SERPL-MCNC: 0.45 MG/DL — LOW (ref 0.5–1.3)
EGFR: 98 ML/MIN/1.73M2 — SIGNIFICANT CHANGE UP
EGFR: 98 ML/MIN/1.73M2 — SIGNIFICANT CHANGE UP
EOSINOPHIL # BLD AUTO: 0.04 K/UL — SIGNIFICANT CHANGE UP (ref 0–0.5)
EOSINOPHIL NFR BLD AUTO: 0.7 % — SIGNIFICANT CHANGE UP (ref 0–6)
GLUCOSE SERPL-MCNC: 89 MG/DL — SIGNIFICANT CHANGE UP (ref 70–99)
HCT VFR BLD CALC: 38.3 % — SIGNIFICANT CHANGE UP (ref 34.5–45)
HGB BLD-MCNC: 12.2 G/DL — SIGNIFICANT CHANGE UP (ref 11.5–15.5)
IANC: 2.72 K/UL — SIGNIFICANT CHANGE UP (ref 1.8–7.4)
IMM GRANULOCYTES NFR BLD AUTO: 0.4 % — SIGNIFICANT CHANGE UP (ref 0–0.9)
LYMPHOCYTES # BLD AUTO: 2.08 K/UL — SIGNIFICANT CHANGE UP (ref 1–3.3)
LYMPHOCYTES # BLD AUTO: 37.2 % — SIGNIFICANT CHANGE UP (ref 13–44)
MAGNESIUM SERPL-MCNC: 1.6 MG/DL — SIGNIFICANT CHANGE UP (ref 1.6–2.6)
MCHC RBC-ENTMCNC: 31.9 G/DL — LOW (ref 32–36)
MCHC RBC-ENTMCNC: 33.8 PG — SIGNIFICANT CHANGE UP (ref 27–34)
MCV RBC AUTO: 106.1 FL — HIGH (ref 80–100)
MONOCYTES # BLD AUTO: 0.72 K/UL — SIGNIFICANT CHANGE UP (ref 0–0.9)
MONOCYTES NFR BLD AUTO: 12.9 % — SIGNIFICANT CHANGE UP (ref 2–14)
NEUTROPHILS # BLD AUTO: 2.72 K/UL — SIGNIFICANT CHANGE UP (ref 1.8–7.4)
NEUTROPHILS NFR BLD AUTO: 48.6 % — SIGNIFICANT CHANGE UP (ref 43–77)
NRBC # BLD AUTO: 0 K/UL — SIGNIFICANT CHANGE UP (ref 0–0)
NRBC # FLD: 0 K/UL — SIGNIFICANT CHANGE UP (ref 0–0)
NRBC BLD AUTO-RTO: 0 /100 WBCS — SIGNIFICANT CHANGE UP (ref 0–0)
PLATELET # BLD AUTO: 262 K/UL — SIGNIFICANT CHANGE UP (ref 150–400)
POTASSIUM SERPL-MCNC: 3.9 MMOL/L — SIGNIFICANT CHANGE UP (ref 3.5–5.3)
POTASSIUM SERPL-SCNC: 3.9 MMOL/L — SIGNIFICANT CHANGE UP (ref 3.5–5.3)
PROT SERPL-MCNC: 6.8 G/DL — SIGNIFICANT CHANGE UP (ref 6–8.3)
RBC # BLD: 3.61 M/UL — LOW (ref 3.8–5.2)
RBC # FLD: 12.5 % — SIGNIFICANT CHANGE UP (ref 10.3–14.5)
SODIUM SERPL-SCNC: 143 MMOL/L — SIGNIFICANT CHANGE UP (ref 135–145)
WBC # BLD: 5.59 K/UL — SIGNIFICANT CHANGE UP (ref 3.8–10.5)
WBC # FLD AUTO: 5.59 K/UL — SIGNIFICANT CHANGE UP (ref 3.8–10.5)

## 2025-03-03 PROCEDURE — 99285 EMERGENCY DEPT VISIT HI MDM: CPT

## 2025-03-03 PROCEDURE — 70450 CT HEAD/BRAIN W/O DYE: CPT | Mod: 26

## 2025-03-03 PROCEDURE — 73522 X-RAY EXAM HIPS BI 3-4 VIEWS: CPT | Mod: 26

## 2025-03-03 PROCEDURE — 72192 CT PELVIS W/O DYE: CPT | Mod: 26

## 2025-03-03 PROCEDURE — 71046 X-RAY EXAM CHEST 2 VIEWS: CPT | Mod: 26

## 2025-03-03 PROCEDURE — 72125 CT NECK SPINE W/O DYE: CPT | Mod: 26

## 2025-03-03 RX ORDER — ACETAMINOPHEN 500 MG/5ML
650 LIQUID (ML) ORAL EVERY 6 HOURS
Refills: 0 | Status: DISCONTINUED | OUTPATIENT
Start: 2025-03-03 | End: 2025-03-04

## 2025-03-03 RX ORDER — KETOROLAC TROMETHAMINE 30 MG/ML
30 INJECTION, SOLUTION INTRAMUSCULAR; INTRAVENOUS ONCE
Refills: 0 | Status: DISCONTINUED | OUTPATIENT
Start: 2025-03-03 | End: 2025-03-03

## 2025-03-03 RX ORDER — ACETAMINOPHEN 500 MG/5ML
1000 LIQUID (ML) ORAL ONCE
Refills: 0 | Status: COMPLETED | OUTPATIENT
Start: 2025-03-03 | End: 2025-03-03

## 2025-03-03 RX ORDER — MELATONIN 5 MG
3 TABLET ORAL AT BEDTIME
Refills: 0 | Status: DISCONTINUED | OUTPATIENT
Start: 2025-03-03 | End: 2025-03-04

## 2025-03-03 RX ORDER — SODIUM CHLORIDE 9 G/1000ML
500 INJECTION, SOLUTION INTRAVENOUS ONCE
Refills: 0 | Status: COMPLETED | OUTPATIENT
Start: 2025-03-03 | End: 2025-03-03

## 2025-03-03 RX ORDER — SODIUM CHLORIDE 9 G/1000ML
500 INJECTION, SOLUTION INTRAVENOUS
Refills: 0 | Status: DISCONTINUED | OUTPATIENT
Start: 2025-03-03 | End: 2025-03-04

## 2025-03-03 RX ADMIN — SODIUM CHLORIDE 500 MILLILITER(S): 9 INJECTION, SOLUTION INTRAVENOUS at 22:44

## 2025-03-03 RX ADMIN — Medication 1000 MILLIGRAM(S): at 21:40

## 2025-03-03 RX ADMIN — Medication 400 MILLIGRAM(S): at 15:23

## 2025-03-03 RX ADMIN — KETOROLAC TROMETHAMINE 30 MILLIGRAM(S): 30 INJECTION, SOLUTION INTRAMUSCULAR; INTRAVENOUS at 15:22

## 2025-03-03 RX ADMIN — Medication 400 MILLIGRAM(S): at 21:31

## 2025-03-03 NOTE — ED PROVIDER NOTE - PHYSICAL EXAMINATION
Physical Exam:  Gen:  Well appearing, awake, alert, non-toxic appearing  Head: NCAT  HEENT: Normal conjunctiva, trachea midline, moist mucous membranes  Lung: no respiratory distress,  speaking in full sentences  CV: RRR,  Abd: Soft, non-tender, non-distended,   MSK: No visible deformities, moves all four extremities,   Neuro: No focal motor deficits  Skin: Warm, well perfused, no visible rashes,   Psych: appropriate affect and mood

## 2025-03-03 NOTE — ED PROVIDER NOTE - CARE PLAN
Principal Discharge DX:	Frequent falls   1 Principal Discharge DX:	Frequent falls  Secondary Diagnosis:	Back pain  Secondary Diagnosis:	Leg pain

## 2025-03-03 NOTE — ED ADULT TRIAGE NOTE - CHIEF COMPLAINT QUOTE
c/o pain to neck, legs and knee joints, onset  1 month ago, reports underwent multiple X ray studies and diagnosed with Arthritis at that time, reports has frequent falls due to worsening pain. Phx fo ETOH abuse, last drink on Friday, Afib, HTN

## 2025-03-03 NOTE — ED PROVIDER NOTE - OBJECTIVE STATEMENT
80-year-old female history of depression, lung carcinoma, alcohol use disorder presents to the ed for joint pain x months. Pt states she has had extensive workup with neurologist and spine doctor. she has gotten imaging done that showed arthritis. she has been treated w/ injection for pain control. pt endorsed upper back pain today. she took oxycodone is morning. pt states shes tired of seeing doctors and getting imaging done and still being in pain. Pt was recently in [place] and reports less joint pain while she was there. she does report falling while she was there and having more trouble standing up from seated positing secondary to joint stiffness. states she hit her hip during one of her falls. she is able to ambulate w/ assistance.

## 2025-03-03 NOTE — ED PROVIDER NOTE - CLINICAL SUMMARY MEDICAL DECISION MAKING FREE TEXT BOX
80-year-old female history of depression, lung carcinoma, alcohol use disorder presents to the ed for joint pain x months. Pt states she has had extensive workup with neurologist and spine doctor. she has gotten imaging done that showed arthritis. she has been treated w/ injection for pain control. pt endorsed upper back pain today. she took oxycodone is morning. pt states shes tired of seeing doctors and getting imaging done and still being in pain. Pt was recently in [place] and reports less joint pain while she was there. she does report falling while she was there and having more trouble standing up from seated positing secondary to joint stiffness. states she hit her hip during one of her falls. she is able to ambulate w/ assistance.   will check xray chest, hip and pelvis to eval for acute fracture/dislocations. labs for electrolyte abnormalities and pain control w/ toradol

## 2025-03-03 NOTE — ED PROVIDER NOTE - PROGRESS NOTE DETAILS
Beatrice Palomares MD (PGY2) pt signed out to me. Patient coming in with chronic back pain.  No urinary retention or lower extremity weakness.  X-ray shows evidence of avascular necrosis which is a chronic finding.  Plan as per day team, pending CT bony pelvis to rule out fracture.  Plan is for admission for PT/OT eval.

## 2025-03-03 NOTE — ED ADULT NURSE REASSESSMENT NOTE - NS ED NURSE REASSESS COMMENT FT1
pt provided food per provider dayana. IVF running as ordered. all safety measures maintained throughout care of pt

## 2025-03-03 NOTE — ED ADULT NURSE NOTE - OBJECTIVE STATEMENT
ER pt AOx 3 coming with chronic isabella. hip pain/legs pain and frequent falls due to weakness of legs, pt also have been f/u with neurology walks with can at home,   denies CP. no SOB, no dizziness.   Labs sent, IV to left Ac 20g angio cath place.

## 2025-03-03 NOTE — ED PROVIDER NOTE - NS ED MD DISPO SPECIAL CONSIDERATION1
Pt discharged with hospital escort. Infant placed in carseat by parent and checked by RN. PT discharge instructions provided approximately 1115 no prescriptions ordered by MD. Checked armbands. Clamp and cuddles removed. No further questions at this time.    None

## 2025-03-03 NOTE — ED PROVIDER NOTE - ATTENDING CONTRIBUTION TO CARE
81 yo F with h/o  depression, lung carcinoma, alcohol use disorder who presents to the ED c/o chronic full body pain. She reports pain is worse in her back and bilateral lower extremities. SHe reports seeing numerous physicians for this over the past several years, was told she has some type of arthritis. Takes oxy at home for pain. Has never seen pain management. She reports she is here today to get answers for the cause of her pain. Has had frequent falls recently due to pain in her lower extremities as they give out. No numbness, weakness, tingling, saddle anesthesia, bowel/bladder incontinence. Plan for labs, CT head/C-spine given recent falls.

## 2025-03-03 NOTE — ED ADULT NURSE REASSESSMENT NOTE - NS ED NURSE REASSESS COMMENT FT1
report received from  CASSIE olmos. pt remains at baseline mental status A&XOX3, RR even unlabored completing full clear sentences. pt resting in stretcher comfortably at this time, no new complaints offered. NAD noted. stretcher lowest position siderails up safety measures in place. pt admitted pending bed placement at this time

## 2025-03-04 ENCOUNTER — TRANSCRIPTION ENCOUNTER (OUTPATIENT)
Age: 80
End: 2025-03-04

## 2025-03-04 VITALS
DIASTOLIC BLOOD PRESSURE: 68 MMHG | HEART RATE: 72 BPM | SYSTOLIC BLOOD PRESSURE: 162 MMHG | OXYGEN SATURATION: 99 % | RESPIRATION RATE: 19 BRPM

## 2025-03-04 DIAGNOSIS — R26.81 UNSTEADINESS ON FEET: ICD-10-CM

## 2025-03-04 DIAGNOSIS — Z29.9 ENCOUNTER FOR PROPHYLACTIC MEASURES, UNSPECIFIED: ICD-10-CM

## 2025-03-04 DIAGNOSIS — I48.91 UNSPECIFIED ATRIAL FIBRILLATION: ICD-10-CM

## 2025-03-04 DIAGNOSIS — Z85.118 PERSONAL HISTORY OF OTHER MALIGNANT NEOPLASM OF BRONCHUS AND LUNG: ICD-10-CM

## 2025-03-04 DIAGNOSIS — D53.9 NUTRITIONAL ANEMIA, UNSPECIFIED: ICD-10-CM

## 2025-03-04 DIAGNOSIS — R52 PAIN, UNSPECIFIED: ICD-10-CM

## 2025-03-04 DIAGNOSIS — M87.00 IDIOPATHIC ASEPTIC NECROSIS OF UNSPECIFIED BONE: ICD-10-CM

## 2025-03-04 DIAGNOSIS — M19.90 UNSPECIFIED OSTEOARTHRITIS, UNSPECIFIED SITE: ICD-10-CM

## 2025-03-04 LAB
ALBUMIN SERPL ELPH-MCNC: 3.7 G/DL — SIGNIFICANT CHANGE UP (ref 3.3–5)
ALP SERPL-CCNC: 49 U/L — SIGNIFICANT CHANGE UP (ref 40–120)
ALT FLD-CCNC: 22 U/L — SIGNIFICANT CHANGE UP (ref 4–33)
ANION GAP SERPL CALC-SCNC: 11 MMOL/L — SIGNIFICANT CHANGE UP (ref 7–14)
APPEARANCE UR: CLEAR — SIGNIFICANT CHANGE UP
APTT BLD: 22.5 SEC — LOW (ref 24.5–35.6)
AST SERPL-CCNC: 37 U/L — HIGH (ref 4–32)
BACTERIA # UR AUTO: NEGATIVE /HPF — SIGNIFICANT CHANGE UP
BILIRUB SERPL-MCNC: 0.4 MG/DL — SIGNIFICANT CHANGE UP (ref 0.2–1.2)
BILIRUB UR-MCNC: NEGATIVE — SIGNIFICANT CHANGE UP
BUN SERPL-MCNC: 9 MG/DL — SIGNIFICANT CHANGE UP (ref 7–23)
CALCIUM SERPL-MCNC: 8.7 MG/DL — SIGNIFICANT CHANGE UP (ref 8.4–10.5)
CAST: 0 /LPF — SIGNIFICANT CHANGE UP (ref 0–4)
CCP IGG SERPL-ACNC: <8 U/ML — SIGNIFICANT CHANGE UP
CHLORIDE SERPL-SCNC: 106 MMOL/L — SIGNIFICANT CHANGE UP (ref 98–107)
CO2 SERPL-SCNC: 24 MMOL/L — SIGNIFICANT CHANGE UP (ref 22–31)
COLOR SPEC: YELLOW — SIGNIFICANT CHANGE UP
CREAT SERPL-MCNC: 0.48 MG/DL — LOW (ref 0.5–1.3)
CRP SERPL-MCNC: <3 MG/L — SIGNIFICANT CHANGE UP
DIFF PNL FLD: NEGATIVE — SIGNIFICANT CHANGE UP
EGFR: 97 ML/MIN/1.73M2 — SIGNIFICANT CHANGE UP
EGFR: 97 ML/MIN/1.73M2 — SIGNIFICANT CHANGE UP
ERYTHROCYTE [SEDIMENTATION RATE] IN BLOOD: 7 MM/HR — SIGNIFICANT CHANGE UP (ref 4–25)
FOLATE SERPL-MCNC: 13 NG/ML — SIGNIFICANT CHANGE UP (ref 3.1–17.5)
GLUCOSE SERPL-MCNC: 102 MG/DL — HIGH (ref 70–99)
GLUCOSE UR QL: NEGATIVE MG/DL — SIGNIFICANT CHANGE UP
HCT VFR BLD CALC: 35.5 % — SIGNIFICANT CHANGE UP (ref 34.5–45)
HGB BLD-MCNC: 11.3 G/DL — LOW (ref 11.5–15.5)
INR BLD: 1.07 RATIO — SIGNIFICANT CHANGE UP (ref 0.85–1.16)
KETONES UR-MCNC: ABNORMAL MG/DL
LEUKOCYTE ESTERASE UR-ACNC: NEGATIVE — SIGNIFICANT CHANGE UP
MAGNESIUM SERPL-MCNC: 1.4 MG/DL — LOW (ref 1.6–2.6)
MCHC RBC-ENTMCNC: 31.8 G/DL — LOW (ref 32–36)
MCHC RBC-ENTMCNC: 33.5 PG — SIGNIFICANT CHANGE UP (ref 27–34)
MCV RBC AUTO: 105.3 FL — HIGH (ref 80–100)
NITRITE UR-MCNC: NEGATIVE — SIGNIFICANT CHANGE UP
NRBC # BLD AUTO: 0 K/UL — SIGNIFICANT CHANGE UP (ref 0–0)
NRBC # FLD: 0 K/UL — SIGNIFICANT CHANGE UP (ref 0–0)
NRBC BLD AUTO-RTO: 0 /100 WBCS — SIGNIFICANT CHANGE UP (ref 0–0)
PH UR: 6 — SIGNIFICANT CHANGE UP (ref 5–8)
PHOSPHATE SERPL-MCNC: 2.6 MG/DL — SIGNIFICANT CHANGE UP (ref 2.5–4.5)
PLATELET # BLD AUTO: 237 K/UL — SIGNIFICANT CHANGE UP (ref 150–400)
POTASSIUM SERPL-MCNC: 3.5 MMOL/L — SIGNIFICANT CHANGE UP (ref 3.5–5.3)
POTASSIUM SERPL-SCNC: 3.5 MMOL/L — SIGNIFICANT CHANGE UP (ref 3.5–5.3)
PROT SERPL-MCNC: 6.2 G/DL — SIGNIFICANT CHANGE UP (ref 6–8.3)
PROT UR-MCNC: NEGATIVE MG/DL — SIGNIFICANT CHANGE UP
PROTHROM AB SERPL-ACNC: 12.7 SEC — SIGNIFICANT CHANGE UP (ref 9.9–13.4)
RBC # BLD: 3.37 M/UL — LOW (ref 3.8–5.2)
RBC # FLD: 12.5 % — SIGNIFICANT CHANGE UP (ref 10.3–14.5)
RBC CASTS # UR COMP ASSIST: 2 /HPF — SIGNIFICANT CHANGE UP (ref 0–4)
RF+CCP IGG SER-IMP: NEGATIVE — SIGNIFICANT CHANGE UP
SODIUM SERPL-SCNC: 141 MMOL/L — SIGNIFICANT CHANGE UP (ref 135–145)
SP GR SPEC: 1.02 — SIGNIFICANT CHANGE UP (ref 1–1.03)
SQUAMOUS # UR AUTO: 2 /HPF — SIGNIFICANT CHANGE UP (ref 0–5)
UROBILINOGEN FLD QL: 1 MG/DL — SIGNIFICANT CHANGE UP (ref 0.2–1)
VIT B12 SERPL-MCNC: 287 PG/ML — SIGNIFICANT CHANGE UP (ref 200–900)
WBC # BLD: 4.13 K/UL — SIGNIFICANT CHANGE UP (ref 3.8–10.5)
WBC # FLD AUTO: 4.13 K/UL — SIGNIFICANT CHANGE UP (ref 3.8–10.5)
WBC UR QL: 1 /HPF — SIGNIFICANT CHANGE UP (ref 0–5)

## 2025-03-04 PROCEDURE — 99223 1ST HOSP IP/OBS HIGH 75: CPT | Mod: GC

## 2025-03-04 RX ORDER — MAGNESIUM SULFATE 500 MG/ML
1 SYRINGE (ML) INJECTION ONCE
Refills: 0 | Status: COMPLETED | OUTPATIENT
Start: 2025-03-04 | End: 2025-03-04

## 2025-03-04 RX ORDER — ENOXAPARIN SODIUM 100 MG/ML
40 INJECTION SUBCUTANEOUS EVERY 24 HOURS
Refills: 0 | Status: DISCONTINUED | OUTPATIENT
Start: 2025-03-04 | End: 2025-03-04

## 2025-03-04 RX ORDER — DIGOXIN 250 UG/1
125 TABLET ORAL DAILY
Refills: 0 | Status: DISCONTINUED | OUTPATIENT
Start: 2025-03-04 | End: 2025-03-04

## 2025-03-04 RX ORDER — B1/B2/B3/B5/B6/B12/VIT C/FOLIC 500-0.5 MG
1 TABLET ORAL DAILY
Refills: 0 | Status: DISCONTINUED | OUTPATIENT
Start: 2025-03-04 | End: 2025-03-04

## 2025-03-04 RX ORDER — METOPROLOL SUCCINATE 50 MG/1
100 TABLET, EXTENDED RELEASE ORAL DAILY
Refills: 0 | Status: DISCONTINUED | OUTPATIENT
Start: 2025-03-04 | End: 2025-03-04

## 2025-03-04 RX ORDER — KETOROLAC TROMETHAMINE 30 MG/ML
15 INJECTION, SOLUTION INTRAMUSCULAR; INTRAVENOUS EVERY 6 HOURS
Refills: 0 | Status: DISCONTINUED | OUTPATIENT
Start: 2025-03-04 | End: 2025-03-04

## 2025-03-04 RX ORDER — DIAZEPAM 2 MG/1
1 TABLET ORAL
Refills: 0 | DISCHARGE

## 2025-03-04 RX ORDER — ONDANSETRON HCL/PF 4 MG/2 ML
4 VIAL (ML) INJECTION EVERY 8 HOURS
Refills: 0 | Status: DISCONTINUED | OUTPATIENT
Start: 2025-03-04 | End: 2025-03-04

## 2025-03-04 RX ORDER — PAROXETINE HYDROCHLORIDE 20 MG/1
2 TABLET, FILM COATED ORAL
Refills: 0 | DISCHARGE

## 2025-03-04 RX ORDER — KETOROLAC TROMETHAMINE 30 MG/ML
30 INJECTION, SOLUTION INTRAMUSCULAR; INTRAVENOUS EVERY 6 HOURS
Refills: 0 | Status: DISCONTINUED | OUTPATIENT
Start: 2025-03-04 | End: 2025-03-04

## 2025-03-04 RX ORDER — BISACODYL 5 MG
5 TABLET, DELAYED RELEASE (ENTERIC COATED) ORAL EVERY 12 HOURS
Refills: 0 | Status: DISCONTINUED | OUTPATIENT
Start: 2025-03-04 | End: 2025-03-04

## 2025-03-04 RX ORDER — DIAZEPAM 2 MG/1
5 TABLET ORAL ONCE
Refills: 0 | Status: DISCONTINUED | OUTPATIENT
Start: 2025-03-04 | End: 2025-03-04

## 2025-03-04 RX ORDER — LORAZEPAM 4 MG/ML
1 VIAL (ML) INJECTION
Refills: 0 | Status: DISCONTINUED | OUTPATIENT
Start: 2025-03-04 | End: 2025-03-04

## 2025-03-04 RX ORDER — IBUPROFEN 200 MG
600 TABLET ORAL EVERY 8 HOURS
Refills: 0 | Status: DISCONTINUED | OUTPATIENT
Start: 2025-03-04 | End: 2025-03-04

## 2025-03-04 RX ORDER — RIVAROXABAN 10 MG/1
20 TABLET, FILM COATED ORAL
Refills: 0 | Status: DISCONTINUED | OUTPATIENT
Start: 2025-03-04 | End: 2025-03-04

## 2025-03-04 RX ORDER — CYANOCOBALAMIN 1000 UG/ML
1000 INJECTION INTRAMUSCULAR; SUBCUTANEOUS DAILY
Refills: 0 | Status: DISCONTINUED | OUTPATIENT
Start: 2025-03-04 | End: 2025-03-04

## 2025-03-04 RX ORDER — MAGNESIUM SULFATE 500 MG/ML
2 SYRINGE (ML) INJECTION ONCE
Refills: 0 | Status: COMPLETED | OUTPATIENT
Start: 2025-03-04 | End: 2025-03-04

## 2025-03-04 RX ORDER — SENNA 187 MG
2 TABLET ORAL AT BEDTIME
Refills: 0 | Status: DISCONTINUED | OUTPATIENT
Start: 2025-03-04 | End: 2025-03-04

## 2025-03-04 RX ORDER — DIAZEPAM 2 MG/1
5 TABLET ORAL
Refills: 0 | Status: DISCONTINUED | OUTPATIENT
Start: 2025-03-05 | End: 2025-03-04

## 2025-03-04 RX ORDER — LORAZEPAM 4 MG/ML
2 VIAL (ML) INJECTION
Refills: 0 | Status: DISCONTINUED | OUTPATIENT
Start: 2025-03-04 | End: 2025-03-04

## 2025-03-04 RX ORDER — OXYCODONE HYDROCHLORIDE 30 MG/1
2.5 TABLET ORAL THREE TIMES A DAY
Refills: 0 | Status: DISCONTINUED | OUTPATIENT
Start: 2025-03-04 | End: 2025-03-04

## 2025-03-04 RX ORDER — FOLIC ACID 1 MG/1
1 TABLET ORAL DAILY
Refills: 0 | Status: DISCONTINUED | OUTPATIENT
Start: 2025-03-04 | End: 2025-03-04

## 2025-03-04 RX ORDER — PAROXETINE HYDROCHLORIDE 20 MG/1
40 TABLET, FILM COATED ORAL DAILY
Refills: 0 | Status: DISCONTINUED | OUTPATIENT
Start: 2025-03-04 | End: 2025-03-04

## 2025-03-04 RX ADMIN — RIVAROXABAN 20 MILLIGRAM(S): 10 TABLET, FILM COATED ORAL at 17:40

## 2025-03-04 RX ADMIN — Medication 600 MILLIGRAM(S): at 14:19

## 2025-03-04 RX ADMIN — Medication 40 MILLIEQUIVALENT(S): at 09:05

## 2025-03-04 RX ADMIN — Medication 100 GRAM(S): at 09:31

## 2025-03-04 RX ADMIN — DIAZEPAM 5 MILLIGRAM(S): 2 TABLET ORAL at 12:32

## 2025-03-04 RX ADMIN — PAROXETINE HYDROCHLORIDE 40 MILLIGRAM(S): 20 TABLET, FILM COATED ORAL at 17:41

## 2025-03-04 RX ADMIN — Medication 25 GRAM(S): at 11:09

## 2025-03-04 RX ADMIN — Medication 20 MILLIEQUIVALENT(S): at 14:19

## 2025-03-04 RX ADMIN — Medication 40 MILLIGRAM(S): at 06:40

## 2025-03-04 RX ADMIN — FOLIC ACID 1 MILLIGRAM(S): 1 TABLET ORAL at 12:32

## 2025-03-04 RX ADMIN — KETOROLAC TROMETHAMINE 15 MILLIGRAM(S): 30 INJECTION, SOLUTION INTRAMUSCULAR; INTRAVENOUS at 09:04

## 2025-03-04 RX ADMIN — OXYCODONE HYDROCHLORIDE 2.5 MILLIGRAM(S): 30 TABLET ORAL at 17:40

## 2025-03-04 RX ADMIN — CYANOCOBALAMIN 1000 MICROGRAM(S): 1000 INJECTION INTRAMUSCULAR; SUBCUTANEOUS at 12:32

## 2025-03-04 RX ADMIN — Medication 5 MILLIGRAM(S): at 01:56

## 2025-03-04 RX ADMIN — SODIUM CHLORIDE 100 MILLILITER(S): 9 INJECTION, SOLUTION INTRAVENOUS at 00:23

## 2025-03-04 RX ADMIN — DIGOXIN 125 MICROGRAM(S): 250 TABLET ORAL at 06:40

## 2025-03-04 RX ADMIN — METOPROLOL SUCCINATE 100 MILLIGRAM(S): 50 TABLET, EXTENDED RELEASE ORAL at 06:40

## 2025-03-04 NOTE — H&P ADULT - NSHPREVIEWOFSYSTEMS_GEN_ALL_CORE
REVIEW OF SYSTEMS:    CONSTITUTIONAL:  Yes generalized pain, Yes weakness, fevers or chills  EYES/ENT:  No visual changes;  No vertigo or throat pain   NECK:  No pain or stiffness  RESPIRATORY:  No cough, wheezing, hemoptysis; No shortness of breath  CARDIOVASCULAR:  No chest pain or palpitations  GASTROINTESTINAL:  No abdominal or epigastric pain. No nausea, vomiting, or hematemesis; No diarrhea or constipation. No melena or hematochezia.  GENITOURINARY:  No dysuria, frequency or hematuria  MUSCULOSKELETAL:  Weakness in lower extremities, No calf tenderness  NEUROLOGICAL:  Yes ataxic gait, No numbness  SKIN:  No itching, rashes

## 2025-03-04 NOTE — ED ADULT NURSE REASSESSMENT NOTE - NS ED NURSE REASSESS COMMENT FT1
pt remains at baseline mental status, RR even unlabored completing full sentences. pt resting in stretcher comfortably at this time, no new complaints offered. per orders LR IVF stopped after 5hrs at 0500. STAT labs drawn and sent as ordered. stretcher lowest position siderails up safety measures in place. pt admitted pending bed placement at this time

## 2025-03-04 NOTE — H&P ADULT - PROBLEM SELECTOR PLAN 5
Avascular necrosis of left femur, previously seen on MRI in 2022. Patient is unsure exactly when it occurred but knows it was several years ago after a fall. Avascular necrosis of left femur, previously seen on MRI in 2022. Patient is unsure exactly when it occurred but knows it was several years ago after a fall.  Analgesic PRN

## 2025-03-04 NOTE — H&P ADULT - PROBLEM SELECTOR PLAN 6
Patient with history of anxiety and depression.  Reports feeling anxious and depressed every morning due to having to face a day full of pain.  Follows with psychiatry, recently increased paroxetine dosage.    Plan:  - continue with home paroxetine 40mg qd Patient with history of anxiety and depression.  Reports feeling anxious and depressed every morning due to having to face a day full of pain.  Follows with psychiatry, recently increased paroxetine dosage.    Plan:  - continue with home paroxetine 40mg qd  - uses valium 5mg qd for anxiety, hold for now  - continue with ambien for insomnia Patient with history of anxiety and depression.  Reports feeling anxious and depressed every morning due to having to face a day full of pain.  Follows with psychiatry, recently increased paroxetine dosage.    Plan:  - continue with home paroxetine 40mg qd  - uses valium 5mg qd for anxiety, hold for now  - continue with Ambien for insomnia  - f/u TSH and vitamin D levels  - continue with vitamin B12, folate supplements

## 2025-03-04 NOTE — H&P ADULT - NSHPPHYSICALEXAM_GEN_ALL_CORE
PHYSICAL EXAM:    Constitutional:  Eyes:  ENMT:  Neck:  Back:  Respiratory:  Cardiovascular:  Gastrointestinal:  Extremities:  Vascular:  Neurological:  Skin:  Musculoskeletal:  Psychiatric: PHYSICAL EXAM:    Constitutional: NAD, sitting in bed comfortably  Eyes: EOMI, PERRL, non-icteric sclera  Neck: Full ROM, tenderness to palpation on vertebral joints  Back: Tenderness to palpation of vertebrae, no tenderness on paraspinal muscles  Respiratory: CTA b/l, no respiratory distress, no accessory muscle use, no wheezing  Cardiovascular: RRR, S1/S2, no murmur  Gastrointestinal: +BS, NTND  Extremities: no peripheral edema,   Neurological: Full ROM of b/l UE, full ROM of hip and knee joints b/l, limitations to dorsiflexion of b/l feet 2/2 pain and achilles tendon tightness, 5/5 muscle strength b/l UE, 5/5 muscle strength in hip flexion, leg extension, plantarflexion; 4/5 foot dorsiflexion b/l  Skin: bruise and wound on left shin  Musculoskeletal:  Psychiatric: PHYSICAL EXAM:    Constitutional: NAD, sitting in bed comfortably  Eyes: EOMI, PERRL, non-icteric sclera  Neck: Full ROM, tenderness to palpation on vertebral joints  Back: Tenderness to palpation of vertebrae, no tenderness on paraspinal muscles  Respiratory: CTA b/l, no respiratory distress, no accessory muscle use, no wheezing  Cardiovascular: RRR, S1/S2, no murmur  Gastrointestinal: +BS, NTND  Extremities: no peripheral edema,   Neurological: Full ROM of b/l UE, full ROM of hip and knee joints b/l, limitations to dorsiflexion of b/l feet 2/2 pain and achilles tendon tightness, 5/5 muscle strength b/l UE, 5/5 muscle strength in hip flexion, leg extension, plantarflexion; 4/5 foot dorsiflexion b/l  Skin: bruise and wound on left shin  Musculoskeletal: No asymmetry.  Moves all extremities  Psychiatric: appears a bit sad.  Cooperative

## 2025-03-04 NOTE — H&P ADULT - PROBLEM SELECTOR PLAN 4
s/p ablation in 2017    Plan  - continue home digoxin 125mcg qd  - continue home metoprolol 100mg qd afib with rvr s/p ablation in 2017  persistent afib  CHADsVASC score of 4    Plan  - continue home digoxin 125mcg qd  - continue home metoprolol 100mg qd  - f/u EKG A-fib with rvr - s/p ablation in 2017  Persistent A-fib  [id number] score of 4    Plan  - continue home digoxin 125mcg qd  - continue home metoprolol 100mg qd  - f/u EKG  - ensure optimal  hydration  - f/u electrolytes

## 2025-03-04 NOTE — ED ADULT NURSE REASSESSMENT NOTE - NS ED NURSE REASSESS COMMENT FT1
Patient received into my care at 0815hrs, awake and oriented x 3, from [facility] MATTHEWS night shift. On high fowlers position, due meds given.

## 2025-03-04 NOTE — H&P ADULT - PROBLEM SELECTOR PLAN 3
Reports pain in her back and hips limiting mobility.  Patient received injections to both back vertebral joints and trapezius muscles  On physical exam does not appear to have trigger point tenderness, endorses tenderness to palpation of vertebrae more so than paraspinal muscles.  CT spine with osteophytosis.  Patient reports she takes percocet every am for last 1 year.    Plan:  - chronic pain consult  - f/u with outpatient neurologist Reports pain in her back and hips limiting mobility.  Patient received injections to both back vertebral joints and trapezius muscles  On physical exam does not appear to have trigger point tenderness, endorses tenderness to palpation of vertebrae more so than paraspinal muscles.  CT spine with osteophytosis.  Patient reports she takes percocet every am for last 1 year.    Plan:  - chronic pain consult  - f/u with outpatient neurologist  - f/u rheum labs ESR, CRP, CCP  - bowel regiment due to oxy use at home    Pain management  - Acetaminophen mild pain PRN  - 15mg toradol moderate pain PRN  - 30mg toradol severe pain PRN Reports pain in her back and hips limiting mobility.  Patient received injections to both back vertebral joints and trapezius muscles  On physical exam does not appear to have trigger point tenderness, endorses tenderness to palpation of vertebrae more so than paraspinal muscles.  CT spine with osteophytosis.  Patient reports she takes percocet every am for last 1 year.    Plan:  - chronic pain consult  - f/u with outpatient neurologist  - f/u rheum labs ESR, CRP, CCP  - bowel regiment due to oxy use at home    Pain management  - Acetaminophen mild pain PRN  - 15mg Toradol moderate pain PRN  - 30mg Toradol severe pain PRN

## 2025-03-04 NOTE — H&P ADULT - HISTORY OF PRESENT ILLNESS
81 yo female with PMH unstable gait, lung carcinoma, alcohol use disorder, depression, avascular necrosis of the femur, a fib who presents for progressive joint pain over the last few years and multiple falls over the last year. Patient states that over the last few years she has been having progressive pain mostly in her back, neck, and hips. She has been working together with multiple specialties for work up of this pain. She states that she has been seeing a neurologist (Steven Bettencourt) for her pain. She has been getting injections in her back joints and trapezius muscles for this pain, but is unsure what injections she has been getting. She reports the pain is most severe first thing in the morning, and she has had to have a percocet every morning for the last year to help manage this pain. She feels the pain gets worse when the weather is worse. In addition over the last year she has had around 4 falls, with the most recent fall being at the end of January during which she slipped on ice and hit her right hip and left shin. She has persistent bruising from these falls. She describes these falls as mostly slipping on different surfaces. She does not get lightheadedness or dizziness when getting up from sitting. At baseline she has a cane for walking, but has difficulty using the cane so is interested in a rolling walker.     In the ED her CBC was notable for macrocytosis and CMP was unremarkable. She had a CT head, c-spine and pelvis which showed no acute pathology and left femoral head avascular necrosis which has been noted from her MRI in 2022.

## 2025-03-04 NOTE — DISCHARGE NOTE PROVIDER - NSDCCPCAREPLAN_GEN_ALL_CORE_FT
PRINCIPAL DISCHARGE DIAGNOSIS  Diagnosis: Frequent falls  Assessment and Plan of Treatment: You are leaving against medical advise. Please follow up with Primary care doctor as soon as possible or return to ED for any concerns.     PRINCIPAL DISCHARGE DIAGNOSIS  Diagnosis: Frequent falls  Assessment and Plan of Treatment: You are leaving against medical advise. Please follow up with Primary care doctor as soon as possible or return to ED for any concerns. Would discuss with Dr Lutz about slowly cutting back on your percocet, ambien and valium as this combination is not ideal and may be contributing to your falls especially in combination with alcohol intake

## 2025-03-04 NOTE — H&P ADULT - ASSESSMENT
79 yo female with PMH unstable gait, lung carcinoma, alcohol use disorder, depression, avascular necrosis of the femur, a fib who presents for progressive joint pain and multiple falls over the last year. [ x ]  Lab studies personally reviewed  [ x ]  Radiology personally reviewed  [ x ]  Old records personally reviewed    81 yo female with PMH unstable gait, lung carcinoma, alcohol use disorder, depression, avascular necrosis of the femur, a fib who presents for progressive joint pain and multiple falls over the last year.

## 2025-03-04 NOTE — CONSULT NOTE ADULT - SUBJECTIVE AND OBJECTIVE BOX
Chief Complaint:  unrelieved back, neck and hip pain    HPI:  79 yo female with PMH unstable gait, lung carcinoma, alcohol use disorder, depression, avascular necrosis of the femur, a fib who presents for progressive joint pain over the last few years and multiple falls over the last year. Patient states that over the last few years she has been having progressive pain mostly in her back, neck, and hips. She has been working together with multiple specialties for work up of this pain. She states that she has been seeing a neurologist (Steven Bettencourt) for her pain. She has been getting injections in her back joints and trapezius muscles for this pain, but is unsure what injections she has been getting. She reports the pain is most severe first thing in the morning, and she has had to have a percocet every morning for the last year to help manage this pain. She feels the pain gets worse when the weather is worse. In addition over the last year she has had around 4 falls, with the most recent fall being at the end of January during which she slipped on ice and hit her right hip and left shin. She has persistent bruising from these falls. She describes these falls as mostly slipping on different surfaces. She does not get lightheadedness or dizziness when getting up from sitting. At baseline she has a cane for walking, but has difficulty using the cane so is interested in a rolling walker.     In the ED her CBC was notable for macrocytosis and CMP was unremarkable. She had a CT head, c-spine and pelvis which showed no acute pathology and left femoral head avascular necrosis which has been noted from her MRI in 2022. (04 Mar 2025 01:38)    Patient seen at the bedside, complaining of pain in her neck, back and hips.  Patient gets injections for her neck and back.  In the past year, patient has fallen four times, last time was a week ago.  Patient states for the past 4 years she has been getting progressively worse.  She has >10/10, intermittent,  lower back pain with occasional muscle spasms, left hip pain from the left femoral head avascular necrosis, and constant neck pain where she has problems turning her head. Patient takes Percocet 5mg-325mg twice to three times a day.  Due to the falls, they did a CT of the head which showed no intracranial hemorrhage, CT cervical spine showed no fractures, and CT Pelvis bony showed that she needs an MRI to further evaluate the avascular necrosis and small hip effusion. Patient's  is concerned because she only takes baby steps when walking.  Patient states the pain from her hips causes her legs to buckle when she is standing and walking.       PAST MEDICAL & SURGICAL HISTORY:  Anxiety  Arthritis  Atrial fibrillation  Depression  Dyslipidemia  Hypertension  Alcohol abuse  H/O prior ablation treatment  cardiac 11/2017  History of cardioversion  12/2017      FAMILY HISTORY:  Family history of breast cancer      SOCIAL HISTORY:  [x ] Denies Smoking or Drug Use  Patient has history of alcohol use disorder    Allergies  No Known Allergies      PAIN MEDICATIONS:  acetaminophen     Tablet .. 650 milliGRAM(s) Oral every 6 hours PRN  diazepam    Tablet 5 milliGRAM(s) Oral once  ibuprofen  Tablet. 600 milliGRAM(s) Oral every 8 hours  melatonin 3 milliGRAM(s) Oral at bedtime PRN  oxyCODONE    IR 2.5 milliGRAM(s) Oral three times a day PRN  PARoxetine 40 milliGRAM(s) Oral daily  zolpidem 5 milliGRAM(s) Oral at bedtime PRN    Heme:  rivaroxaban 20 milliGRAM(s) Oral with dinner    Cardiovascular:  digoxin     Tablet 125 MICROGram(s) Oral daily  metoprolol succinate  milliGRAM(s) Oral daily    GI:  bisacodyl 5 milliGRAM(s) Oral every 12 hours PRN  pantoprazole    Tablet 40 milliGRAM(s) Oral before breakfast  senna 2 Tablet(s) Oral at bedtime    All Other Medications:  cyanocobalamin 1000 MICROGram(s) Oral daily  folic acid 1 milliGRAM(s) Oral daily  lactated ringers. 500 milliLiter(s) IV Continuous <Continuous>  potassium chloride    Tablet ER 20 milliEquivalent(s) Oral once      Vital Signs Last 24 Hrs  T(C): 36.6 (04 Mar 2025 09:46), Max: 36.7 (03 Mar 2025 12:08)  T(F): 97.9 (04 Mar 2025 09:46), Max: 98.1 (03 Mar 2025 12:08)  HR: 78 (04 Mar 2025 09:46) (72 - 98)  BP: 152/90 (04 Mar 2025 09:46) (114/99 - 159/96)  BP(mean): 132 (04 Mar 2025 09:20) (112 - 132)  RR: 18 (04 Mar 2025 09:46) (16 - 18)  SpO2: 99% (04 Mar 2025 09:46) (94% - 99%)    Parameters below as of 04 Mar 2025 09:46  Patient On (Oxygen Delivery Method): room air        PAIN SCORE:  9/10       SCALE USED: (1-10 VNRS)           LABS:                          11.3   4.13  )-----------( 237      ( 04 Mar 2025 04:37 )             35.5     03-04    141  |  106  |  9   ----------------------------<  102[H]  3.5   |  24  |  0.48[L]    Ca    8.7      04 Mar 2025 04:37  Phos  2.6     03-04  Mg     1.40     03-04    TPro  6.2  /  Alb  3.7  /  TBili  0.4  /  DBili  x   /  AST  37[H]  /  ALT  22  /  AlkPhos  49  03-04    PT/INR - ( 04 Mar 2025 04:37 )   PT: 12.7 sec;   INR: 1.07 ratio         PTT - ( 04 Mar 2025 04:37 )  PTT:22.5 sec  Urinalysis Basic - ( 04 Mar 2025 04:37 )    Color: x / Appearance: x / SG: x / pH: x  Gluc: 102 mg/dL / Ketone: x  / Bili: x / Urobili: x   Blood: x / Protein: x / Nitrite: x   Leuk Esterase: x / RBC: x / WBC x   Sq Epi: x / Non Sq Epi: x / Bacteria: x    [x ]  [place]  Reviewed   Dispensed 02/7/25 Percocet 5mg-325mg, quantity 80, 30 day supply   2/7/25 Ambien 5mg, quantity 30, 30 day supply.  2/7/25 Valium 5mg, quantity 60, 30 day supply    PHYSICAL EXAM:  GENERAL: Alert & Oriented x 3 in NAD, well-groomed, well-developed, able to sit up in bed, weakness bilateral lower extremities     Impression/Plan: Requested by Hospitalist  to help manage pain.   Recommendations:  To reduce falls, may consider reducing or discontinuing opioids, Ambien and Diazepam.    -  Consider changing Acetaminophen order to Acetaminophen 650 mg every 8 hours standing x1 day, then PRN pain.  -  Consider ordering Oxycodone 2.5mg every 6 hours PRN for moderate to severe pain (4-10).  Hold for oversedation. Only one dose of Oxycodone in any 6 hours.  Not to be given within 1 hour of any other immediate acting opioid.     -  Consider adding Lidocaine patch to left hip.  12 hours on and 12 hours off x 5 days.  -  Recommend maintaining continuous pulse oximetry.  -  Recommend Physical Therapy consult for TENS therapy, lower extremity strengthening exercises and DME's for home walker, cane?  -  Recommend Holistic RN for complementary and alternative therapies for pain, using a mid-body-spirit-emotion approach.  -  Recommend consult with Orthopedics inpatient vs. outpatient.   -  Recommend follow up with Chronic Pain doctor when discharged. If patient does not have a Chronic Pain doctor, may acquire one through patient's personal insurance carrier.  Discussed patient with Chronic Pain Attending on call, Dr. Carroll, who agrees with the above recommendations.   No further recommendations at this time, Chronic pain service to sign off. May call Chronic Pain Service if needed.   Thank you.

## 2025-03-04 NOTE — H&P ADULT - NSHPLABSRESULTS_GEN_ALL_CORE
< from: CT Head No Cont (03.03.25 @ 19:17) >  FINDINGS:    VENTRICLES AND SULCI: Normal in size and configuration.  INTRA-AXIAL: No mass effect, acute hemorrhage, or midline shift.   Unchanged linear calcification in the right citlalli compared with   11/18/2019, which is nonspecific but could be vascular or dystrophic.   There are periventricular and subcortical white matter hypodensities,   consistent with microvascular type changes. Small chronic lacunar type   infarct suggested in the right caudate head.  EXTRA-AXIAL: No abnormal fluid collection. Basal cisterns are normal in   appearance.    VISUALIZED SINUSES:  Scattered mucosal thickening and fluid in the   bilateral ethmoid and maxillary sinuses. Small amount of layering fluid   in the right maxillary sinus.  TYMPANOMASTOID CAVITIES:  Clear.  VISUALIZED ORBITS: Normal.  CALVARIUM: Intact.    MISCELLANEOUS: None.      < end of copied text >    < from: CT Cervical Spine No Cont (03.03.25 @ 19:17) >    FINDINGS:    No acute fracture or traumatic malalignment. The lateral masses of C1-C2   are congruent.  Cervical alignment is maintained. Unchanged minimal   retrolisthesis of C3 on C4 and C4 on C5 Vertebral body heights are   maintained. No compression fracture. The posterior elements are intact.   No prevertebral soft tissue swelling.    Multilevel cervical spondylosis with marginal osteophytosis,   intervertebral disc space narrowing, endplate sclerosis/cystic changes,   and uncovertebral and facet joint hypertrophic changes. Schmorl's nodes   are noted involving the superior endplate of C4 and superior endplate of   T1. No CT evidence of high-grade spinal canal or osseous neural foraminal   stenosis. No aggressive osseous lesions.    The paraspinal soft tissues are grossly unremarkable. No lymphadenopathy   or neck mass.  The visualized lung apices are unremarkable.  See separately dictated CT head examination for discussion of the   intracranial structures.    < end of copied text >    < from: CT Pelvis Bony Only No Cont (03.03.25 @ 19:17) >    Findings:    There is slight sclerosis at the left femoral head with the avascular   necrosis much better visualized by MRI. There is no articularsurface   collapse. There is a small left hip joint effusion. There is mild lateral   acetabular spurring at both hips. The hip joint spaces are preserved.    There is minimal pubic symphysis arthrosis. There is degenerative change   at the lower lumbar spine. There is colonic diverticulosis.    Impression: Left femoral head avascular necrosis is much better   visualized by MRI. Small left hip joint effusion. No articular surface   collapse.    --- End of Report ---    < end of copied text >

## 2025-03-04 NOTE — CHART NOTE - NSCHARTNOTEFT_GEN_A_CORE
pt seen and examined by me  H&P from earlier this AM reviewed  await PT eval  pt with ho of EtOH dep, unclear extent; pt reports she only drinks when she goes out with friends and that is not every night but could be 3-4 drinks during that time  pt mildly tremulous; pt states this is bc she has not gotten her valium; states she takes valium every day, sometimes twice a day  pt also chronically on percocet and ambien  some concern this may be contributing to her falls due to polypharmacy    will adjust meds to improved pain management  PMD is prescribing ambien, percocet and valium monthly; ISTOP checked [id number]  can f/u with her neurologist as outpt for EMG  will add symptom triggered CIWA as unclear pt's true  will need narcan on dc given pt co-prescribed 2 sedating medications

## 2025-03-04 NOTE — H&P ADULT - ATTENDING COMMENTS
80-year-old female, with past history as noted above, being evaluated for persistent joint pain.  Has undergone extensive work-up, but no relief .  On opiate.  Has been receiving spinal injection, per report.  L-femoral head avascular necrosis and small L-hip joint effusion present; not significantly changed since 2022, per review.  Likely that this unrelenting pain is contributing toward patient's anxiety and depression.  Would obtain outpatient information re evaluate and management to date.  In the setting of macrocytosis and chart review showing low level  of vitamin B12 (209), despite taking oral supplement, patient may still be deficient if not absorbing.  Low level vit B-12 may still be in deficiency state.  Would give sublingual vit B12 to ensure absorption.  Continue with folic acid supplement.  F/u TSH, ESR, CRP, CCP levels.  In the setting of chronic pain therapy, would benefit from Pain Management consult.  Likely that if pain is adequately managed, patient's anxiety/depression may improve.  Ensure optimal hydration.  Correct any electrolyte imbalance.      All other management as prescribed.

## 2025-03-04 NOTE — H&P ADULT - PROBLEM SELECTOR PLAN 8
DVT prophylaxis:   PT/OT: pending  Diet: regular  Dispo: patient reports not wanting to go to [facility]

## 2025-03-04 NOTE — PATIENT PROFILE ADULT - FALL HARM RISK - HARM RISK INTERVENTIONS
Assistance with ambulation/Assistance OOB with selected safe patient handling equipment/Communicate Risk of Fall with Harm to all staff/Discuss with provider need for PT consult/Monitor for mental status changes/Monitor gait and stability/Provide patient with walking aids - walker, cane, crutches/Reinforce activity limits and safety measures with patient and family/Tailored Fall Risk Interventions/Toileting schedule using arm’s reach rule for commode and bathroom/Use of alarms - bed, chair and/or voice tab/Visual Cue: Yellow wristband and red socks/Bed in lowest position, wheels locked, appropriate side rails in place/Call bell, personal items and telephone in reach/Instruct patient to call for assistance before getting out of bed or chair/Non-slip footwear when patient is out of bed/[place] to call system/Physically safe environment - no spills, clutter or unnecessary equipment/Purposeful Proactive Rounding/Room/bathroom lighting operational, light cord in reach

## 2025-03-04 NOTE — H&P ADULT - PROBLEM SELECTOR PLAN 2
On folate and b12 at home    Plan:  - f/u B12  - f/u folate   On folate and b12 at home, but still having macrocytic anemia  Concern for B12 deficiency, which may contribute to ataxia, depression, insomnia    Plan:  - f/u B12 level  - f/u folate  - sublingual B12   On folate and b12 at home, but still having macrocytic anemia  Remote vitamin B12 level = 209  Concern for B12 deficiency, which may contribute to ataxia, depression, insomnia    Plan:  - f/u B12 level  - f/u folate  - sublingual B12 (for better absorption)

## 2025-03-04 NOTE — DISCHARGE NOTE PROVIDER - CARE PROVIDER_API CALL
Ezio Lutz  Cardiology  [address] [place] [address], [address]  [place] [address]  Phone: [phone]  Fax: [phone]  Follow Up Time:

## 2025-03-04 NOTE — DISCHARGE NOTE PROVIDER - HOSPITAL COURSE
Called by nurse to evaluate patient who wishes to leave the hospital against medical advice. Patient is A&O x3 and has full capacity to make his or her own medical decisions. Patient was with her  and cousin.  Pt was informed of their medical conditions, benefits, and alternatives to treatment as well as the risks of refusing treatment and the seriousness of leaving against medical advice such as the risks of more falls, heart attack, stroke, seizure, and even death were fully explained to the patient.  After expressing full understanding, patient then signs out against medical advice witnessed by the nurse and family  members.  [facility], Dr. Mittal  made aware.   79 yo female with PMH unstable gait, lung carcinoma, alcohol use disorder, depression, avascular necrosis of the femur, a fib who presents for progressive joint pain over the last few years and multiple falls over the last year. Patient states that over the last few years she has been having progressive pain mostly in her back, neck, and hips. She has been working together with multiple specialties for work up of this pain. She states that she has been seeing a neurologist (Steven Bettencourt) for her pain. She has been getting injections in her back joints and trapezius muscles for this pain, but is unsure what injections she has been getting.   Patient was awaiting physical therapy evaluation and further evaluation.   Called by nurse to evaluate patient who wishes to leave the hospital against medical advice. Patient is unhappy about bed situation. Bed board notified and bed was prioritized. Patient still was not happy and did not want to wait any longer. Patient is A&O x3 and has full capacity to make his or her own medical decisions. Patient was with her  and cousin.  Pt was informed of their medical conditions, benefits, and alternatives to treatment as well as the risks of refusing treatment and the seriousness of leaving against medical advice such as the risks of more falls, further injury, heart attack, stroke, seizure, and even death were fully explained to the patient.  After expressing full understanding, patient then signs out against medical advice witnessed by the nurse and family  members.  [facility], Dr. Mittal  made aware.   79 yo female with PMH unstable gait, lung carcinoma, alcohol use disorder, depression, avascular necrosis of the femur, a fib who presents for progressive joint pain over the last few years and multiple falls over the last year. Patient states that over the last few years she has been having progressive pain mostly in her back, neck, and hips. She has been working together with multiple specialties for work up of this pain. She states that she has been seeing a neurologist (Steven Bettencourt) for her pain. She has been getting injections in her back joints and trapezius muscles for this pain, but is unsure what injections she has been getting.   Patient was awaiting physical therapy evaluation and further evaluation.   Called by nurse to evaluate patient who wishes to leave the hospital against medical advice. Patient is unhappy about bed situation. Bed board notified and bed was prioritized. Patient still was not happy and did not want to wait any longer. Patient is A&O x3 and has full capacity to make his or her own medical decisions. Patient was with her  and cousin.  Pt was informed of their medical conditions, benefits, and alternatives to treatment as well as the risks of refusing treatment and the seriousness of leaving against medical advice such as the risks of more falls, further injury, heart attack, stroke, seizure, and even death were fully explained to the patient.  After expressing full understanding, patient then signs out against medical advice witnessed by the nurse and family  members.  [facility], Dr. Mittal  made aware.      Concern for polypharmacy exists as contributing to falls, however pt has been on these meds for many years and would need a prolonged taper off

## 2025-03-04 NOTE — DISCHARGE NOTE PROVIDER - NSDCMRMEDTOKEN_GEN_ALL_CORE_FT
cyanocobalamin 1000 mcg oral tablet: 1 tab(s) orally once a day  digoxin 125 mcg (0.125 mg) oral tablet: 1 tab(s) orally once a day  folic acid 1 mg oral tablet: 1 tab(s) orally once a day  oxycodone-acetaminophen 5 mg-325 mg oral tablet: 1 tab(s) orally every 6 hours, As Needed  pantoprazole 40 mg oral delayed release tablet: 1 tab(s) orally once a day  PARoxetine 20 mg oral tablet: 2 tab(s) orally once a day  Toprol- mg oral tablet, extended release: 1 tab(s) orally once a day  Valium 5 mg oral tablet: 1 tab(s) orally once a day as needed for  anxiety  Xarelto 20 mg oral tablet: 1 tab(s) orally once a day (in the evening)   zolpidem 5 mg oral tablet: 1 tab(s) orally once a day (at bedtime) as needed for  insomnia   cyanocobalamin 1000 mcg oral tablet: 1 tab(s) orally once a day  digoxin 125 mcg (0.125 mg) oral tablet: 1 tab(s) orally once a day  folic acid 1 mg oral tablet: 1 tab(s) orally once a day  naloxone 4 mg/0.1 mL nasal spray: 1 spray(s) intranasally once as needed for  drug overdose May repeat every 2-3 minutes as needed.  oxycodone-acetaminophen 5 mg-325 mg oral tablet: 1 tab(s) orally every 6 hours, As Needed  pantoprazole 40 mg oral delayed release tablet: 1 tab(s) orally once a day  PARoxetine 20 mg oral tablet: 2 tab(s) orally once a day  Toprol- mg oral tablet, extended release: 1 tab(s) orally once a day  Valium 5 mg oral tablet: 1 tab(s) orally once a day as needed for  anxiety  Xarelto 20 mg oral tablet: 1 tab(s) orally once a day (in the evening)   zolpidem 5 mg oral tablet: 1 tab(s) orally once a day (at bedtime) as needed for  insomnia

## 2025-03-04 NOTE — H&P ADULT - PROBLEM SELECTOR PLAN 1
Patient noted to have restricted movement of her foot with dorsiflexion, possibly 2/2 pain or joint restrictions.  States that at home she has a cane but is having more difficulty with walking and interested in rolling walker  Follows with neurologist (Steven Bettencourt), has had NCS/EMG and was scheduled for imaging but had fall at the end of 2024 and was not able to follow up.  History of avascular necrosis of L femoral head after fall, noted on MRI in 2022 and on CT pelvis in ED    Plan:  - PT/OT evaluation  - Reach out to outpatient neurologist in AM  - CT head with no cerebellar pathology Patient noted to have restricted movement of her foot with dorsiflexion, possibly 2/2 pain or joint restrictions.  States that at home she has a cane but is having more difficulty with walking and interested in rolling walker  Follows with neurologist (Steven Bettencourt), has had NCS/EMG and was scheduled for imaging but had fall at the end of 2024 and was not able to follow up.  History of avascular necrosis of L femoral head after fall, noted on MRI in 2022 and on CT pelvis in ED    Plan:  - PT/OT evaluation  - Reach out to outpatient neurologist in AM  - CT head with no cerebellar pathology  - Fall precautions  - Continuing w/ PTA vitamin B12, folate supplements; would give sublingual vit B12 upon discharge - to aid absorption  - f/u TSH level

## 2025-03-04 NOTE — H&P ADULT - PROBLEM SELECTOR PLAN 7
Reports surgery 16 years ago, no adjuvant therapy, has been up to date with surveillance imaging with no issues Reports surgery 16 years ago, no adjuvant therapy, has been up to date with surveillance imaging with no issues  Lobectomy, using VATS 04/06/2018 VATS Lingula sparing left upper lobectomy

## 2025-03-05 LAB
CULTURE RESULTS: NO GROWTH — SIGNIFICANT CHANGE UP
HCYS SERPL-MCNC: 9.6 UMOL/L — SIGNIFICANT CHANGE UP
SPECIMEN SOURCE: SIGNIFICANT CHANGE UP

## 2025-03-05 RX ORDER — NALOXONE HYDROCHLORIDE 0.4 MG/ML
1 INJECTION, SOLUTION INTRAMUSCULAR; INTRAVENOUS; SUBCUTANEOUS
Qty: 1 | Refills: 0
Start: 2025-03-05

## 2025-03-05 NOTE — CHART NOTE - NSCHARTNOTEFT_GEN_A_CORE
Patient presented 3/3 for progressive joint pain over the last few years and multiple falls over the last year.  Patient chronically on Valium, Percocet and Ambien prescribed by PMD.  Patient left AMA overnight.  Prescription for Narcan sent by overnight team given pt co-prescribed 2+ sedating medications.    Call placed to patient this morning ([phone]) to make her aware of Narcan prescription sent to her [facility] ([address] [place] [address]).  Patient verbalized understanding of indication for prescription and location of pharmacy for pick-up.  Patient inquired about cost of prescription, pt directed to discuss cost with [facility].  All questions answered.       Izzy Ramirez NP-BC  Department of Medicine  In House Pager #[phone] POST DISCHARGE FOLLOW UP NOTE    Patient presented 3/3 for progressive joint pain over the last few years and multiple falls over the last year.  Patient chronically on Valium, Percocet and Ambien prescribed by PMD.  Patient left AMA overnight.  Prescription for Narcan sent by overnight team given pt co-prescribed 2+ sedating medications.    Call placed to patient this morning ([phone]) to make her aware of Narcan prescription sent to her [facility] ([address] [place] [address]).  Patient verbalized understanding of indication for prescription and location of pharmacy for pick-up.  Patient inquired about cost of prescription, pt directed to discuss cost with [facility].  All questions answered.       Izzy Ramirez NP-BC  Department of Medicine  In House Pager #[phone]

## 2025-03-24 NOTE — BH CONSULTATION LIAISON ASSESSMENT NOTE - NSPRESENTSXS_PSY_ALL_CORE
Anjali from [facility] called to let Dr. Yin know that the pt. Is being discharged from all  services tomorrow.  
Dr.Kraman ARIAS  
Noted.  
Depressed mood/Anhedonia/Severe anxiety, agitation or panic

## 2025-03-27 ENCOUNTER — APPOINTMENT (OUTPATIENT)
Dept: MRI IMAGING | Facility: IMAGING CENTER | Age: 80
End: 2025-03-27

## 2025-05-01 ENCOUNTER — APPOINTMENT (OUTPATIENT)
Dept: ORTHOPEDIC SURGERY | Facility: CLINIC | Age: 80
End: 2025-05-01
Payer: MEDICARE

## 2025-05-01 VITALS
HEIGHT: 64 IN | OXYGEN SATURATION: 97 % | WEIGHT: 117 LBS | DIASTOLIC BLOOD PRESSURE: 91 MMHG | BODY MASS INDEX: 19.97 KG/M2 | SYSTOLIC BLOOD PRESSURE: 157 MMHG | HEART RATE: 96 BPM

## 2025-05-01 DIAGNOSIS — M51.369: ICD-10-CM

## 2025-05-01 DIAGNOSIS — M51.34 OTHER INTERVERTEBRAL DISC DEGENERATION, THORACIC REGION: ICD-10-CM

## 2025-05-01 DIAGNOSIS — M47.812 SPONDYLOSIS W/OUT MYELOPATHY OR RADICULOPATHY, CERVICAL REGION: ICD-10-CM

## 2025-05-01 PROCEDURE — 72100 X-RAY EXAM L-S SPINE 2/3 VWS: CPT

## 2025-05-01 PROCEDURE — 72070 X-RAY EXAM THORAC SPINE 2VWS: CPT

## 2025-05-01 PROCEDURE — 99214 OFFICE O/P EST MOD 30 MIN: CPT

## 2025-05-01 PROCEDURE — 72040 X-RAY EXAM NECK SPINE 2-3 VW: CPT

## 2025-05-16 ENCOUNTER — OUTPATIENT (OUTPATIENT)
Dept: OUTPATIENT SERVICES | Facility: HOSPITAL | Age: 80
LOS: 1 days | End: 2025-05-16
Payer: MEDICARE

## 2025-05-16 ENCOUNTER — APPOINTMENT (OUTPATIENT)
Dept: CT IMAGING | Facility: CLINIC | Age: 80
End: 2025-05-16

## 2025-05-16 DIAGNOSIS — M48.061 SPINAL STENOSIS, LUMBAR REGION WITHOUT NEUROGENIC CLAUDICATION: ICD-10-CM

## 2025-05-16 DIAGNOSIS — Z98.890 OTHER SPECIFIED POSTPROCEDURAL STATES: Chronic | ICD-10-CM

## 2025-05-16 PROCEDURE — 72131 CT LUMBAR SPINE W/O DYE: CPT | Mod: 26

## 2025-05-16 PROCEDURE — 72131 CT LUMBAR SPINE W/O DYE: CPT

## 2025-05-22 ENCOUNTER — APPOINTMENT (OUTPATIENT)
Dept: ORTHOPEDIC SURGERY | Facility: CLINIC | Age: 80
End: 2025-05-22
Payer: MEDICARE

## 2025-05-22 VITALS — BODY MASS INDEX: 19.97 KG/M2 | WEIGHT: 117 LBS | HEIGHT: 64 IN

## 2025-05-22 DIAGNOSIS — M50.30 OTHER CERVICAL DISC DEGENERATION, UNSPECIFIED CERVICAL REGION: ICD-10-CM

## 2025-05-22 DIAGNOSIS — M51.369: ICD-10-CM

## 2025-05-22 PROCEDURE — 99214 OFFICE O/P EST MOD 30 MIN: CPT
